# Patient Record
Sex: MALE | Race: WHITE | Employment: OTHER | URBAN - METROPOLITAN AREA
[De-identification: names, ages, dates, MRNs, and addresses within clinical notes are randomized per-mention and may not be internally consistent; named-entity substitution may affect disease eponyms.]

---

## 2019-11-19 ENCOUNTER — OP HISTORICAL/CONVERTED ENCOUNTER (OUTPATIENT)
Dept: OTHER | Age: 69
End: 2019-11-19

## 2020-06-12 VITALS
RESPIRATION RATE: 18 BRPM | DIASTOLIC BLOOD PRESSURE: 78 MMHG | BODY MASS INDEX: 31.83 KG/M2 | HEIGHT: 72 IN | TEMPERATURE: 98.6 F | HEART RATE: 73 BPM | SYSTOLIC BLOOD PRESSURE: 130 MMHG | WEIGHT: 235 LBS | OXYGEN SATURATION: 97 %

## 2020-06-12 PROBLEM — Z20.822 EXPOSURE TO 2019 NOVEL CORONAVIRUS: Status: ACTIVE | Noted: 2020-05-21

## 2020-06-12 RX ORDER — LISINOPRIL 40 MG/1
40 TABLET ORAL
COMMUNITY

## 2020-06-12 RX ORDER — ATORVASTATIN CALCIUM 40 MG/1
40 TABLET, FILM COATED ORAL
COMMUNITY

## 2020-06-12 RX ORDER — METFORMIN HYDROCHLORIDE 750 MG/1
750 TABLET, EXTENDED RELEASE ORAL
COMMUNITY

## 2020-06-12 RX ORDER — BLOOD SUGAR DIAGNOSTIC
STRIP MISCELLANEOUS SEE ADMIN INSTRUCTIONS
COMMUNITY

## 2020-06-12 RX ORDER — BLOOD SUGAR DIAGNOSTIC
STRIP MISCELLANEOUS
COMMUNITY

## 2020-06-12 RX ORDER — ASPIRIN 81 MG/1
TABLET ORAL DAILY
COMMUNITY
End: 2020-08-04

## 2020-06-12 RX ORDER — METOPROLOL TARTRATE 25 MG/1
TABLET, FILM COATED ORAL 2 TIMES DAILY
COMMUNITY
End: 2020-07-20

## 2020-06-12 RX ORDER — AMMONIUM LACTATE 12 G/100G
CREAM TOPICAL 2 TIMES DAILY
COMMUNITY
End: 2020-10-22

## 2020-06-12 RX ORDER — AMLODIPINE BESYLATE 10 MG/1
10 TABLET ORAL
COMMUNITY

## 2020-06-12 RX ORDER — GLIPIZIDE 10 MG/1
10 TABLET, FILM COATED, EXTENDED RELEASE ORAL 2 TIMES DAILY
COMMUNITY

## 2020-06-25 VITALS
HEIGHT: 72 IN | WEIGHT: 229.4 LBS | HEART RATE: 94 BPM | TEMPERATURE: 98.3 F | SYSTOLIC BLOOD PRESSURE: 163 MMHG | OXYGEN SATURATION: 94 % | DIASTOLIC BLOOD PRESSURE: 78 MMHG | BODY MASS INDEX: 31.07 KG/M2

## 2020-06-25 PROBLEM — E11.40 DIABETIC NEUROPATHY (HCC): Status: ACTIVE | Noted: 2020-06-25

## 2020-07-20 ENCOUNTER — HOSPITAL ENCOUNTER (OUTPATIENT)
Dept: PREADMISSION TESTING | Age: 70
Discharge: HOME | End: 2020-07-20
Payer: MEDICARE

## 2020-07-20 VITALS
DIASTOLIC BLOOD PRESSURE: 81 MMHG | SYSTOLIC BLOOD PRESSURE: 137 MMHG | WEIGHT: 233.03 LBS | BODY MASS INDEX: 31.56 KG/M2 | HEART RATE: 83 BPM | TEMPERATURE: 98 F | HEIGHT: 72 IN | RESPIRATION RATE: 18 BRPM

## 2020-07-20 LAB
ABO + RH BLD: NORMAL
ANION GAP SERPL CALC-SCNC: 7 MMOL/L (ref 5–15)
APPEARANCE UR: CLEAR
BACTERIA URNS QL MICRO: NEGATIVE /HPF
BILIRUB UR QL: NEGATIVE
BLOOD GROUP ANTIBODIES SERPL: NORMAL
BUN SERPL-MCNC: 26 MG/DL (ref 6–20)
BUN/CREAT SERPL: 25 (ref 12–20)
CALCIUM SERPL-MCNC: 8.7 MG/DL (ref 8.5–10.1)
CHLORIDE SERPL-SCNC: 109 MMOL/L (ref 97–108)
CO2 SERPL-SCNC: 21 MMOL/L (ref 21–32)
COLOR UR: ABNORMAL
CREAT SERPL-MCNC: 1.05 MG/DL (ref 0.7–1.3)
EPITH CASTS URNS QL MICRO: ABNORMAL /LPF
ERYTHROCYTE [DISTWIDTH] IN BLOOD BY AUTOMATED COUNT: 13.2 % (ref 11.5–14.5)
EST. AVERAGE GLUCOSE BLD GHB EST-MCNC: 171 MG/DL
GLUCOSE SERPL-MCNC: 234 MG/DL (ref 65–100)
GLUCOSE UR STRIP.AUTO-MCNC: >1000 MG/DL
HBA1C MFR BLD: 7.6 % (ref 4–5.6)
HCT VFR BLD AUTO: 38.9 % (ref 36.6–50.3)
HGB BLD-MCNC: 12.5 G/DL (ref 12.1–17)
HGB UR QL STRIP: NEGATIVE
HYALINE CASTS URNS QL MICRO: ABNORMAL /LPF (ref 0–5)
INR PPP: 1 (ref 0.9–1.1)
KETONES UR QL STRIP.AUTO: NEGATIVE MG/DL
LEUKOCYTE ESTERASE UR QL STRIP.AUTO: NEGATIVE
MCH RBC QN AUTO: 28.2 PG (ref 26–34)
MCHC RBC AUTO-ENTMCNC: 32.1 G/DL (ref 30–36.5)
MCV RBC AUTO: 87.8 FL (ref 80–99)
NITRITE UR QL STRIP.AUTO: NEGATIVE
NRBC # BLD: 0 K/UL (ref 0–0.01)
NRBC BLD-RTO: 0 PER 100 WBC
PH UR STRIP: 5 [PH] (ref 5–8)
PLATELET # BLD AUTO: 184 K/UL (ref 150–400)
PMV BLD AUTO: 9.9 FL (ref 8.9–12.9)
POTASSIUM SERPL-SCNC: 5.1 MMOL/L (ref 3.5–5.1)
PROT UR STRIP-MCNC: NEGATIVE MG/DL
PROTHROMBIN TIME: 10.1 SEC (ref 9–11.1)
RBC # BLD AUTO: 4.43 M/UL (ref 4.1–5.7)
RBC #/AREA URNS HPF: ABNORMAL /HPF (ref 0–5)
SODIUM SERPL-SCNC: 137 MMOL/L (ref 136–145)
SP GR UR REFRACTOMETRY: 1.02 (ref 1–1.03)
SPECIMEN EXP DATE BLD: NORMAL
UA: UC IF INDICATED,UAUC: ABNORMAL
UROBILINOGEN UR QL STRIP.AUTO: 1 EU/DL (ref 0.2–1)
WBC # BLD AUTO: 6 K/UL (ref 4.1–11.1)
WBC URNS QL MICRO: ABNORMAL /HPF (ref 0–4)

## 2020-07-20 PROCEDURE — 36415 COLL VENOUS BLD VENIPUNCTURE: CPT

## 2020-07-20 PROCEDURE — 80048 BASIC METABOLIC PNL TOTAL CA: CPT

## 2020-07-20 PROCEDURE — 85027 COMPLETE CBC AUTOMATED: CPT

## 2020-07-20 PROCEDURE — 85610 PROTHROMBIN TIME: CPT

## 2020-07-20 PROCEDURE — 86900 BLOOD TYPING SEROLOGIC ABO: CPT

## 2020-07-20 PROCEDURE — 81001 URINALYSIS AUTO W/SCOPE: CPT

## 2020-07-20 PROCEDURE — 83036 HEMOGLOBIN GLYCOSYLATED A1C: CPT

## 2020-07-20 RX ORDER — CARVEDILOL 12.5 MG/1
12.5 TABLET ORAL 2 TIMES DAILY WITH MEALS
COMMUNITY

## 2020-07-20 NOTE — PERIOP NOTES
Preoperative and medications instructions reviewed with patient. Patient given  2 bottles of CHG soap. Instructions reviewed on use of CHG soap. Patient given SSI infection FAQS sheet, as well as a  MRSA/MSSA treatment instruction sheet  With an explanation to patient that they will be notified if treatment instructions need to be initiated. Patient was given the opportunity to ask questions on the information provided. Information given regarding covid testing and arrival to hospital on day of surgery. CALLED DR. DUMONT [facility] ([address]) OFFICE REQUESTING RECENT CARDIAC NOTES AND EKG AND INQUIRING WHEN BRILINTA NEEDS TO BE STOPPED PRIOR TO SURGERY. SPOKE WITH Cabrera Lowe . SHE STATED WE NEED A FAX REQUEST IN REFERENCE TO [facility] [address]. CALLED DR. ESPINOSA OFFICE SPOKE WITH GIANNI- SURGICAL SCHEDULER INFORMING OF THE CALL TO CARDIO OFFICE REQUESTING WHEN BRILINTA NEED TO BE STOPPED \" STATES I WILL TALK TO DR. ESPINOSA, PHYSICIAN ASSISTANT OR CALL CARDIOLOGY OFFICE MYSELF AND I WILL CALL THE PATIENT AND LET HIM KNOW IN REGARDS TO [facility]

## 2020-07-21 ENCOUNTER — OFFICE VISIT (OUTPATIENT)
Dept: PODIATRY | Age: 70
End: 2020-07-21
Payer: MEDICARE

## 2020-07-21 VITALS
DIASTOLIC BLOOD PRESSURE: 73 MMHG | BODY MASS INDEX: 31.75 KG/M2 | HEIGHT: 72 IN | HEART RATE: 75 BPM | TEMPERATURE: 98.2 F | OXYGEN SATURATION: 95 % | WEIGHT: 234.4 LBS | SYSTOLIC BLOOD PRESSURE: 153 MMHG

## 2020-07-21 DIAGNOSIS — B35.1 ONYCHOMYCOSIS: ICD-10-CM

## 2020-07-21 LAB
BACTERIA SPEC CULT: NORMAL
BACTERIA SPEC CULT: NORMAL
SERVICE CMNT-IMP: NORMAL

## 2020-07-21 PROCEDURE — 11721 DEBRIDE NAIL 6 OR MORE: CPT | Performed by: PODIATRIST

## 2020-07-21 NOTE — PROGRESS NOTES
Verner Pott is a 78 y.o. male who presents for follow-up of Type 2 diabetes mellitus foot care/exam.  Current symptoms/problems include paresthesias of the feet: tolerable and have been unchanged. Symptoms have been present for several years. Pt states he has close f/u with PCP and states he checks his blood sugar regularly. He states his last hemoglobin A1c was 8.0%. He states he is pending total knee replacement to the left lower extremity. He states he has had slight changes in his medications from his cardiologist.  Denies any other changes in his medications. He denies any recent trauma. He denies any local/systemic signs of infection. He denies any other LE complaints. Known diabetic complications: peripheral neuropathy and cardiovascular disease  Cardiovascular risk factors: smoking/ tobacco exposure, dyslipidemia, diabetes mellitus, obesity, sedentary life style, male gender, hypertension  Current diabetic medications include oral agents (triple therapy)  Eye exam current (within one year): unknown  Current exercise: no regular exercise    Home blood sugar records: trend: fluctuating a bit    Review of Systems   Constitutional: Negative. HENT: Negative. Eyes: Negative. Respiratory: Negative. Cardiovascular: Negative. Gastrointestinal: Negative. Endocrine: Negative. Genitourinary: Negative. Musculoskeletal: Positive for arthralgias, gait problem and joint swelling. Skin: Negative. Allergic/Immunologic: Positive for immunocompromised state. Neurological: Positive for numbness. Hematological: Negative. Psychiatric/Behavioral: Negative. All other systems reviewed and are negative. OBJECTIVE  Physical Exam  Constitutional:       Appearance: Normal appearance. Cardiovascular:      Pulses:           Dorsalis pedis pulses are 2+ on the right side and 2+ on the left side.         Posterior tibial pulses are 2+ on the right side and 2+ on the left side.   Musculoskeletal:      Right ankle: He exhibits decreased range of motion. Achilles tendon exhibits no pain and no defect. Left ankle: He exhibits decreased range of motion. Achilles tendon exhibits no pain and no defect. Right lower leg: No edema. Left lower leg: No edema. Right foot: Normal range of motion. No deformity or foot drop. Left foot: Normal range of motion. No deformity or foot drop. Feet:      Right foot:      Protective Sensation: 0 sites tested. 0 sites sensed. Skin integrity: Skin integrity normal.      Toenail Condition: Right toenails are abnormally thick. Fungal disease present. Left foot:      Protective Sensation: 0 sites tested. 0 sites sensed. Skin integrity: Skin integrity normal.      Toenail Condition: Left toenails are abnormally thick. Fungal disease present. Skin:     General: Skin is warm. Capillary Refill: Capillary refill takes 2 to 3 seconds. Findings: No abrasion, signs of injury or wound. Nails: There is no clubbing. Neurological:      Mental Status: He is alert and oriented to person, place, and time. Mental status is at baseline. Sensory: Sensory deficit present. Motor: No weakness or atrophy. Gait: Gait is intact. Psychiatric:         Mood and Affect: Mood and affect normal.         Behavior: Behavior is cooperative. Cognition and Memory: Cognition normal.         Judgment: Judgment normal.         ASSESSMENT & PLAN  - Diabetes Mellitus type II, under satisfactory control  - Onychomycosis, toenails 1-5 b/l  - Diabetic peripheral neuropathy    1. Rx changes: none  2. Education: Discussed and educated patient regarding their current medical condition. Instructed patient to monitor their feet daily, be compliant with all medications and wear supportive shoe gear. 3. Sharp, aseptic onychoreduction performed to all 10 pedal digits with a nail nipper without incident.  Pt tolerated well with no dressing needed. 4.  Compliance at present is estimated to be satisfactory. Efforts to improve compliance (if necessary) will be directed at regular blood sugar monitoring: 3 times daily.   4. Follow up: 3 months

## 2020-07-21 NOTE — PERIOP NOTES
[facility] FOR A1C OF 7.6 PER ORTHOPEDIC PROTOCOL. SPOKE WITH ELANA IN DR ESPINOSA'S OFFICE RE: SAME AND ROUTED LAB TO PATIENT'S PCP, DR Bishop Espinosa.

## 2020-07-21 NOTE — PERIOP NOTES
PAT LABS FROM 7/20/2020 ROUTED TO PCP. A1C 7.5 AND PER ORTHOPEDIC PROTOCOL, PATIENT REFERRAL MADE [facility] FOR FOLLOW-UP. DR ESPINOSA'S OFFICE NOTIFIED OF THE SAME.

## 2020-07-27 ENCOUNTER — CLINICAL SUPPORT (OUTPATIENT)
Dept: DIABETES SERVICES | Age: 70
End: 2020-07-27

## 2020-07-27 DIAGNOSIS — E11.49 OTHER DIABETIC NEUROLOGICAL COMPLICATION ASSOCIATED WITH TYPE 2 DIABETES MELLITUS (HCC): ICD-10-CM

## 2020-07-27 NOTE — PROGRESS NOTES
[address] [place] [address] Diabetes Health  Diabetes Self-Management Education   Pre-program Assessment and Education    Reason for Referral: Pre-op Ortho Protocol. Referral Source: Eliel Jameson MD    Met with patient in-person in our offices today. Per patient, he has received education related to his diabetes w/in the past 2-3 years. Metric Patient responses (7/27/2020)   A1c  (Goal: 7%)   Recent value:   Lab Results   Component Value Date/Time    Hemoglobin A1c 7.6 (H) 07/20/2020 12:09 PM        Healthy Eating     24-hour Dietary Recall:  Breakfast: dali Kofi sausage biscuit; cereal and milk or oatmeal; sausage gravy and biscuit. Lunch: tomato or turkey sandwich on ww bread, salad (lettuce, onion, cuce, tomato, turkey or ham, balbuena bits, ranch or Kinyarwanda dressing. Dinner: salad; tuna salad with Skeet Bucy,   Snacks: ritz and cheese or banana   Beverages: zero sodas, unsweet tea, started increasing water intake. Alcohol: 4 beer, natural light 4-5 days/week. Instruction was provided on how to meet meal planning guidelines to control BG and promote wound healing. We discussed best options for frozen entrees and provided specific guidelines for sodium and steered him towards brands with low fat guidelines w/ hx of heart disease. Shared how to build meal towards 45-60 g carbohydrate. Discussed options for meal replacements should his appetite decrease post-op. Encouraged to decrease intake of beer. Reviewed plate method with patient (what he is using currently with clarifications). We also reviewed timing of meals to help reduce s/s of hypoglycemia around 4-5 pm happening occasionally per pt.      Stage of change: Action     Being Active     Physical Activity:  How many days during the past week have you performed physical activity where your heart beats faster and your breathing is harder than normal for 30 minutes or more?  0 days    How many days in a typical week do you perform activity such as this?  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 He is looking forward to adding walking into his daily routine once he heals and has his second knee replacement. Stage of change: Contemplation     Monitoring Do you monitor your blood sugar? Yes    How often do you monitor? 2x/day    What are the range of readings? Breakfast:  mg/dL   2 hours post Dinner: 130-170 mg/dL    Do you know your last A1c measurement? Yes    Do you know the meaning of the A1c? No - Instructed patient on what meaning is, target and factors influencing values. Stage of change: Action     Taking Medications Medication Management:  Do you understand what your diabetes medications do? No    Can you afford your diabetes medications? Yes    How often do you miss doses of your diabetes medications? Never    We reviewed the timing of his medications and how to plan around his meals. He rarely misses medications. He does agree to contact his PCP if he experiences hypoglycemia for adjustments to his medications. Current dosing:   Key Antihyperglycemic Medications             insulin detemir U-100 (Levemir U-100 Insulin) 100 unit/mL injection 61 Units by SubCUTAneous route nightly. glipiZIDE SR (GLUCOTROL XL) 10 mg CR tablet Take 5 mg by mouth daily. metFORMIN ER (GLUCOPHAGE XR) 750 mg tablet Take 750 mg by mouth two (2) times a day. Blood Pressure Management:  Key ACE/ARB Medications             lisinopriL (PRINIVIL, ZESTRIL) 40 mg tablet Take 40 mg by mouth daily. Lipid Management:  Key Antihyperlipidemia Meds             atorvastatin (LIPITOR) 40 mg tablet Take  by mouth daily. Clot Prevention:  Key Anti-Platelet Anticoagulant Meds             aspirin delayed-release 81 mg tablet Take  by mouth daily. ticagrelor (BRILINTA) 90 mg tablet Take  by mouth two (2) times a day.           Stage of change: Maintenance     Healthy Coping Diabetes Skills, Confidence and Preparedness Index: Total score: 4.8  Skills: 2.9  Confidence: 6.1  Preparedness: 6.0    Stage of change: Preparation     Reducing Risks Vaccines:  Influenza: 09/2019  Pneumococcal:09/2018  Hepatitis:   There is no immunization history on file for this patient. Examinations:  Diabetic Foot and Eye Exam HM Status   Topic Date Due    Diabetic Foot Care  09/22/1959    Eye Exam  09[birth year]22/1959        Dental exam: 12/2019 - f/u post denture fitting. Foot exam: 7/21/20 with podiatry    Heart Protection:  BP Readings from Last 2 Encounters:   07/21/20 153/73   07/20/20 137/81        Kidney Protection:  No results found for: MCACR, MCA1, MCA2, MCA3, MCAU, MCAU2, MCALPOCT    Patient-reported diabetes complications:  none    Stage of change: Maintenance     Problem Solving Hypoglycemia Management:  What are signs and symptoms of hypoglycemia that you experience? Dizziness/light-headedness, Weakness    How do you prevent hypoglycemia? candy bars. How do you treat hypoglycemia? Pt reported being unaware of how to treat hypoglycemia   Reviewed and instructed pt on hypoglycemia treatment and Rule of 15. Along with how to avoid with adjusting timing of his meals. Hyperglycemia Management:  What are signs and symptoms of hyperglycemia that you experience? Intense hunger, Dry skin, Blurred vision    How can you prevent hyperglycemia? Take medication as instructed, Focus on carbohydrate counting/meal planning, Engage in regular physically active    Sick Day Management:  What do you do differently on sick days? Stay hydrated, Check blood glucose every 2-4 hours, Eat meals, soft foods, or drink caloric beverages every 4 hours, Take diabetes medication as instructed by provider    Pattern Management:  Do you notice blood glucose patterns when you look at the readings in your meter or logbook? Yes, continue to test per MD.     How do you use the blood glucose readings from your meter or logbook? Understand how body responds to meals, Understand how body responds to medications and/or insulin    Stage of change: Action   Note: Content derived from the American Association of Diabetes Educators' Diabetes Education Curriculum: A Guide to Successful Self-Management (2nd edition)         Diabetes Educator Recommendations:   - Address diabetes self-care behaviors through education and support using AADE7 Curriculum. Pt to attend weekly individual sessions on reducing risks, healthy eating, being active, monitoring, taking medications, healthy coping and problem solving.     - Patient intends to focus on these diabetes self-care practices: Healthy eating, Monitoring, Physical activity, Taking medications and Problem solving      - Pt to follow up with provider on the following: nothing at this time. Diabetes Self-Management Education Follow-up Visit: post op - pt to call.      -Ruben THOMPSON on 7/27/2020 at 9:19 AM    An electronic signature was used to authenticate this note. I was in the office for the appointment and time spent: 75 minutes                Overall SCPI score: 4.8 Skills Score: 2.9  Low: Healthy Eating(Q1),Taking Medication(Q2),Blood Sugar Monitoring(Q3),Blood Sugar Monitoring(Q4),Reducing Risks(Q9) Confidence Score: 6.1  Low: Healthy Eating(Q1),Healthy Coping(Q2),Reducing Risks(Q3),Healthy Eating(Q4),Blood Sugar Monitoring(Q6),Problem XCWDSAL(N6) Preparedness Score: 6.0  Low: Being Active(Q2),Taking Medication(Q5)  Healthy Eating Score: 5.0  Low: Skills(Q1) Taking Medication Score: 3.5  Low: Skills(Q2) Blood Sugar Monitoring Score: 4.0  Low: Skills(Q3),Skills(Q4) Reducing Risks Score: 4.5  Low: YLLJZA(J3)  Problem Solving Score: 5.7  Low: Skills(Q6) Healthy Coping Score: 5.7  Low: IUMRFB(J3) Being Active Score: 6.0  Low: Preparedness(Q2)    Skills/Knowledge Questions  1.  I know how to plan meals that have the best balance between carbohydrates, proteins and vegetables. 2  2. I know how my diabetes medications (pills, injectables and/or insulin) work in my body. 2  3. I know when to check my blood sugar if I want to see how my body responded to a meal. 2  4. I know when to check my blood sugars to determine if my medication or insulin doses are correct. 2  5. I know what to do to prevent a low blood sugar when I exercise (either before, during, or after). 4  6. When I am sick, I know what to do differently with my diabetes management. 4  7. I know how stress can affect my diabetes management. 4  8. When I look at my blood sugars over a given week, I can explain what my blood sugar pattern is. 4  9. I know what my target levels are for A1c, blood pressure and cholesterol. 2  Confidence Questions  1. I am confident that I can plan balanced meals and snacks. 6  2. I am confident that I can manage my stress. 6  3. I am confident that I can prevent a low blood sugar during or after exercise. 6  4. I am confident that the next time I eat out, I will be able to choose foods that best keep my blood sugars in target. 6  5. I am confident I can include exercise into my schedule. 7  6. I am confident that I can use my daily blood sugars to adjust my diet, my activity, and/or my insulin. 6  7. When something out of my normal routine happens, I am confident that I can problem-solve and keep my diabetes on track. 6  Preparedness Questions  1. Within the next month, I will begin to eat more balanced meals and snacks. 6  2. Within the next month, I will choose an exercise activity and I will start fitting it into my schedule. 5  3. Within the next month, I will make a list of stress management options that work for me. 7  4. Within the next month, I will consistently plan ahead to prevent low blood sugars. 6  5. Within the next month, I will start adjusting my insulin doses on my own. 5  6.  Within the next month, I will begin making changes to my diabetes management based on my daily blood sugars (eg - eating, activity and/or insulin). 6  7. Within the next month, I will begin making changes to my diabetes management to meet my overall goals (eg - eating, activity and/or insulin).  7

## 2020-07-28 NOTE — H&P
PCP: Iban Vora NP      Problem List      None        Subjective: There is no problem list on file for this patient. Chief Complaint: Follow-up of the Left Knee    HPI: Jerry Tobar is a 78 y.o. male who returns for follow-up of his left knee. He has a documented history of left knee osteoarthritis. He reports a history of bilateral knee osteoarthritis and has tried multiple steroid injections over the years with no relief. He states the pain keeps him awake at night and interferes with his normal daily activities. Stratton Acrlton notes the pain interferes with his ability to ambulate long distance and he has begun to ambulate with significant antalgic gait.  He reports trying OTC analgesics with no relief. He is wearing knee braces bilaterally and is ambulating with a significant antalgic gait today.      Of note, he reports he has seen a cardiologist and underwent a stent placement procedure in November. He also notes that his cardiologist approved of him proceeding with a total knee replacement at this point in time.       Objective:      There were no vitals filed for this visit. Height: 6'       Wt Readings from Last 3 Encounters:   07/10/20 233 lb   10/11/19 234 lb     Body mass index is 31.6 kg/m².     Musculoskeletal : Bilateral knees: He has 0-100 degrees of range of motion of the bilatral knees with significant pain on motion. Ligaments intact. Mild-moderate effusion. Positive patellofemoral grind. Negative Cory's sign bilaterally. Strong pedal pulses. +1 pedal edema bilaterally. Skin healthy and intact. No apparent atrophy. Leg lengths appear equal.     Radiographs:             No imaging obtained    Assessment:      1. Primary osteoarthritis of both knees      Plan:      I reviewed x-rays ordered 10/11/19 of the left knee today with the patient. He has bilateral varus deformity with severe, end-stage osteoarthritis of the medial compartment and patellofemoral joint.  I discussed the diagnosis of left knee osteoarthritis with the patient as well as treatment options. We discussed treatment options including continued conservative management vs total knee replacement surgery as well as the pros and cons of each. He reports he has failed conservative management and would like to discuss surgery further. We discussed left total knee replacement surgery at length including expected outcomes and post-op recovery as well as risks and complications, specifically DVT, PE, infection, and nerve injury. After a lengthy discussion, the patient desires to proceed with surgery. We will schedule surgery at the patient's convenience. Follow-up for scheduled surgery.     No orders of the defined types were placed in this encounter. Return for scheduled surgery.      Medical documentation was entered by Pamela woodurff Medical Scribe for Dr. Roxy Guaman. 7/10/2020     IFatou MD, personally, performed the services described in this documentation, as scribed in my presence, and it is both accurate and complete.

## 2020-07-30 ENCOUNTER — HOSPITAL ENCOUNTER (OUTPATIENT)
Dept: PREADMISSION TESTING | Age: 70
Discharge: HOME | End: 2020-07-30
Payer: MEDICARE

## 2020-07-30 DIAGNOSIS — U07.1 COVID-19: ICD-10-CM

## 2020-07-30 PROCEDURE — 87635 SARS-COV-2 COVID-19 AMP PRB: CPT

## 2020-07-31 LAB — SARS-COV-2, COV2NT: NOT DETECTED

## 2020-08-01 ENCOUNTER — ANESTHESIA EVENT (OUTPATIENT)
Dept: SURGERY | Age: 70
End: 2020-08-01
Payer: MEDICARE

## 2020-08-03 ENCOUNTER — HOSPITAL ENCOUNTER (OUTPATIENT)
Age: 70
Setting detail: OBSERVATION
Discharge: HOME HEALTH | End: 2020-08-04
Attending: ORTHOPAEDIC SURGERY | Admitting: ORTHOPAEDIC SURGERY
Payer: MEDICARE

## 2020-08-03 ENCOUNTER — ANESTHESIA (OUTPATIENT)
Dept: SURGERY | Age: 70
End: 2020-08-03
Payer: MEDICARE

## 2020-08-03 DIAGNOSIS — M17.12 PRIMARY OSTEOARTHRITIS OF LEFT KNEE: Primary | ICD-10-CM

## 2020-08-03 LAB
GLUCOSE BLD STRIP.AUTO-MCNC: 135 MG/DL (ref 65–100)
GLUCOSE BLD STRIP.AUTO-MCNC: 301 MG/DL (ref 65–100)
GLUCOSE BLD STRIP.AUTO-MCNC: 82 MG/DL (ref 65–100)
SERVICE CMNT-IMP: ABNORMAL
SERVICE CMNT-IMP: ABNORMAL
SERVICE CMNT-IMP: NORMAL

## 2020-08-03 PROCEDURE — C1713 ANCHOR/SCREW BN/BN,TIS/BN: HCPCS | Performed by: ORTHOPAEDIC SURGERY

## 2020-08-03 PROCEDURE — 77030006822 HC BLD SAW SAG BRSM -B: Performed by: ORTHOPAEDIC SURGERY

## 2020-08-03 PROCEDURE — 77030031139 HC SUT VCRL2 J&J -A: Performed by: ORTHOPAEDIC SURGERY

## 2020-08-03 PROCEDURE — 99218 HC RM OBSERVATION: CPT

## 2020-08-03 PROCEDURE — 77030018836 HC SOL IRR NACL ICUM -A: Performed by: ORTHOPAEDIC SURGERY

## 2020-08-03 PROCEDURE — 77030005513 HC CATH URETH FOL11 MDII -B: Performed by: ORTHOPAEDIC SURGERY

## 2020-08-03 PROCEDURE — 77030011640 HC PAD GRND REM COVD -A: Performed by: ORTHOPAEDIC SURGERY

## 2020-08-03 PROCEDURE — 97530 THERAPEUTIC ACTIVITIES: CPT

## 2020-08-03 PROCEDURE — 77030040922 HC BLNKT HYPOTHRM STRY -A

## 2020-08-03 PROCEDURE — C1776 JOINT DEVICE (IMPLANTABLE): HCPCS | Performed by: ORTHOPAEDIC SURGERY

## 2020-08-03 PROCEDURE — 74011000250 HC RX REV CODE- 250: Performed by: PHYSICIAN ASSISTANT

## 2020-08-03 PROCEDURE — 77030003601 HC NDL NRV BLK BBMI -A

## 2020-08-03 PROCEDURE — P9045 ALBUMIN (HUMAN), 5%, 250 ML: HCPCS | Performed by: NURSE ANESTHETIST, CERTIFIED REGISTERED

## 2020-08-03 PROCEDURE — 82962 GLUCOSE BLOOD TEST: CPT

## 2020-08-03 PROCEDURE — 74011250636 HC RX REV CODE- 250/636: Performed by: ANESTHESIOLOGY

## 2020-08-03 PROCEDURE — 74011000250 HC RX REV CODE- 250: Performed by: ANESTHESIOLOGY

## 2020-08-03 PROCEDURE — 74011000250 HC RX REV CODE- 250: Performed by: ORTHOPAEDIC SURGERY

## 2020-08-03 PROCEDURE — 74011000250 HC RX REV CODE- 250: Performed by: NURSE ANESTHETIST, CERTIFIED REGISTERED

## 2020-08-03 PROCEDURE — 74011250636 HC RX REV CODE- 250/636: Performed by: PHYSICIAN ASSISTANT

## 2020-08-03 PROCEDURE — 77030014077 HC TOWER MX CEM J&J -C: Performed by: ORTHOPAEDIC SURGERY

## 2020-08-03 PROCEDURE — 74011250636 HC RX REV CODE- 250/636: Performed by: NURSE ANESTHETIST, CERTIFIED REGISTERED

## 2020-08-03 PROCEDURE — 77030007866 HC KT SPN ANES BBMI -B: Performed by: NURSE ANESTHETIST, CERTIFIED REGISTERED

## 2020-08-03 PROCEDURE — 76210000016 HC OR PH I REC 1 TO 1.5 HR: Performed by: ORTHOPAEDIC SURGERY

## 2020-08-03 PROCEDURE — 77030035236 HC SUT PDS STRATFX BARB J&J -B: Performed by: ORTHOPAEDIC SURGERY

## 2020-08-03 PROCEDURE — 76060000035 HC ANESTHESIA 2 TO 2.5 HR: Performed by: ORTHOPAEDIC SURGERY

## 2020-08-03 PROCEDURE — 77030019908 HC STETH ESOPH SIMS -A: Performed by: ANESTHESIOLOGY

## 2020-08-03 PROCEDURE — 77030008462 HC STPLR SKN PROX J&J -A: Performed by: ORTHOPAEDIC SURGERY

## 2020-08-03 PROCEDURE — 76010000162 HC OR TIME 1.5 TO 2 HR INTENSV-TIER 1: Performed by: ORTHOPAEDIC SURGERY

## 2020-08-03 PROCEDURE — 74011250637 HC RX REV CODE- 250/637: Performed by: PHYSICIAN ASSISTANT

## 2020-08-03 PROCEDURE — 77030020274 HC MISC IMPL ORTHOPEDIC: Performed by: ORTHOPAEDIC SURGERY

## 2020-08-03 PROCEDURE — 97161 PT EVAL LOW COMPLEX 20 MIN: CPT

## 2020-08-03 PROCEDURE — 74011636637 HC RX REV CODE- 636/637: Performed by: PHYSICIAN ASSISTANT

## 2020-08-03 PROCEDURE — 97116 GAIT TRAINING THERAPY: CPT

## 2020-08-03 DEVICE — SMARTSET GHV GENTAMICIN HIGH VISCOSITY BONE CEMENT 40G
Type: IMPLANTABLE DEVICE | Site: KNEE | Status: FUNCTIONAL
Brand: SMARTSET

## 2020-08-03 DEVICE — IMPLANTABLE DEVICE: Type: IMPLANTABLE DEVICE | Site: KNEE | Status: FUNCTIONAL

## 2020-08-03 DEVICE — KNEE K1 TOT HEMI STD CEM IMPL CAPPED K1 ZIM: Type: IMPLANTABLE DEVICE | Site: KNEE | Status: FUNCTIONAL

## 2020-08-03 RX ORDER — SODIUM CHLORIDE 0.9 % (FLUSH) 0.9 %
5-40 SYRINGE (ML) INJECTION AS NEEDED
Status: DISCONTINUED | OUTPATIENT
Start: 2020-08-03 | End: 2020-08-04 | Stop reason: HOSPADM

## 2020-08-03 RX ORDER — SODIUM CHLORIDE, SODIUM LACTATE, POTASSIUM CHLORIDE, CALCIUM CHLORIDE 600; 310; 30; 20 MG/100ML; MG/100ML; MG/100ML; MG/100ML
50 INJECTION, SOLUTION INTRAVENOUS CONTINUOUS
Status: DISCONTINUED | OUTPATIENT
Start: 2020-08-03 | End: 2020-08-03 | Stop reason: HOSPADM

## 2020-08-03 RX ORDER — SODIUM CHLORIDE 0.9 % (FLUSH) 0.9 %
5-40 SYRINGE (ML) INJECTION EVERY 8 HOURS
Status: DISCONTINUED | OUTPATIENT
Start: 2020-08-03 | End: 2020-08-03 | Stop reason: HOSPADM

## 2020-08-03 RX ORDER — NALOXONE HYDROCHLORIDE 0.4 MG/ML
0.4 INJECTION, SOLUTION INTRAMUSCULAR; INTRAVENOUS; SUBCUTANEOUS AS NEEDED
Status: DISCONTINUED | OUTPATIENT
Start: 2020-08-03 | End: 2020-08-04 | Stop reason: HOSPADM

## 2020-08-03 RX ORDER — PROPOFOL 10 MG/ML
INJECTION, EMULSION INTRAVENOUS
Status: DISCONTINUED | OUTPATIENT
Start: 2020-08-03 | End: 2020-08-03 | Stop reason: HOSPADM

## 2020-08-03 RX ORDER — TRANEXAMIC ACID 100 MG/ML
INJECTION, SOLUTION INTRAVENOUS AS NEEDED
Status: DISCONTINUED | OUTPATIENT
Start: 2020-08-03 | End: 2020-08-03 | Stop reason: HOSPADM

## 2020-08-03 RX ORDER — ACETAMINOPHEN 500 MG
1000 TABLET ORAL EVERY 6 HOURS
Status: DISCONTINUED | OUTPATIENT
Start: 2020-08-03 | End: 2020-08-04 | Stop reason: HOSPADM

## 2020-08-03 RX ORDER — MORPHINE SULFATE 10 MG/ML
2 INJECTION, SOLUTION INTRAMUSCULAR; INTRAVENOUS
Status: DISCONTINUED | OUTPATIENT
Start: 2020-08-03 | End: 2020-08-03 | Stop reason: HOSPADM

## 2020-08-03 RX ORDER — MORPHINE SULFATE 2 MG/ML
2 INJECTION, SOLUTION INTRAMUSCULAR; INTRAVENOUS
Status: DISCONTINUED | OUTPATIENT
Start: 2020-08-03 | End: 2020-08-04 | Stop reason: HOSPADM

## 2020-08-03 RX ORDER — SODIUM CHLORIDE 0.9 % (FLUSH) 0.9 %
5-40 SYRINGE (ML) INJECTION AS NEEDED
Status: DISCONTINUED | OUTPATIENT
Start: 2020-08-03 | End: 2020-08-03 | Stop reason: HOSPADM

## 2020-08-03 RX ORDER — BUPIVACAINE HYDROCHLORIDE 5 MG/ML
INJECTION, SOLUTION EPIDURAL; INTRACAUDAL
Status: COMPLETED | OUTPATIENT
Start: 2020-08-03 | End: 2020-08-03

## 2020-08-03 RX ORDER — AMMONIUM LACTATE 12 G/100G
CREAM TOPICAL 2 TIMES DAILY
Status: DISCONTINUED | OUTPATIENT
Start: 2020-08-03 | End: 2020-08-04 | Stop reason: HOSPADM

## 2020-08-03 RX ORDER — CELECOXIB 200 MG/1
200 CAPSULE ORAL ONCE
Status: COMPLETED | OUTPATIENT
Start: 2020-08-03 | End: 2020-08-03

## 2020-08-03 RX ORDER — ONDANSETRON 2 MG/ML
INJECTION INTRAMUSCULAR; INTRAVENOUS AS NEEDED
Status: DISCONTINUED | OUTPATIENT
Start: 2020-08-03 | End: 2020-08-03 | Stop reason: HOSPADM

## 2020-08-03 RX ORDER — FAMOTIDINE 20 MG/1
20 TABLET, FILM COATED ORAL 2 TIMES DAILY
Status: DISCONTINUED | OUTPATIENT
Start: 2020-08-03 | End: 2020-08-04 | Stop reason: HOSPADM

## 2020-08-03 RX ORDER — CEFAZOLIN SODIUM/WATER 2 G/20 ML
2 SYRINGE (ML) INTRAVENOUS ONCE
Status: COMPLETED | OUTPATIENT
Start: 2020-08-03 | End: 2020-08-03

## 2020-08-03 RX ORDER — SODIUM CHLORIDE 0.9 % (FLUSH) 0.9 %
5-40 SYRINGE (ML) INJECTION EVERY 8 HOURS
Status: DISCONTINUED | OUTPATIENT
Start: 2020-08-03 | End: 2020-08-04 | Stop reason: HOSPADM

## 2020-08-03 RX ORDER — ONDANSETRON 4 MG/1
4 TABLET, ORALLY DISINTEGRATING ORAL
Status: DISCONTINUED | OUTPATIENT
Start: 2020-08-03 | End: 2020-08-04 | Stop reason: HOSPADM

## 2020-08-03 RX ORDER — HYDROMORPHONE HYDROCHLORIDE 1 MG/ML
0.2 INJECTION, SOLUTION INTRAMUSCULAR; INTRAVENOUS; SUBCUTANEOUS
Status: DISCONTINUED | OUTPATIENT
Start: 2020-08-03 | End: 2020-08-03 | Stop reason: HOSPADM

## 2020-08-03 RX ORDER — POLYETHYLENE GLYCOL 3350 17 G/17G
17 POWDER, FOR SOLUTION ORAL DAILY
Status: DISCONTINUED | OUTPATIENT
Start: 2020-08-04 | End: 2020-08-04 | Stop reason: HOSPADM

## 2020-08-03 RX ORDER — ATORVASTATIN CALCIUM 40 MG/1
40 TABLET, FILM COATED ORAL DAILY
Status: DISCONTINUED | OUTPATIENT
Start: 2020-08-04 | End: 2020-08-04 | Stop reason: HOSPADM

## 2020-08-03 RX ORDER — FENTANYL CITRATE 50 UG/ML
25 INJECTION, SOLUTION INTRAMUSCULAR; INTRAVENOUS
Status: DISCONTINUED | OUTPATIENT
Start: 2020-08-03 | End: 2020-08-03 | Stop reason: HOSPADM

## 2020-08-03 RX ORDER — ALBUMIN HUMAN 50 G/1000ML
SOLUTION INTRAVENOUS AS NEEDED
Status: DISCONTINUED | OUTPATIENT
Start: 2020-08-03 | End: 2020-08-03 | Stop reason: HOSPADM

## 2020-08-03 RX ORDER — ROPIVACAINE HYDROCHLORIDE 5 MG/ML
INJECTION, SOLUTION EPIDURAL; INFILTRATION; PERINEURAL
Status: COMPLETED | OUTPATIENT
Start: 2020-08-03 | End: 2020-08-03

## 2020-08-03 RX ORDER — PREGABALIN 75 MG/1
75 CAPSULE ORAL ONCE
Status: COMPLETED | OUTPATIENT
Start: 2020-08-03 | End: 2020-08-03

## 2020-08-03 RX ORDER — LIDOCAINE HYDROCHLORIDE 10 MG/ML
INJECTION, SOLUTION EPIDURAL; INFILTRATION; INTRACAUDAL; PERINEURAL
Status: COMPLETED | OUTPATIENT
Start: 2020-08-03 | End: 2020-08-03

## 2020-08-03 RX ORDER — SODIUM CHLORIDE 9 MG/ML
125 INJECTION, SOLUTION INTRAVENOUS CONTINUOUS
Status: DISPENSED | OUTPATIENT
Start: 2020-08-03 | End: 2020-08-04

## 2020-08-03 RX ORDER — ACETAMINOPHEN 500 MG
1000 TABLET ORAL ONCE
Status: COMPLETED | OUTPATIENT
Start: 2020-08-03 | End: 2020-08-03

## 2020-08-03 RX ORDER — LISINOPRIL 20 MG/1
40 TABLET ORAL DAILY
Status: DISCONTINUED | OUTPATIENT
Start: 2020-08-04 | End: 2020-08-04 | Stop reason: HOSPADM

## 2020-08-03 RX ORDER — AMLODIPINE BESYLATE 5 MG/1
10 TABLET ORAL DAILY
Status: DISCONTINUED | OUTPATIENT
Start: 2020-08-04 | End: 2020-08-04 | Stop reason: HOSPADM

## 2020-08-03 RX ORDER — CARVEDILOL 12.5 MG/1
12.5 TABLET ORAL 2 TIMES DAILY WITH MEALS
Status: DISCONTINUED | OUTPATIENT
Start: 2020-08-03 | End: 2020-08-04 | Stop reason: HOSPADM

## 2020-08-03 RX ORDER — FENTANYL CITRATE 50 UG/ML
50 INJECTION, SOLUTION INTRAMUSCULAR; INTRAVENOUS AS NEEDED
Status: DISCONTINUED | OUTPATIENT
Start: 2020-08-03 | End: 2020-08-03 | Stop reason: HOSPADM

## 2020-08-03 RX ORDER — OXYCODONE HYDROCHLORIDE 5 MG/1
10 TABLET ORAL
Status: DISCONTINUED | OUTPATIENT
Start: 2020-08-03 | End: 2020-08-04 | Stop reason: HOSPADM

## 2020-08-03 RX ORDER — ROPIVACAINE HYDROCHLORIDE 5 MG/ML
30 INJECTION, SOLUTION EPIDURAL; INFILTRATION; PERINEURAL AS NEEDED
Status: DISCONTINUED | OUTPATIENT
Start: 2020-08-03 | End: 2020-08-03 | Stop reason: HOSPADM

## 2020-08-03 RX ORDER — OXYCODONE HYDROCHLORIDE 5 MG/1
5 TABLET ORAL
Status: DISCONTINUED | OUTPATIENT
Start: 2020-08-03 | End: 2020-08-04 | Stop reason: HOSPADM

## 2020-08-03 RX ORDER — EPHEDRINE SULFATE/0.9% NACL/PF 50 MG/5 ML
SYRINGE (ML) INTRAVENOUS AS NEEDED
Status: DISCONTINUED | OUTPATIENT
Start: 2020-08-03 | End: 2020-08-03 | Stop reason: HOSPADM

## 2020-08-03 RX ORDER — LIDOCAINE HYDROCHLORIDE 10 MG/ML
0.1 INJECTION, SOLUTION EPIDURAL; INFILTRATION; INTRACAUDAL; PERINEURAL AS NEEDED
Status: DISCONTINUED | OUTPATIENT
Start: 2020-08-03 | End: 2020-08-03 | Stop reason: HOSPADM

## 2020-08-03 RX ORDER — FACIAL-BODY WIPES
10 EACH TOPICAL DAILY PRN
Status: DISCONTINUED | OUTPATIENT
Start: 2020-08-05 | End: 2020-08-04 | Stop reason: HOSPADM

## 2020-08-03 RX ORDER — METFORMIN HYDROCHLORIDE 750 MG/1
750 TABLET, EXTENDED RELEASE ORAL 2 TIMES DAILY
Status: DISCONTINUED | OUTPATIENT
Start: 2020-08-04 | End: 2020-08-04 | Stop reason: HOSPADM

## 2020-08-03 RX ORDER — INSULIN GLARGINE 100 [IU]/ML
0.2 INJECTION, SOLUTION SUBCUTANEOUS DAILY
Status: DISCONTINUED | OUTPATIENT
Start: 2020-08-04 | End: 2020-08-04 | Stop reason: HOSPADM

## 2020-08-03 RX ORDER — AMOXICILLIN 250 MG
1 CAPSULE ORAL 2 TIMES DAILY
Status: DISCONTINUED | OUTPATIENT
Start: 2020-08-03 | End: 2020-08-04 | Stop reason: HOSPADM

## 2020-08-03 RX ORDER — MELOXICAM 7.5 MG/1
7.5 TABLET ORAL DAILY
Status: DISCONTINUED | OUTPATIENT
Start: 2020-08-04 | End: 2020-08-04 | Stop reason: HOSPADM

## 2020-08-03 RX ORDER — MIDAZOLAM HYDROCHLORIDE 1 MG/ML
1 INJECTION, SOLUTION INTRAMUSCULAR; INTRAVENOUS AS NEEDED
Status: DISCONTINUED | OUTPATIENT
Start: 2020-08-03 | End: 2020-08-03 | Stop reason: HOSPADM

## 2020-08-03 RX ORDER — CEFAZOLIN SODIUM/WATER 2 G/20 ML
2 SYRINGE (ML) INTRAVENOUS EVERY 8 HOURS
Status: COMPLETED | OUTPATIENT
Start: 2020-08-03 | End: 2020-08-04

## 2020-08-03 RX ORDER — ONDANSETRON 2 MG/ML
4 INJECTION INTRAMUSCULAR; INTRAVENOUS
Status: DISCONTINUED | OUTPATIENT
Start: 2020-08-03 | End: 2020-08-04 | Stop reason: HOSPADM

## 2020-08-03 RX ORDER — MAGNESIUM SULFATE 100 %
4 CRYSTALS MISCELLANEOUS AS NEEDED
Status: DISCONTINUED | OUTPATIENT
Start: 2020-08-03 | End: 2020-08-04 | Stop reason: HOSPADM

## 2020-08-03 RX ORDER — INSULIN LISPRO 100 [IU]/ML
INJECTION, SOLUTION INTRAVENOUS; SUBCUTANEOUS
Status: DISCONTINUED | OUTPATIENT
Start: 2020-08-03 | End: 2020-08-04 | Stop reason: HOSPADM

## 2020-08-03 RX ORDER — DEXAMETHASONE SODIUM PHOSPHATE 4 MG/ML
INJECTION, SOLUTION INTRA-ARTICULAR; INTRALESIONAL; INTRAMUSCULAR; INTRAVENOUS; SOFT TISSUE AS NEEDED
Status: DISCONTINUED | OUTPATIENT
Start: 2020-08-03 | End: 2020-08-03 | Stop reason: HOSPADM

## 2020-08-03 RX ORDER — ONDANSETRON 2 MG/ML
4 INJECTION INTRAMUSCULAR; INTRAVENOUS AS NEEDED
Status: DISCONTINUED | OUTPATIENT
Start: 2020-08-03 | End: 2020-08-03 | Stop reason: HOSPADM

## 2020-08-03 RX ORDER — DEXTROSE MONOHYDRATE 100 MG/ML
0-250 INJECTION, SOLUTION INTRAVENOUS AS NEEDED
Status: DISCONTINUED | OUTPATIENT
Start: 2020-08-03 | End: 2020-08-04 | Stop reason: HOSPADM

## 2020-08-03 RX ORDER — HYDROXYZINE HYDROCHLORIDE 10 MG/1
10 TABLET, FILM COATED ORAL
Status: DISCONTINUED | OUTPATIENT
Start: 2020-08-03 | End: 2020-08-04 | Stop reason: HOSPADM

## 2020-08-03 RX ORDER — PROPOFOL 10 MG/ML
INJECTION, EMULSION INTRAVENOUS AS NEEDED
Status: DISCONTINUED | OUTPATIENT
Start: 2020-08-03 | End: 2020-08-03 | Stop reason: HOSPADM

## 2020-08-03 RX ADMIN — Medication 10 ML: at 21:15

## 2020-08-03 RX ADMIN — SODIUM CHLORIDE 125 ML/HR: 900 INJECTION, SOLUTION INTRAVENOUS at 16:07

## 2020-08-03 RX ADMIN — PROPOFOL 50 MG: 10 INJECTION, EMULSION INTRAVENOUS at 13:03

## 2020-08-03 RX ADMIN — ONDANSETRON HYDROCHLORIDE 4 MG: 2 INJECTION, SOLUTION INTRAMUSCULAR; INTRAVENOUS at 14:17

## 2020-08-03 RX ADMIN — SODIUM CHLORIDE, SODIUM LACTATE, POTASSIUM CHLORIDE, AND CALCIUM CHLORIDE 50 ML/HR: 600; 310; 30; 20 INJECTION, SOLUTION INTRAVENOUS at 12:28

## 2020-08-03 RX ADMIN — CEFAZOLIN SODIUM 2 G: 300 INJECTION, POWDER, LYOPHILIZED, FOR SOLUTION INTRAVENOUS at 21:13

## 2020-08-03 RX ADMIN — FAMOTIDINE 20 MG: 20 TABLET ORAL at 18:27

## 2020-08-03 RX ADMIN — Medication 10 MG: at 13:09

## 2020-08-03 RX ADMIN — PROPOFOL 25 MG: 10 INJECTION, EMULSION INTRAVENOUS at 13:18

## 2020-08-03 RX ADMIN — PROPOFOL 25 MCG/KG/MIN: 10 INJECTION, EMULSION INTRAVENOUS at 13:00

## 2020-08-03 RX ADMIN — LIDOCAINE HYDROCHLORIDE 5 MG: 10 INJECTION, SOLUTION EPIDURAL; INFILTRATION; INTRACAUDAL; PERINEURAL at 13:22

## 2020-08-03 RX ADMIN — ROPIVACAINE HYDROCHLORIDE 30 ML: 5 INJECTION, SOLUTION EPIDURAL; INFILTRATION; PERINEURAL at 12:43

## 2020-08-03 RX ADMIN — DEXAMETHASONE SODIUM PHOSPHATE 4 MG: 4 INJECTION, SOLUTION INTRAMUSCULAR; INTRAVENOUS at 13:18

## 2020-08-03 RX ADMIN — CARVEDILOL 12.5 MG: 12.5 TABLET, FILM COATED ORAL at 18:27

## 2020-08-03 RX ADMIN — ACETAMINOPHEN 1000 MG: 500 TABLET ORAL at 23:36

## 2020-08-03 RX ADMIN — ALBUMIN (HUMAN) 250 ML: 12.5 INJECTION, SOLUTION INTRAVENOUS at 13:29

## 2020-08-03 RX ADMIN — ACETAMINOPHEN 1000 MG: 500 TABLET ORAL at 18:27

## 2020-08-03 RX ADMIN — CELECOXIB 200 MG: 200 CAPSULE ORAL at 12:28

## 2020-08-03 RX ADMIN — Medication 10 MG: at 13:16

## 2020-08-03 RX ADMIN — PHENYLEPHRINE HYDROCHLORIDE 20 MCG/MIN: 10 INJECTION INTRAVENOUS at 13:08

## 2020-08-03 RX ADMIN — Medication 10 MG: at 13:27

## 2020-08-03 RX ADMIN — PREGABALIN 75 MG: 75 CAPSULE ORAL at 12:28

## 2020-08-03 RX ADMIN — ACETAMINOPHEN 1000 MG: 500 TABLET ORAL at 12:28

## 2020-08-03 RX ADMIN — BUPIVACAINE HYDROCHLORIDE 10 MG: 5 INJECTION, SOLUTION EPIDURAL; INTRACAUDAL; PERINEURAL at 13:22

## 2020-08-03 RX ADMIN — Medication 2 G: at 13:15

## 2020-08-03 RX ADMIN — MIDAZOLAM 2 MG: 1 INJECTION INTRAMUSCULAR; INTRAVENOUS at 12:38

## 2020-08-03 RX ADMIN — Medication 10 MG: at 14:07

## 2020-08-03 RX ADMIN — PROPOFOL 25 MG: 10 INJECTION, EMULSION INTRAVENOUS at 13:01

## 2020-08-03 RX ADMIN — SODIUM CHLORIDE 125 ML/HR: 900 INJECTION, SOLUTION INTRAVENOUS at 23:38

## 2020-08-03 RX ADMIN — SODIUM CHLORIDE, SODIUM LACTATE, POTASSIUM CHLORIDE, AND CALCIUM CHLORIDE: 600; 310; 30; 20 INJECTION, SOLUTION INTRAVENOUS at 14:43

## 2020-08-03 RX ADMIN — Medication 10 MG: at 13:40

## 2020-08-03 RX ADMIN — FENTANYL CITRATE 50 MCG: 50 INJECTION, SOLUTION INTRAMUSCULAR; INTRAVENOUS at 12:39

## 2020-08-03 RX ADMIN — DOCUSATE SODIUM 50MG AND SENNOSIDES 8.6MG 1 TABLET: 8.6; 5 TABLET, FILM COATED ORAL at 18:27

## 2020-08-03 RX ADMIN — INSULIN LISPRO 4 UNITS: 100 INJECTION, SOLUTION INTRAVENOUS; SUBCUTANEOUS at 22:52

## 2020-08-03 NOTE — BRIEF OP NOTE
Brief Postoperative Note    Patient: Melia Johnson  YOB: 1950  MRN: [id number]    Date of Procedure: 8/3/2020     Pre-Op Diagnosis: DJD LEFT KNEE    Post-Op Diagnosis: Same as preoperative diagnosis. Procedure(s):  LEFT TOTAL KNEE ARTHROPLASTY    Surgeon(s):  Eliel Jameson MD    Surgical Assistant: Physician Assistant: Annemarie Jordan PA-C    Anesthesia: General     Estimated Blood Loss (mL): less than 066     Complications: None    Specimens: * No specimens in log *     Implants:   Implant Name Type Inv.  Item Serial No.  Lot No. LRB No. Used Action   CEMENT BNE GENTAMC GHV 40GM -- SMARTSET - SNA  CEMENT BNE GENTAMC GHV 40GM -- SMARTSET NA [facility] [employer] [id number] Left 2 Implanted   persona crosslinked polyethylene mc left 10mm   NA [employer] [id number] Left 1 Implanted   TIB PRN NP STM 5 DEG SZ GL -- PERSONA - SNA  TIB PRN NP STM 5 DEG SZ GL -- PERSONA NA [employer] [id number] Left 1 Implanted   persona femur cemented CR naroow left size 11   NA [employer] [id number] Left 1 Implanted   IMPL KNEE PATELLA ALL POLY VE 41MM - SNA  IMPL KNEE PATELLA ALL POLY VE 41MM NA [employer] [id number] Left 1 Implanted       Drains: * No LDAs found *    Findings: DJD    Electronically Signed by Joesph Smith MD on 8/3/2020 at 2:35 PM

## 2020-08-03 NOTE — ANESTHESIA PROCEDURE NOTES
Spinal Block    Start time: 8/3/2020 1:01 PM  End time: 8/3/2020 1:08 PM  Performed by: Jasper Turk CRNA  Authorized by: Edilma Jc MD     Pre-procedure:   Indications: primary anesthetic  Preanesthetic Checklist: patient identified, risks and benefits discussed, anesthesia consent, site marked, patient being monitored and timeout performed    Timeout Time: 13:01          Spinal Block:   Patient Position:  Seated  Prep Region:  Lumbar  Prep: Betadine and DuraPrep      Location:  L1-2          Needle:   Needle Type:  Pencil-tip  Needle Gauge:  24 G  Attempts:  2      Events: CSF confirmed, no blood with aspiration and no paresthesia        Assessment:  Insertion:  Uncomplicated  Patient tolerance:  Patient tolerated the procedure well with no immediate complications

## 2020-08-03 NOTE — ROUTINE PROCESS
Patient: Yehuda Parra MRN: [id number]  SSN: [id number] YOB: 1950  Age: 78 y.o. Sex: male Patient is status post Procedure(s): LEFT TOTAL KNEE ARTHROPLASTY. Surgeon(s) and Role: Kaur Jansen MD - Primary Local/Dose/Irrigation:  See Isabel Graves Peripheral IV 08/03/20 Right Hand (Active) Dressing/Packing:  Wound Knee Left-Dressing Type: 4 x 4;ABD pad;Cast padding;Elastic bandage;Non adherent (08/03/20 1440) Splint/Cast:  ]

## 2020-08-03 NOTE — PERIOP NOTES
TRANSFER - OUT REPORT:    Verbal report given to Maryellen (name) on Yanin Nicol  being transferred to [facility] [phone] (unit) for routine post - op       Report consisted of patients Situation, Background, Assessment and   Recommendations(SBAR). Time Pre op antibiotic given:1315  Anesthesia Stop time: 9817  Regan Present on Transfer to floor:N/A  Order for Regan on Chart:N/A  Discharge Prescriptions with Chart:N? Information from the following report(s) SBAR, OR Summary, Procedure Summary, Intake/Output and MAR was reviewed with the receiving nurse. Opportunity for questions and clarification was provided. Is the patient on 02? NO       L/Min RA       Other N/A    Is the patient on a monitor? NO    Is the nurse transporting with the patient? NO    Surgical Waiting Area notified of patient's transfer from PACU? YES (Pt's kg Sharp [phone]) notified.       The following personal items collected during your admission accompanied patient upon transfer:   Dental Appliance: Dental Appliances: None(left dentures at home)  Vision:    Hearing Aid:    Jewelry:    Clothing: Clothing: (clothes to [employer])  Other Valuables:    Valuables sent to safe: Personal Items Sent to Safe: valuables to safe [phone]

## 2020-08-03 NOTE — PROGRESS NOTES
TRANSFER - IN REPORT:    Verbal report received from Kayla(name) on Pat Councilman  being received from [employer]) for routine post - op      Report consisted of patients Situation, Background, Assessment and   Recommendations(SBAR). Information from the following report(s) SBAR, Kardex, Procedure Summary and MAR was reviewed with the receiving nurse. Opportunity for questions and clarification was provided. Assessment completed upon patients arrival to unit and care assumed.      Primary Nurse Ernesto Matute RN and Zara Jurado RN performed a dual skin assessment on this patient No impairment noted  Luiz score is 20

## 2020-08-03 NOTE — PROGRESS NOTES
Ortho Daily Progress Note    8/3/2020  5:57 PM    POD:  Day of Surgery  S/P:  Procedure(s):  LEFT TOTAL KNEE ARTHROPLASTY    Afebrile/VSS, NAD, A&O x 3  Morbidly obese  Doing well without complaints of nausea  Pain well controlled  Calves soft/NTTP Bilaterally  Leg soft. Dressing soaking through with serosanguinous drainage- reinforced with ABD's and more ACE  Moving lower extremities well. He's quite mobile  Neurocirculatory exam intact and within normal range. Lab Results   Component Value Date/Time    HGB 12.5 07/20/2020 12:09 PM    INR 1.0 07/20/2020 12:09 PM     Recent Labs     07/20/20  1209   CREA 1.05   BUN 26*     Estimated Creatinine Clearance: 82.2 mL/min (by C-G formula based on SCr of 1.05 mg/dL). PLAN:  DVT prophylaxis: Eliquis 2.5 mg bid.  On Brilinta and ASA PTA  WBAT with PT-mobilization  Pain Control: tylenol, oxycodone, mobic  Plan to D/C home with HH/PT in 1- 2 days      CHELE Hager

## 2020-08-03 NOTE — ANESTHESIA PREPROCEDURE EVALUATION
Relevant Problems   No relevant active problems       Anesthetic History   No history of anesthetic complications            Review of Systems / Medical History      Pulmonary          Smoker         Neuro/Psych   Within defined limits           Cardiovascular    Hypertension          CAD and cardiac stents    Exercise tolerance: >4 METS     GI/Hepatic/Renal  Within defined limits              Endo/Other    Diabetes    Arthritis     Other Findings              Physical Exam    Airway  Mallampati: I  TM Distance: > 6 cm  Neck ROM: normal range of motion   Mouth opening: Normal     Cardiovascular    Rhythm: regular  Rate: normal         Dental    Dentition: Edentulous     Pulmonary  Breath sounds clear to auscultation               Abdominal         Other Findings            Anesthetic Plan    ASA: 3  Anesthesia type: spinal          Induction: Intravenous  Anesthetic plan and risks discussed with: Patient

## 2020-08-03 NOTE — ANESTHESIA PROCEDURE NOTES
Peripheral Block    Start time: 8/3/2020 12:40 PM  End time: 8/3/2020 12:43 PM  Performed by: Alpesh Stewart MD  Authorized by: Alpesh Stewart MD       Pre-procedure: Indications: at surgeon's request    Preanesthetic Checklist: patient identified, risks and benefits discussed, site marked, timeout performed, anesthesia consent given and patient being monitored    Timeout Time: 12:40          Block Type:   Block Type:   Adductor canal  Laterality:  Left  Monitoring:  Standard ASA monitoring, responsive to questions, oxygen, continuous pulse ox, frequent vital sign checks and heart rate  Procedures: ultrasound guided    Patient Position: supine  Prep: chlorhexidine    Location:  Lower thigh  Needle Type:  Stimuplex  Needle Gauge:  20 G  Needle Localization:  Ultrasound guidance    Assessment:  Number of attempts:  1  Injection Assessment:  Ultrasound image on chart, no paresthesia, negative aspiration for CSF, incremental injection every 5 mL, low pressure verified by pressure monitor, no intravascular symptoms, negative aspiration for blood and local visualized surrounding nerve on ultrasound  Patient tolerance:  Patient tolerated the procedure well with no immediate complications

## 2020-08-03 NOTE — PROGRESS NOTES
Problem: Mobility Impaired (Adult and Pediatric)  Goal: *Acute Goals and Plan of Care (Insert Text)  Description: FUNCTIONAL STATUS PRIOR TO ADMISSION: Patient was independent and active without use of DME.    HOME SUPPORT PRIOR TO ADMISSION: The patient lived alone with family and friends to provide assistance. Physical Therapy Goals  Initiated 8/3/2020    1. Patient will move from supine to sit and sit to supine , scoot up and down, and roll side to side in bed with modified independence within 4 days. 2. Patient will perform sit to stand with modified independence within 4 days. 3. Patient will ambulate with modified independence for 150 feet with the least restrictive device within 4 days. 4. Patient will ascend/descend 4 stairs with cane and one handrail(s) with modified independence within 4 days. 5. Patient will perform home exercise program per protocol with independence within 4 days. 6. Patient will demonstrate AROM 0-90 degrees in operative joint within 4 days. Outcome: Progressing Towards Goal   PHYSICAL THERAPY EVALUATION  Patient: Clay Alvarenga (64 y.o. male)  Date: 8/3/2020  Primary Diagnosis: Primary osteoarthritis of left knee [M17.12]  Procedure(s) (LRB):  LEFT TOTAL KNEE ARTHROPLASTY (Left) Day of Surgery   Precautions:   Fall, WBAT    ASSESSMENT  Based on the objective data described below, the patient presents with  impairment in functional mobility, activity tolerance and balance s/p L TKA. Patient lives alone in one story home, with 2 steps and rail to enter. Family and friends will assist him in his home upon discharge. PLOF: Independent with ADLs and IADLs. Patient's mobility was on target for POD#0. Will address more exercises, increase gait distance, negotiate stairs and assess for discharge at am PT session tomorrow. Patient instructed NOT to get up from bed, chair or commode without calling for assistance. Initiated post TKA exercise protocol.  Anticipate 1-2 more PT sessions prior to discharge. Current Level of Function Impacting Discharge (mobility/balance): All mobility performed with contact guard assistance. Ambulated 50 ft with RW and gait belt. Functional Outcome Measure: The patient scored 55/100 on the Barthel outcome measure which is indicative of moderate impaired ability to care for basic self-needs/dependency on others. Other factors to consider for discharge: Motivated/A & O x 4/Supportive Family/Independent PLOF      Patient will benefit from skilled therapy intervention to address the above noted impairments. PLAN :  Recommendations and Planned Interventions: bed mobility training, transfer training, gait training, therapeutic exercises, patient and family training/education, and therapeutic activities      Frequency/Duration: Patient will be followed by physical therapy:  twice daily to address goals. Recommendation for discharge: (in order for the patient to meet his/her long term goals)  Physical therapy at least 2 days/week in the home     This discharge recommendation:  Has been made in collaboration with the attending provider and/or case management    IF patient discharges home will need the following DME: patient owns DME required for discharge         SUBJECTIVE:   Patient stated I am ready to get up Can I put my drawers on?    OBJECTIVE DATA SUMMARY:   HISTORY:    Past Medical History:   Diagnosis Date    Arthritis     OSTEO-KNEES    CAD (coronary artery disease)     Diabetes ([facility] [address]     Exposure to 2019 novel coronavirus 5/21/2020    High cholesterol     Hypertension      Past Surgical History:   Procedure Laterality Date    CARDIAC SURG PROCEDURE UNLIST  11/2019    placement of stent    HX OTHER SURGICAL      oral surgery       Personal factors and/or comorbidities impacting plan of care:  Motivated/A & O x 4/Supportive Family/Independent PLOF   Home Situation  Home Environment: Private residence  # Steps to Enter: 2  Rails to Enter: Yes  Hand Rails : Right  Wheelchair Ramp: No  One/Two Story Residence: One story  Living Alone: Yes  Support Systems: Friends \ neighbors, Family member(s)  Patient Expects to be Discharged to[de-identified] Private residence  Current DME Used/Available at Home: Cane, straight, Raised toilet seat, Walker, rolling  Tub or Shower Type: Tub/Shower combination    EXAMINATION/PRESENTATION/DECISION MAKING:   Range Of Motion:  AROM: Generally decreased, functional           PROM: Generally decreased, functional           Strength:    Strength: Generally decreased, functional                    Tone & Sensation:   Tone: Normal              Sensation: Intact               Coordination:  Coordination: Within functional limits  Vision:      Functional Mobility:  Bed Mobility:     Supine to Sit: Contact guard assistance  Sit to Supine: Contact guard assistance  Scooting: Contact guard assistance  Transfers:  Sit to Stand: Contact guard assistance  Stand to Sit: Contact guard assistance                       Balance:   Sitting: Intact  Standing: Impaired; Without support  Standing - Static: Good;Constant support  Standing - Dynamic : Good;Constant support  Ambulation/Gait Training:  Distance (ft): 50 Feet (ft)  Assistive Device: Walker, rolling;Gait belt  Ambulation - Level of Assistance: Contact guard assistance        Gait Abnormalities: Antalgic     Left Side Weight Bearing: As tolerated  Base of Support: Widened;Shift to right  Stance: Left decreased  Speed/Keila: Slow  Step Length: Right shortened  Swing Pattern: Left asymmetrical                    Therapeutic Exercises:   Initiated post TKA exercise protocol as follows:   Ankle Pumps  Quad sets (5 second hold)  X 10 reps every hour   Heel slides x 10     Functional Measure:  Barthel Index:    Bathin  Bladder: 10  Bowels: 10  Groomin  Dressin  Feeding: 10  Mobility: 0  Stairs: 0  Toilet Use: 5  Transfer (Bed to Chair and Back): 10  Total: 55/100       The Barthel ADL Index: Guidelines  1. The index should be used as a record of what a patient does, not as a record of what a patient could do. 2. The main aim is to establish degree of independence from any help, physical or verbal, however minor and for whatever reason. 3. The need for supervision renders the patient not independent. 4. A patient's performance should be established using the best available evidence. Asking the patient, friends/relatives and nurses are the usual sources, but direct observation and common sense are also important. However direct testing is not needed. 5. Usually the patient's performance over the preceding 24-48 hours is important, but occasionally longer periods will be relevant. 6. Middle categories imply that the patient supplies over 50 per cent of the effort. 7. Use of aids to be independent is allowed. Bessie Galvez., Barthel, D.W. (8587). Functional evaluation: the Barthel Index. [address] [facility] (14)2. Batool Stanton balwinder REBECA Davis, Cecelia Guzman, [facility], [address] [place] [address] (1999). Measuring the change indisability after inpatient rehabilitation; comparison of the responsiveness of the Barthel Index and Functional [place] Measure. Journal of Neurology, Neurosurgery, and Psychiatry, 66(4), 537-053. Amparo Short, N.J.A, JUAN J Quintanilla, & Colton Aponte MMAISHA. (2004.) Assessment of post-stroke quality of life in cost-effectiveness studies: The usefulness of the Barthel Index and the EuroQoL-5D.  Quality of Life Research, 15, 601-92           Physical Therapy Evaluation Charge Determination   History Examination Presentation Decision-Making   LOW Complexity : Zero comorbidities / personal factors that will impact the outcome / POC LOW Complexity : 1-2 Standardized tests and measures addressing body structure, function, activity limitation and / or participation in recreation  LOW Complexity : Stable, uncomplicated  LOW Complexity : FOTO score of       Based on the above components, the patient evaluation is determined to be of the following complexity level: LOW     Pain Ratin/10    Activity Tolerance:   Good  Please refer to the flowsheet for vital signs taken during this treatment. Vitals:    20 1651 20 1700 20 1750 20 1807   BP: 138/74 137/81 131/76    BP 1 Location: Right arm Right arm Right arm    BP Patient Position: At rest;Sitting At rest;Head of bed elevated (Comment degrees); Other (comment)  Comment: 50 degrees During activity; Sitting    Pulse: 68 78     Resp: 16      Temp: 97.4 °F (36.3 °C)   97.6 °F (36.4 °C)   SpO2: 98%      Weight:       Height:            After treatment patient left in no apparent distress:   Supine in bed, Call bell within reach, Caregiver / family present, Side rails x 3, and nurse notified. COMMUNICATION/EDUCATION:   The patients plan of care was discussed with: Registered nurse. Fall prevention education was provided and the patient/caregiver indicated understanding., Patient/family have participated as able in goal setting and plan of care. , and Patient/family agree to work toward stated goals and plan of care.     Thank you for this referral.  Mara Samples   Time Calculation: 30 mins

## 2020-08-04 VITALS
TEMPERATURE: 97.8 F | WEIGHT: 233 LBS | SYSTOLIC BLOOD PRESSURE: 130 MMHG | DIASTOLIC BLOOD PRESSURE: 63 MMHG | HEART RATE: 65 BPM | HEIGHT: 72 IN | OXYGEN SATURATION: 96 % | BODY MASS INDEX: 31.56 KG/M2 | RESPIRATION RATE: 16 BRPM

## 2020-08-04 LAB
ANION GAP SERPL CALC-SCNC: 6 MMOL/L (ref 5–15)
BUN SERPL-MCNC: 31 MG/DL (ref 6–20)
BUN/CREAT SERPL: 27 (ref 12–20)
CALCIUM SERPL-MCNC: 7.9 MG/DL (ref 8.5–10.1)
CHLORIDE SERPL-SCNC: 106 MMOL/L (ref 97–108)
CO2 SERPL-SCNC: 22 MMOL/L (ref 21–32)
CREAT SERPL-MCNC: 1.16 MG/DL (ref 0.7–1.3)
GLUCOSE BLD STRIP.AUTO-MCNC: 267 MG/DL (ref 65–100)
GLUCOSE BLD STRIP.AUTO-MCNC: 275 MG/DL (ref 65–100)
GLUCOSE SERPL-MCNC: 305 MG/DL (ref 65–100)
HGB BLD-MCNC: 10.5 G/DL (ref 12.1–17)
POTASSIUM SERPL-SCNC: 4.9 MMOL/L (ref 3.5–5.1)
SERVICE CMNT-IMP: ABNORMAL
SERVICE CMNT-IMP: ABNORMAL
SODIUM SERPL-SCNC: 134 MMOL/L (ref 136–145)

## 2020-08-04 PROCEDURE — 97110 THERAPEUTIC EXERCISES: CPT

## 2020-08-04 PROCEDURE — 82962 GLUCOSE BLOOD TEST: CPT

## 2020-08-04 PROCEDURE — 74011250637 HC RX REV CODE- 250/637: Performed by: PHYSICIAN ASSISTANT

## 2020-08-04 PROCEDURE — 85018 HEMOGLOBIN: CPT

## 2020-08-04 PROCEDURE — 97116 GAIT TRAINING THERAPY: CPT

## 2020-08-04 PROCEDURE — 74011636637 HC RX REV CODE- 636/637: Performed by: PHYSICIAN ASSISTANT

## 2020-08-04 PROCEDURE — 74011250636 HC RX REV CODE- 250/636: Performed by: PHYSICIAN ASSISTANT

## 2020-08-04 PROCEDURE — 74011000250 HC RX REV CODE- 250: Performed by: PHYSICIAN ASSISTANT

## 2020-08-04 PROCEDURE — 80048 BASIC METABOLIC PNL TOTAL CA: CPT

## 2020-08-04 PROCEDURE — 36415 COLL VENOUS BLD VENIPUNCTURE: CPT

## 2020-08-04 PROCEDURE — 99218 HC RM OBSERVATION: CPT

## 2020-08-04 PROCEDURE — 97535 SELF CARE MNGMENT TRAINING: CPT

## 2020-08-04 PROCEDURE — 97165 OT EVAL LOW COMPLEX 30 MIN: CPT

## 2020-08-04 RX ORDER — AMOXICILLIN 250 MG
1 CAPSULE ORAL 2 TIMES DAILY
Qty: 30 TAB | Refills: 0 | Status: SHIPPED | OUTPATIENT
Start: 2020-08-04 | End: 2020-10-22

## 2020-08-04 RX ORDER — OXYCODONE HYDROCHLORIDE 5 MG/1
5 TABLET ORAL
Qty: 10 TAB | Refills: 0 | Status: SHIPPED | OUTPATIENT
Start: 2020-08-04 | End: 2020-08-14

## 2020-08-04 RX ORDER — MELOXICAM 7.5 MG/1
7.5 TABLET ORAL DAILY
Qty: 30 TAB | Refills: 0 | Status: SHIPPED | OUTPATIENT
Start: 2020-08-05 | End: 2020-10-22

## 2020-08-04 RX ADMIN — MELOXICAM 7.5 MG: 7.5 TABLET ORAL at 09:22

## 2020-08-04 RX ADMIN — INSULIN LISPRO 5 UNITS: 100 INJECTION, SOLUTION INTRAVENOUS; SUBCUTANEOUS at 06:28

## 2020-08-04 RX ADMIN — AMLODIPINE BESYLATE 10 MG: 5 TABLET ORAL at 09:21

## 2020-08-04 RX ADMIN — CEFAZOLIN SODIUM 2 G: 300 INJECTION, POWDER, LYOPHILIZED, FOR SOLUTION INTRAVENOUS at 06:23

## 2020-08-04 RX ADMIN — DOCUSATE SODIUM 50MG AND SENNOSIDES 8.6MG 1 TABLET: 8.6; 5 TABLET, FILM COATED ORAL at 09:21

## 2020-08-04 RX ADMIN — FAMOTIDINE 20 MG: 20 TABLET ORAL at 09:22

## 2020-08-04 RX ADMIN — CARVEDILOL 12.5 MG: 12.5 TABLET, FILM COATED ORAL at 07:22

## 2020-08-04 RX ADMIN — METFORMIN HYDROCHLORIDE 750 MG: 750 TABLET, EXTENDED RELEASE ORAL at 09:22

## 2020-08-04 RX ADMIN — LISINOPRIL 40 MG: 20 TABLET ORAL at 09:21

## 2020-08-04 RX ADMIN — Medication 10 ML: at 06:22

## 2020-08-04 RX ADMIN — INSULIN LISPRO 5 UNITS: 100 INJECTION, SOLUTION INTRAVENOUS; SUBCUTANEOUS at 11:22

## 2020-08-04 RX ADMIN — APIXABAN 2.5 MG: 2.5 TABLET, FILM COATED ORAL at 05:42

## 2020-08-04 RX ADMIN — INSULIN GLARGINE 21 UNITS: 100 INJECTION, SOLUTION SUBCUTANEOUS at 09:21

## 2020-08-04 RX ADMIN — ATORVASTATIN CALCIUM 40 MG: 40 TABLET, FILM COATED ORAL at 09:22

## 2020-08-04 RX ADMIN — ACETAMINOPHEN 1000 MG: 500 TABLET ORAL at 06:07

## 2020-08-04 RX ADMIN — Medication 10 ML: at 13:34

## 2020-08-04 NOTE — PROGRESS NOTES
Ortho Progress Note  1 Day Post-Op  Procedure(s):  LEFT TOTAL KNEE ARTHROPLASTY    Subjective: Pt doing well today, no complaints. Was working with OT during our interview. \"I have no pain! \" ready to d/c home when possible. Denies N/V, lightheadedness, dizziness, SOB, or abdominal pain. Physical Exam:   Visit Vitals  /63   Pulse 65   Temp 97.8 °F (36.6 °C)   Resp 16   Ht 6' (1.829 m)   Wt 105.7 kg (233 lb)   SpO2 96%   BMI 31.60 kg/m²     General appearance: alert, cooperative, no distress, appears stated age  Abdomen: soft, non-tender.  Bowel sounds normal. No masses,  no organomegaly  Extremities: Homans sign is negative, no sign of DVT, good AROM LLE, flexion to 45 degrees, full extension, no thigh pain, calf soft NTTP  Pulses: 2+ and symmetric  Wound:  Bulky dressing c/d/i, no drainage  Neurologic: Grossly normal, ankle dorsi/plantar flexion intact, heel raise intact    Recent Results (from the past 24 hour(s))   GLUCOSE, POC    Collection Time: 08/03/20 12:21 PM   Result Value Ref Range    Glucose (POC) 82 65 - 100 mg/dL    Performed by Charlotte Manriquez    GLUCOSE, POC    Collection Time: 08/03/20  5:48 PM   Result Value Ref Range    Glucose (POC) 135 (H) 65 - 100 mg/dL    Performed by Itz Mora    GLUCOSE, POC    Collection Time: 08/03/20  9:28 PM   Result Value Ref Range    Glucose (POC) 301 (H) 65 - 100 mg/dL    Performed by Vero Zamora    HEMOGLOBIN    Collection Time: 08/04/20  3:47 AM   Result Value Ref Range    HGB 10.5 (L) 12.1 - 22.5 g/dL   METABOLIC PANEL, BASIC    Collection Time: 08/04/20  3:47 AM   Result Value Ref Range    Sodium 134 (L) 136 - 145 mmol/L    Potassium 4.9 3.5 - 5.1 mmol/L    Chloride 106 97 - 108 mmol/L    CO2 22 21 - 32 mmol/L    Anion gap 6 5 - 15 mmol/L    Glucose 305 (H) 65 - 100 mg/dL    BUN 31 (H) 6 - 20 MG/DL    Creatinine 1.16 0.70 - 1.30 MG/DL    BUN/Creatinine ratio 27 (H) 12 - 20      GFR est AA >60 >60 ml/min/1.73m2    GFR est non-AA >60 >60 ml/min/1.73m2    Calcium 7.9 (L) 8.5 - 10.1 MG/DL   GLUCOSE, POC    Collection Time: 08/04/20  6:01 AM   Result Value Ref Range    Glucose (POC) 275 (H) 65 - 100 mg/dL    Performed by Cynthia Balbuena (CON)        Assessment:  Stable Post Op    Plan:  DVT Prophylaxis:Eliquis 2.5 BID x 2 weeks  Physical Therapy: WBAT  Discharge Planning: home with home health             F/u in office in 2 weeks  Pain control: tyelfelixl, mobic  Acute blood loss anemia: hgb 10.5, normal post op finding, pt asymptomatic, will continue to monitor  Obesity: BMI 31.60, chronic, encourage OOB for all meals, use of IS, good pulmonary toilet, may limit pt's ability to progress with PT/OT

## 2020-08-04 NOTE — ANESTHESIA POSTPROCEDURE EVALUATION
Post-Anesthesia Evaluation and Assessment    Patient: Ratna Mckoy MRN: [id number]  SSN: [id number]    YOB: 1950  Age: 78 y.o. Sex: male      I have evaluated the patient and they are stable and ready for discharge from the PACU. Cardiovascular Function/Vital Signs  Visit Vitals  /75   Pulse 74   Temp 36.3 °C (97.4 °F)   Resp 16   Ht 6' (1.829 m)   Wt 105.7 kg (233 lb)   SpO2 96%   BMI 31.60 kg/m²       Patient is status post General anesthesia for Procedure(s):  LEFT TOTAL KNEE ARTHROPLASTY. Nausea/Vomiting: None    Postoperative hydration reviewed and adequate. Pain:  Pain Scale 1: Numeric (0 - 10) (08/03/20 1651)  Pain Intensity 1: 0 (08/03/20 1651)   Managed    Neurological Status:   Neuro (WDL): Within Defined Limits (08/03/20 1600)  Neuro  LUE Motor Response: Purposeful (08/03/20 1653)  LLE Motor Response: Numbness (08/03/20 1653)  RUE Motor Response: Purposeful (08/03/20 1653)  RLE Motor Response: Numbness (08/03/20 1653)   At baseline    Mental Status, Level of Consciousness: Alert and  oriented to person, place, and time    Pulmonary Status:   O2 Device: Room air (08/03/20 1600)   Adequate oxygenation and airway patent    Complications related to anesthesia: None    Post-anesthesia assessment completed. No concerns    Signed By: Bertie Siemens, MD     August 3, 2020              Procedure(s):  LEFT TOTAL KNEE ARTHROPLASTY. spinal    <BSHSIANPOST>    INITIAL Post-op Vital signs:   Vitals Value Taken Time   /59 8/3/2020  4:00 PM   Temp 36.5 °C (97.7 °F) 8/3/2020  2:56 PM   Pulse 70 8/3/2020  4:14 PM   Resp 19 8/3/2020  4:14 PM   SpO2 99 % 8/3/2020  4:14 PM   Vitals shown include unvalidated device data.

## 2020-08-04 NOTE — DISCHARGE INSTRUCTIONS
Discharge Instructions Knee Replacement  Dr. Jaylon Hemphill      Patient Name  Alfredo Roberts  Date of procedure  8/3/2020    Procedure  Procedure(s):  LEFT TOTAL KNEE ARTHROPLASTY  Surgeon  Surgeon(s) and Role:     Savana Morgan MD - Primary  Date of discharge: 8/4/20  PCP: Delicia Liao NP    Follow up care   Follow up visit with Dr. Jaylon Hemphill in 2 weeks. Call [phone] to make an appointment   The staples in your knee incision will be taken out at this follow up appointment    Activity at home   Take a short walk every hour; except at night when sleeping   Do your Home Exercise Program 3 times every day    After exercising lie down and elevate your leg on pillows for 15-30 minutes to decrease swelling   Refer to your patient notebook for more information  Bathing and caring for your incision   You may take a shower and wash your incision with soap and water   Change your knee dressing daily for one week. You may then leave your incision open to air unless you see drainage from your knee. Preventing blood clots   Take Eliquis 2.5 mg twice a day for 2 weeks as prescribed   Call Dr. Jaylon Hemphill if you have side effects of blood thinning medication: bleeding, bruising, upset stomach, or diarrhea.  Call Dr. Jaylon Hemphill for signs of a blood clot in your leg: calf pain, tenderness, redness, swelling of lower leg   Preventing lung congestion   Use your incentive spirometer 4 times a day; do 10 repetitions each time   Remember to keep the small blue ball between the two arrows when taking a slow, deep breath   Pain Management   Get up and walk a short distance to relieve pain and stiffness.  Place ice wrap on your knee except when you are walking.  The gel ice packs should be changed about every 4 hours   Elevate your leg on pillows for 15-30 minutes    Take Tylenol 1000mg (take two 500mg tablets) every 6 hours for the next 2 weeks   Take anti-inflammatory medication (NSAID) as prescribed each day.   If needed, take a narcotic pain pill every 4-6 hours as prescribed. Take with a small amount of food.  As your pain decreases, take the narcotics less often or take ½ of a pill   Call Dr. Lay Form if you have side effects from your narcotic pain medication: itching, drowsiness, dizziness, upset stomach, dry mouth, constipation or if you medication is not relieving your pain.  YOUR PRESCRIPTIONS HAVE BEEN ELECTRONICALLY SENT TO YOUR PHARMACY  Diet after surgery   You may resume your normal diet. Include vegetables, fruit, whole grains, lean meats, and low-fat dairy products. Eat food high in fiber    Drink plenty of fluids, including 8 cups of water daily   Take over-the-counter stool softeners and laxatives to prevent constipation    Avoid after surgery   Do not take any over-the-counter medication for pain except Tylenol     Do not take more than 4000 mg (4 Grams) of Tylenol in 24 hours     Do not drink alcoholic beverages   Do not smoke   Do not drive until seen for follow up appointment   Do not place frozen gel pack directly on your skin. It can cause frostbite.  Do not take a tub bath, swim or get in a hot tub for 6 weeks  Prevention of falls and safety at home   Set up an area where you can rest comfortably leaving space around furniture to allow you to walk with your walker   Keep stairs, hallways and bathrooms well lit; especially at night   Arrange for care for your pets   Keep your home free of clutter   Call Dr. Lay Form at [phone] for:   Pain that is not relieved by pain medication, ice and activity   Side effects of medications   Increased/spread of bruising   Warning signs of infection:  ? persistent fever greater than 100 degrees  ?  shaking or chills  ? increased redness, tenderness, swelling or drainage from incision  ? increased pain during activity or rest   Warning signs of a blood clot in your leg:  ? increased pain in your calf  ? tenderness or redness  ? increased swelling or knee, calf, ankle or foot    If you call after 5pm or on a weekend, the on call physician will return your phone call  Call your Primary Care Doctor for:    Concerns about your medical conditions such as diabetes, high blood pressure, asthma, congestive heart failure.    Blood sugars greater than 180   Persistent headache or dizziness   Coughing or congestion   Constipation or diarrhea   Burning when you go to the bathroom   Abnormal heart rate (fast or slow)               Call 911 and go to the nearest hospital for:    Sudden increased shortness of breath   Sudden onset of chest pain   Difficulty breathing   Localized chest pain with coughing or taking a deep breath

## 2020-08-04 NOTE — PROGRESS NOTES
Transition of Care Plan  Observation status  Observation notice provided in writing to patient and/or caregiver as well as verbal explanation of the policy. Patients who are in outpatient status also receive the Observation notice. Medicare and State letters given to patient and signed copies in chart    Cm met with patient in his room he lives in a trailer [place] other family members [place]. His nephew is staying with him for a few days. Patient is in agreement with home health  No preference but one accepts patient's insurance [employer]     The Plan for Transition of Care is related to the following treatment goals: home health    The Patient was provided with a choice of provider and agrees   with the discharge plan. [x] Yes [] No    Freedom of choice list was provided with basic dialogue that supports the patient's individualized plan of care/goals, treatment preferences and shares the quality data associated with the providers. [x] Yes [] No    Cm has sent referrals via allscipts to several agencies as patient had no preference providing the agency accepts his insruance--[employer]. [place] of choice letter placed in chart. [facility] [address] accepted patient  [phone]  Name and number placed on discharge instructions. Patient said his family lives in the trailer park near him   His nephew will be staying with him for a few nights. His sister and brother will assist with meals. Family to transport home. Care Management Interventions  PCP Verified by CM: Yes  Mode of Transport at Discharge: (car)  Transition of Care Consult (CM Consult): Discharge Planning  Physical Therapy Consult: Yes  Occupational Therapy Consult: Yes  Current Support Network: Lives Alone(lives alone in trailer park but has good family support as they live in the same [place] area.   No AMD  )  Confirm Follow Up Transport: Family  The Plan for Transition of Care is Related to the Following Treatment Goals : home health  The Patient and/or Patient Representative was Provided with a Choice of Provider and Agrees with the Discharge Plan?: Yes  Freedom of Choice List was Provided with Basic Dialogue that Supports the Patient's Individualized Plan of Care/Goals, Treatment Preferences and Shares the Quality Data Associated with the Providers?: Yes  Discharge Location  Discharge Placement: Home with home health

## 2020-08-04 NOTE — PROGRESS NOTES
Patient progressing well. Pain is controlled and patient stable. Wound is bleeding and we are reinforcing bandage as needed. Physician notified. Continuing to monitor.

## 2020-08-04 NOTE — PROGRESS NOTES
Occupational Therapy    Orders received, chart reviewed and patient evaluated by occupational therapy. Pending progression with skilled acute occupational therapy, recommend:  No skilled occupational therapy/ follow up rehabilitation needs identified at this time. Recommend with nursing patient to complete as able in order to maintain strength, endurance and independence: OOB to chair 3x/day with [facility] and functional mobility to the bathroom with [facility]. Thank you for your assistance. Full evaluation to follow.      Chelsey Farris, OT

## 2020-08-04 NOTE — ROUTINE PROCESS
Bedside shift change report given to Jennifer Valle (oncoming nurse) by Valentine Marte RN(offgoing nurse). Report given with SBAR.

## 2020-08-04 NOTE — PROGRESS NOTES
Problem: Mobility Impaired (Adult and Pediatric)  Goal: *Acute Goals and Plan of Care (Insert Text)  Description: FUNCTIONAL STATUS PRIOR TO ADMISSION: Patient was independent and active without use of DME.    HOME SUPPORT PRIOR TO ADMISSION: The patient lived alone with family and friends to provide assistance. Physical Therapy Goals  Initiated 8/3/2020    1. Patient will move from supine to sit and sit to supine , scoot up and down, and roll side to side in bed with modified independence within 4 days. 2. Patient will perform sit to stand with modified independence within 4 days. 3. Patient will ambulate with modified independence for 150 feet with the least restrictive device within 4 days. 4. Patient will ascend/descend 4 stairs with cane and one handrail(s) with modified independence within 4 days. 5. Patient will perform home exercise program per protocol with independence within 4 days. 6. Patient will demonstrate AROM 0-90 degrees in operative joint within 4 days. Outcome: Progressing Towards Goal  PHYSICAL THERAPY NOTE  Patient: Julee De La Vega (1 y.o. male)  Date: 8/4/2020  Primary Diagnosis: Primary osteoarthritis of left knee [M17.12]  Procedure(s) (LRB):  LEFT TOTAL KNEE ARTHROPLASTY (Left) 1 Day Post-Op   Precautions:  Fall, WBAT    Julee De La Vega was seen for morning PT session. He is walking approx 200 feet with the RW and with CGA. Gait steady overall w/no LOB or knee buckling. Only c/o of soreness and stiffness of LLE/knee. Patient was instructed in stair management and able to negotiate 4 stairs using both rails with CGA. Reviewed activity recommendations and restrictions with patient. Julee De La Vega is clear for discharge home from a mobility standpoint and RN is aware.   .    Morning session functional mobility:  Bed Mobility:     Supine to Sit: Supervision  Sit to Supine: (remained seated EOB)     Transfers:  Sit to Stand: Stand-by assistance;Assist x1  Stand to Sit: Stand-by assistance;Assist x1        Bed to Chair: Contact guard assistance              Balance:   Sitting: Intact  Standing: Intact; With support  Standing - Static: Constant support;Good(RW)  Standing - Dynamic : Constant support;Good  Ambulation/Gait Training:  Distance (ft): 200 Feet (ft)  Assistive Device: Gait belt;Walker, rolling  Ambulation - Level of Assistance: Contact guard assistance        Gait Abnormalities: Antalgic;Decreased step clearance        Base of Support: Widened  Stance: Left decreased  Speed/Keila: Pace decreased (<100 feet/min); Slow  Step Length: Left shortened;Right shortened  Swing Pattern: Left asymmetrical              Stairs:  Number of Stairs Trained: 4  Stairs - Level of Assistance: Contact guard assistance;Assist X1  Rail Use: Both    Thank you,  Chiquis Mcguire,PTA   Time Calculation: 27 mins

## 2020-08-04 NOTE — CONSULTS
JOSE MARTIN SCHWARTZ  CLINICAL NURSE SPECIALIST CONSULT  [facility]    INITIAL NOTE    Presentation   Bryant Castañeda is a 78 y.o. male with a PMH of type two diabetes, CAD s/p PCI, tobacco use, HTN, arthritis who presented to outpatient orthopaedics with a c/o left knee pain and diagnosed with left knee osteoarthritis. He was admitted after his left knee arthoplasty. Current clinical course has been complicated by hyperglycemia. Diabetes: Patient has known Type two diabetes, treated with levemir, metfromin and glipizide PTA. Admitting glucose 82 and A1C 7.6%. Consulted by Provider for advanced diabetes nursing assessment and care, specifically related to   [x] Inpatient management strategy      Diabetes-related medical history  Acute complications: Hyperglycemia without acidosis  Neurological complications  Peripheral neuropathy  Microvascular disease: None   Macrovascular disease  CAD      Diabetes medication history  Drug class Currently in use Discontinued Never used   Biguanide Metformin 750mg BID     DDP-4 inhibitor       Sulfonylurea Glipizide 5 mg daily     Thiazolidinedione      GLP-1 RA      SGLT-2 inhibitors      Basal insulin Levemir 61 units at 9pm     Fixed Dose  Combinations      Bolus insulin        Subjective   I met with the diabetes lady last week and I learned a lot from her.     Patient is retired and lives independently  21 year history of type two diabetes  Compliant with medication and twice daily glucose monitoring     Patient reports the following home diabetes self-care practices:  24-hour Dietary Recall:  Breakfast: dali Kofi sausage biscuit; cereal and milk or oatmeal; sausage gravy and biscuit. Lunch: tomato or turkey sandwich on ww bread, salad (lettuce, onion, cuce, tomato, turkey or ham, balbuena bits, ranch or Vietnamese dressing.    Dinner: salad; tuna salad with Joe Vaughn,   Snacks: ritz and cheese or banana   Beverages: zero sodas, unsweet tea, started increasing water intake. Alcohol: 4-12 beers, natural light 4-5 days/week. Physical activity pattern  [x] Limited    Monitoring pattern  [x] Breakfast 110-150  [x] Bedtime 2 hrs after dinner: up to 170  May have glucose in the high 50s 1-2 times a month. No specific time of the day    Taking medications pattern  [x] Consistent administration  [x] Affordable    Social determinants of health impacting diabetes self-management practices   Concerned that you need to know more about how to stay healthy with diabetes    Objective   Physical exam  General Alert, oriented and in no acute distress/ill-appearing. Conversant and cooperative. Vital Signs   Visit Vitals  /64   Pulse 72   Temp 97.4 °F (36.3 °C)   Resp 16   Ht 6' (1.829 m)   Wt 105.7 kg (233 lb)   SpO2 96%   BMI 31.60 kg/m²     Skin  Warm and dry. Acanthosis noted along neckline. No lipohypertrophy or lipoatrophy noted at injection sites   Heart   Regular rate and rhythm. No murmurs, rubs or gallops  Lungs  Clear to auscultation without rales or rhonchi  Extremities No foot wounds    Diabetic foot exam:    Left Foot    Vibratory and Filament test: deferred due to clinical condition. Surgical ACE wrap    Right Foot   Visual Exam: normal    Pulse DP: 1+ (weak)   Filament test: 3/6   Vibratory sensation: absent DP & PT pulses +2.      Laboratory  Lab Results   Component Value Date/Time    Hemoglobin A1c 7.6 (H) 07/20/2020 12:09 PM     No results found for: LDL, LDLC, DLDLP  Lab Results   Component Value Date/Time    Creatinine 1.16 08/04/2020 03:47 AM     Lab Results   Component Value Date/Time    Sodium 134 (L) 08/04/2020 03:47 AM    Potassium 4.9 08/04/2020 03:47 AM    Chloride 106 08/04/2020 03:47 AM    CO2 22 08/04/2020 03:47 AM    Anion gap 6 08/04/2020 03:47 AM    Glucose 305 (H) 08/04/2020 03:47 AM    BUN 31 (H) 08/04/2020 03:47 AM    Creatinine 1.16 08/04/2020 03:47 AM    BUN/Creatinine ratio 27 (H) 08/04/2020 03:47 AM    GFR est AA >60 08/04/2020 03:47 AM    GFR est non-AA >60 2020 03:47 AM    Calcium 7.9 (L) 2020 03:47 AM     No results found for: ALT    Factors affecting BG pattern  Factor Dose Comments   Nutrition:  Carb-controlled meals   60 grams/meal    Drugs:  Steroids Dexamethasone 4mg x1 in OR     Pain S/p left knee arthoplasty  On tylenol     Blood glucose pattern        Assessment and Plan   Nursing Diagnosis Risk for unstable blood glucose pattern   Nursing Intervention Domain 5250 Decision-making Support   Nursing Interventions Examined current inpatient diabetes control   Explored factors facilitating and impeding inpatient management  Identified self-management practices impeding diabetes control  Explored corrective strategies with patient and responsible inpatient provider   Informed patient of rational for insulin strategy while hospitalized  Informed patient on the difference between Lantus and Levemir  Hypoglycemia management   Routine foot care  Impact of steroids on glucose     Evaluation   Mr Aguilar Gardner is a 78year old gentleman with controlled diabetes on metformin who is experiencing hyperglycemia after his left knee arthroplasty. Current glucose can certainly be impacted by the one time steroid given in the OR in addition of oral and SQ antihyperglycemic agents being held since admission. In patient glucose goal 100-180. Recommendations   Recommend:  1. Agree with low dose lantus 0.2 units/k units/day. Advance to 0.3 units/kg tomorrow if fasting glucose over 200.    2. Correctional insulin at normal sensitivity ACHS    3. Add low dose mealtime humalog 0.05 units/k units with meals. Hold if patient consumes less than 50% of carbohydrates on meal tray. Advance to 0.1 units/kg tomorrow if still experiencing pre-prandial hyperglycemia     4. Please hold metformin in the inpatient setting as this can exacerbate hypoglycemia at this time. On discharge:  [id number] Allegra Raya to resume PTA antihyperglycemic agents.   Patient instructed not to give levemir insulin until 24 hours after last Lantus injection. Billing Code(s)   I personally reviewed chart, notes, data and current medications in the medical record, and examined the patient at bedside before making care recommendations. Thank you for including us in their care. I spent 35 minutes in direct patient care today for this patient.   Time includes chart review, face to face with patient and collaboration with interdisciplinary care team.       KIMBERLY Matute  [facility]  Access via [facility]  [phone]

## 2020-08-04 NOTE — DISCHARGE SUMMARY
Orthopedic Service Discharge Summary    Patient ID:  Dena Kumar  [id number]  male  78 y.o.  1950    Admit date: 8/3/2020    Discharge date and time: 8/4/2020  4:13 PM     Admitting Physician: Michaelle Witt MD     Discharge Physician: Michaelle Witt MD    Consulting Physician(s): Treatment Team: Utilization Review: Ricki Figueroa RN; Care Manager: Heaven Gonzalezer, [facility]    Date of Surgery: 8/3/2020     Preoperative Diagnosis:  DJD LEFT KNEE    Postoperative Diagnosis: DJD LEFT KNEE    Procedure(s): Procedure(s):  LEFT TOTAL KNEE ARTHROPLASTY    Surgeon: Kelsey Tejeda) and Role:     Fiorella Aquino MD - Primary      Anesthesia:  General    Preoperative Medical Clearance:                           HPI:  Pt is a 78 y.o. male who has a history of Primary osteoarthritis of left knee [M17.12]  with pain and limitations of activities of daily living who presents at this time for a left TKR following the failure of conservative management. PMH:   Past Medical History:   Diagnosis Date    Arthritis     OSTEO-KNEES    CAD (coronary artery disease)     Diabetes ([facility] [address]     Exposure to 2019 novel coronavirus 5/21/2020    High cholesterol     Hypertension        Medications upon admission :   Prior to Admission Medications   Prescriptions Last Dose Informant Patient Reported? Taking? Insulin Needles, Disposable, (BD Ultra-Fine Micro Pen Needle) 32 gauge x 1/4\" ndle 8/2/2020 at Unknown time  Yes Yes   Sig: by Does Not Apply route. amLODIPine (NORVASC) 10 mg tablet 8/3/2020 at 0900  Yes Yes   Sig: Take 10 mg by mouth daily. ammonium lactate (AMLACTIN) 12 % topical cream 8/1/2020  Yes No   Sig: Apply  to affected area two (2) times a day. rub in to affected area well   aspirin delayed-release 81 mg tablet 7/29/2020  Yes No   Sig: Take  by mouth daily. atorvastatin (LIPITOR) 40 mg tablet 8/2/2020 at Unknown time  Yes Yes   Sig: Take  by mouth daily.    carvediloL (COREG) 12.5 mg tablet 8/3/2020 at 0900  Yes Yes   Sig: Take  by mouth two (2) times daily (with meals). glipiZIDE SR (GLUCOTROL XL) 10 mg CR tablet 2020 at Unknown time  Yes Yes   Sig: Take 5 mg by mouth daily. glucose blood VI test strips (Accu-Chek Ada Plus test strp) strip 2020 at Unknown time  Yes Yes   Sig: by Does Not Apply route See Admin Instructions. insulin detemir U-100 (Levemir U-100 Insulin) 100 unit/mL injection 2020 at Unknown time Self Yes Yes   Si Units by SubCUTAneous route nightly. lisinopriL (PRINIVIL, ZESTRIL) 40 mg tablet 2020 at Unknown time  Yes Yes   Sig: Take 40 mg by mouth daily. metFORMIN ER (GLUCOPHAGE XR) 750 mg tablet 2020 at Unknown time Self Yes Yes   Sig: Take 750 mg by mouth two (2) times a day. ticagrelor (BRILINTA) 90 mg tablet 2020  Yes No   Sig: Take  by mouth two (2) times a day. Facility-Administered Medications: None        Allergies:  No Known Allergies     Hospital Course: The patient underwent surgery. Complications:  None; patient tolerated the procedure well. Was taken to the PACU in stable condition and then transferred to the Orthopedics floor. Condition on discharge: good    Perioperative Antibiotics:  Ancef     Postoperative Pain Management:  Acetaminophen, mobic    DVT Prophylaxis: eliquis, scds    Postoperative transfusions:     none Banked PRBCs     Post Op complications: none    Hemoglobin at discharge:    Lab Results   Component Value Date/Time    HGB 10.5 (L) 2020 03:47 AM    INR 1.0 2020 12:09 PM       Dressing was changed on POD # 2. Incision - clean, dry and intact. No significant erythema or swelling. Neurovascular exam within normal limits. Wound appears to be healing without any evidence of infection. Physical Therapy started on the day following surgery and progressed to ambulation with the aid of a rolling walker for distances of 200 feet with supervision. Range of motion  limited by pain.   At the time of discharge, able to go up and down stairs and had understanding of precautions needed following surgery. Discharged to: home with home health. Discharge instructions:  -See Full Summary of discharge instructions attached  -Anticoagulate with eliquis  -Resume pre hospital diet            -Resume home medications   Discharge Medication List as of 8/4/2020  2:13 PM      START taking these medications    Details   apixaban (ELIQUIS) 2.5 mg tablet Take 1 Tab by mouth every twelve (12) hours every twelve (12) hours. Indications: deep vein thrombosis prevention, Normal, Disp-30 Tab,R-0      meloxicam (MOBIC) 7.5 mg tablet Take 1 Tab by mouth daily. , Normal, Disp-30 Tab,R-0      oxyCODONE IR (ROXICODONE) 5 mg immediate release tablet Take 1 Tab by mouth every eight (8) hours as needed for Pain for up to 10 days. Max Daily Amount: 15 mg., Normal, Disp-10 Tab,R-0      senna-docusate (PERICOLACE) 8.6-50 mg per tablet Take 1 Tab by mouth two (2) times a day., Normal, Disp-30 Tab,R-0         CONTINUE these medications which have NOT CHANGED    Details   carvediloL (COREG) 12.5 mg tablet Take  by mouth two (2) times daily (with meals). , Historical Med      insulin detemir U-100 (Levemir U-100 Insulin) 100 unit/mL injection 61 Units by SubCUTAneous route nightly., Historical Med      glucose blood VI test strips (Accu-Chek Ada Plus test strp) strip by Does Not Apply route See Admin Instructions. , Historical Med      amLODIPine (NORVASC) 10 mg tablet Take 10 mg by mouth daily. , Historical Med      atorvastatin (LIPITOR) 40 mg tablet Take  by mouth daily. , Historical Med      Insulin Needles, Disposable, (BD Ultra-Fine Micro Pen Needle) 32 gauge x 1/4\" ndle by Does Not Apply route., Historical Med      glipiZIDE SR (GLUCOTROL XL) 10 mg CR tablet Take 5 mg by mouth daily. , Historical Med      lisinopriL (PRINIVIL, ZESTRIL) 40 mg tablet Take 40 mg by mouth daily. , Historical Med      metFORMIN ER (GLUCOPHAGE XR) 750 mg tablet Take 750 mg by mouth two (2) times a day., Historical Med      ammonium lactate (AMLACTIN) 12 % topical cream Apply  to affected area two (2) times a day.  rub in to affected area well, Historical Med      ticagrelor (BRILINTA) 90 mg tablet Take  by mouth two (2) times a day., Historical Med         STOP taking these medications       aspirin delayed-release 81 mg tablet Comments:   Reason for Stopping:            per medical continuation form  -Discharge activity: Activity as tolerated  -Ambulate with Walkers, Type: Rolling Walker, appropriate total joint protocol  -Wound Care Keep wound clean and dry, Reinforce dressing PRN, Ice to area for comfort and As directed  -Follow up in office in 2 weeks      Signed:  Live Luna PA-C  8/4/2020  12:56 PM        No att. providers found

## 2020-08-04 NOTE — PROGRESS NOTES
OCCUPATIONAL THERAPY EVALUATION/DISCHARGE  Patient: Jerry Tobar (9 y.o. male)  Date: 8/4/2020  Primary Diagnosis: Primary osteoarthritis of left knee [M17.12]  Procedure(s) (LRB):  LEFT TOTAL KNEE ARTHROPLASTY (Left) 1 Day Post-Op   Precautions:  Fall, WBAT    ASSESSMENT  Based on the objective data described below, the patient presents with decreased higher level mobility/balance, as well as, decreased distal LE reaching; however, he is noted to be functioning at high level, completing RW level grooming with standby assist, RW toileting/LE dressing with CGA, bathing with Supervision and self-feeding and UB dressing with [place]. Pt reports good in-home ADL/IADL assist from nephew who will be staying with him. Pt educated on use of reacher for distal LE dressing, with good understanding noted. OT educated pt on use of tub-transfer bench to assist with shower safety; however, pt declined further education and stated he will plan to have nephew assist with showers and follow up with shower chair if he feels he needs. Given pt's high level of safe functional independence, available PRN ADL/IADL assist within home, physical therapy following mobility/balane deficits and all AE/DME needs fulfilled, no other acute OT needs identified, with OT answering pt's questions/concerns and pt thanking therapist for his efforts. D/C acute OT. Current Level of Function (ADLs/self-care): CGA for dynamic ADL tasks at [facility]    Functional Outcome Measure: The patient scored 70/100 on the Barthel Index outcome measure. Other factors to consider for discharge: none     PLAN :  Recommendation for discharge: (in order for the patient to meet his/her long term goals)  No skilled occupational therapy/ follow up rehabilitation needs identified at this time.     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     OBJECTIVE DATA SUMMARY:   HISTORY:   Past Medical History:   Diagnosis Date    Arthritis     OSTEO-KNEES    CAD (coronary artery disease)     Diabetes ([facility] [address]     Exposure to 2019 novel coronavirus 5/21/2020    High cholesterol     Hypertension      Past Surgical History:   Procedure Laterality Date    CARDIAC SURG PROCEDURE UNLIST  11/2019    placement of stent    HX OTHER SURGICAL      oral surgery       Prior Level of Function/Environment/Context: Independent with ADLs/IADLs. Nephew plans to stay with pt upon discharge. Expanded or extensive additional review of patient history:     Home Situation  Home Environment: Private residence  # Steps to Enter: 2  Rails to Enter: Yes  Hand Rails : Right  Wheelchair Ramp: No  One/Two Story Residence: One story  Living Alone: Yes  Support Systems: Friends \ neighbors  Patient Expects to be Discharged to[de-identified] Private residence  Current DME Used/Available at Home: Adaptive bathing aides, Cane, straight, Raised toilet seat, Walker, rollator  Tub or Shower Type: Tub/Shower combination    Hand dominance: Right    EXAMINATION OF PERFORMANCE DEFICITS:  Cognitive/Behavioral Status:  Neurologic State: Alert  Orientation Level: Oriented X4  Cognition: Appropriate decision making; Appropriate for age attention/concentration; Appropriate safety awareness  Perception: Appears intact  Perseveration: No perseveration noted  Safety/Judgement: Awareness of environment; Insight into deficits    Hearing:   Auditory  Auditory Impairment: None    Vision/Perceptual:    Acuity: Within Defined Limits       Range of Motion:  AROM: Generally decreased, functional  PROM: Generally decreased, functional    Strength:  Strength: Generally decreased, functional    Coordination:  Coordination: Within functional limits  Fine Motor Skills-Upper: Left Intact; Right Intact    Gross Motor Skills-Upper: Left Intact; Right Intact    Tone & Sensation:  Tone: Normal  Sensation: Intact    Balance:  Sitting: Intact  Standing: Impaired; With support  Standing - Static: Good;Constant support  Standing - Dynamic : Good;Constant support    Functional Mobility and Transfers for ADLs:  Transfers:  Sit to Stand: Contact guard assistance  Stand to Sit: Stand-by assistance  Bed to Chair: Contact guard assistance  Bathroom Mobility: Contact guard assistance    ADL Assessment:  Feeding: Independent    Oral Facial Hygiene/Grooming: Stand-by assistance    Bathing: Supervision    Upper Body Dressing: Independent    Lower Body Dressing: Contact guard assistance    Toileting: Contact guard assistance    ADL Intervention and task modifications:    Patient instructed and indicated understanding the benefits of maintaining activity tolerance, functional mobility, and independence with self care tasks during acute stay  to ensure safe return home and to baseline. Encouraged patient to increase frequency and duration OOB, be out of bed for all meals, perform daily ADLs (as approved by RN/MD regarding bathing etc), and performing functional mobility to/from bathroom. Pt educated on safe transfer techniques, with specific emphasis on proper hand placement to push up from seated surface rather than attempt to pull self up, fully positioning self in-front of desired seated location, feeling chair on back of legs and reaching back with 1-2 UE to slowly lower self to seated position. Cognitive Retraining  Safety/Judgement: Awareness of environment; Insight into deficits    Bathing: Patient instructed and indicated understanding when bathing to not submerge wound in water, stand to shower or sponge bathe, cover wound with plastic and tape to ensure no water reaches bandage/wound without cues.     Dressing joint: Patient instructed and demonstrated understanding to don/doff Left LE first/last with Contact guard assistance. Patient instructed and demonstrated to don all clothing while sitting prior to standing, doff all clothing to knees while standing, then sit to doff clothing off from knees to feet in order to facilitate fall prevention, pain management, and energy conservation with Contact guard assistance. Dressing joint reach exercise: To increase independence with lower body dressing, patient instructed and demonstrated to reach down Left LE in a seated position slowly to prevent tearing/shearing until slight pull is felt, hold at end range for 10 seconds, then return to starting upright position with Supervision. Patient instructed to complete three sets of three repetitions each daily. Home safety: Patient instructed and indicated understanding on home modifications and safety (raise height of ADL objects, appropriate height of chair surfaces, recliner safety, change of floor surfaces, clear pathways) to increase independence and fall prevention. Standing: Patient instructed and demonstrated during ADLs to walk up to sink/counter top/surfaces, step into walker to increase safety of joint and fall prevention with Contact guard assistance. Patient educated about knee anatomy and educated to avoid rotation of Left LE. Instructed to apply concept to ADLs within the home (no twisting of knee during reaching across body, square off while using objects, slide objects along surfaces). Patient instructed and indicated understanding to increase amount of time standing, observe standing position during ADLs in order to increase even weight bearing through bilateral LEs in order to increase independence with ADLs. Goal to be reached 30 days post - op, per orthopedic surgeon or per PT.     Tub/shower transfer: Patient instructed and indicated understanding regarding when it is safe to begin transfer into tub/shower (complete stairs with PT, advance exercises with PT high enough to clear tub/shower height). Patient instructed to use the same technique as used with stairs when entering and exiting tub/shower (\"up with the non-surgical, down with the surgical leg\"). Functional Measure:  Barthel Index:    Bathin  Bladder: 10  Bowels: 10  Groomin  Dressin  Feeding: 10  Mobility: 10  Stairs: 5  Toilet Use: 5  Transfer (Bed to Chair and Back): 10  Total: 70/100        The Barthel ADL Index: Guidelines  1. The index should be used as a record of what a patient does, not as a record of what a patient could do. 2. The main aim is to establish degree of independence from any help, physical or verbal, however minor and for whatever reason. 3. The need for supervision renders the patient not independent. 4. A patient's performance should be established using the best available evidence. Asking the patient, friends/relatives and nurses are the usual sources, but direct observation and common sense are also important. However direct testing is not needed. 5. Usually the patient's performance over the preceding 24-48 hours is important, but occasionally longer periods will be relevant. 6. Middle categories imply that the patient supplies over 50 per cent of the effort. 7. Use of aids to be independent is allowed. John Arguelles., Barthel, D.W. ([id number]). Functional evaluation: the Barthel Index. [facility] (14)2. Laurent Hodgkins der [place], J.J.M.F, Airam Franklin., Sybil Melgar., [place], [address] [place] [address] (). Measuring the change indisability after inpatient rehabilitation; comparison of the responsiveness of the Barthel Index and Functional Rice Measure. Journal of Neurology, Neurosurgery, and Psychiatry, 66(4), 687-902. Dayan Gutierrez, N.J.A, Clementina Ramirez,  SALEEM.J.M, & Nik Maddox MOmarA. (2004.) Assessment of post-stroke quality of life in cost-effectiveness studies: The usefulness of the Barthel Index and the EuroQoL-5D.  Quality of Life Research, 13, 985-58     Occupational Therapy Evaluation Charge Determination   History Examination Decision-Making   LOW Complexity : Brief history review  LOW Complexity : 1-3 performance deficits relating to physical, cognitive , or psychosocial skils that result in activity limitations and / or participation restrictions  LOW Complexity : No comorbidities that affect functional and no verbal or physical assistance needed to complete eval tasks       Based on the above components, the patient evaluation is determined to be of the following complexity level: LOW   Pain Rating:  No c/o pain    Activity Tolerance:   Good and requires rest breaks  Please refer to the flowsheet for vital signs taken during this treatment. After treatment patient left in no apparent distress:    Call bell within reach and sitting EOB, with RN present    COMMUNICATION/EDUCATION:   The patients plan of care was discussed with: Physical therapist, Registered nurse and Case management.      Thank you for this referral.  Naif Ayala, OT  Time Calculation: 21 mins

## 2020-08-05 ENCOUNTER — PATIENT OUTREACH (OUTPATIENT)
Dept: CASE MANAGEMENT | Age: 70
End: 2020-08-05

## 2020-08-05 NOTE — PROGRESS NOTES
Patient contacted regarding recent discharge and COVID-19 risk. Discussed COVID-19 related testing which was not done at this time. Patient s/p Lt Total Knee Replacement . Care Transition Nurse/ Ambulatory Care Manager contacted the patient by telephone to perform post discharge assessment. Verified name and  with patient as identifiers. Patient has following risk factors of: diabetes. CTN/ACM reviewed discharge instructions, medical action plan and red flags related to discharge diagnosis. Reviewed and educated them on any new and changed medications related to discharge diagnosis. Advised obtaining a 90-day supply of all daily and as-needed medications. Advance Care Planning:   Does patient have an Advance Directive: not on file     Education provided regarding infection prevention, and signs and symptoms of COVID-19 and when to seek medical attention with patient who verbalized understanding. Discussed exposure protocols and quarantine from [address] you at higher risk for severe illness  and given an opportunity for questions and concerns. The patient agrees to contact the COVID-19 hotline [phone] or PCP office for questions related to their healthcare. CTN/ACM provided contact information for future reference. From CDC: Are you at higher risk for severe illness?  Wash your hands often.  Avoid close contact (6 feet, which is about two arm lengths) with people who are sick.  Put distance between yourself and other people if COVID-19 is spreading in your community.  Clean and disinfect frequently touched surfaces.  Avoid all cruise travel and non-essential air travel.  Call your healthcare professional if you have concerns about COVID-19 and your underlying condition or if you are sick.     For more information on steps you can take to protect yourself, see CDC's How to Protect Yourself      Patient/family/caregiver given information for [facility] and agrees to enroll no  Patient will be followed by [facility] for  PT, OT and SN    Plan for follow-up call in 7-14 days based on severity of symptoms and risk factors.

## 2020-08-19 ENCOUNTER — PATIENT OUTREACH (OUTPATIENT)
Dept: CASE MANAGEMENT | Age: 70
End: 2020-08-19

## 2020-08-19 VITALS
BODY MASS INDEX: 31.83 KG/M2 | DIASTOLIC BLOOD PRESSURE: 78 MMHG | WEIGHT: 235 LBS | HEART RATE: 73 BPM | TEMPERATURE: 98.6 F | SYSTOLIC BLOOD PRESSURE: 130 MMHG | OXYGEN SATURATION: 97 % | HEIGHT: 72 IN

## 2020-08-19 RX ORDER — METOPROLOL TARTRATE 25 MG/1
TABLET, FILM COATED ORAL 2 TIMES DAILY
COMMUNITY
End: 2020-10-22

## 2020-08-19 NOTE — PROGRESS NOTES
Patient resolved from Transition of Care episode on 8/19/2020  Discussed COVID-19 related testing which was not done at this time. Patient/family has been provided the following resources and education related to COVID-19:                         Signs, symptoms and red flags related to COVID-19            CDC exposure and quarantine guidelines            [facility] exposure contact - [phone]            Contact for their local Department of Health                 Patient currently reports that the following symptoms have improved:  no new symptoms. No further outreach scheduled with this CTN/ACM/LPN/HC/ MA. Episode of Care resolved. Patient has this CTN/ACM/LPN/HC/MA contact information if future needs arise.

## 2020-10-21 ENCOUNTER — OFFICE VISIT (OUTPATIENT)
Dept: PODIATRY | Age: 70
End: 2020-10-21
Payer: MEDICARE

## 2020-10-21 VITALS
DIASTOLIC BLOOD PRESSURE: 80 MMHG | OXYGEN SATURATION: 97 % | WEIGHT: 230.6 LBS | HEIGHT: 72 IN | HEART RATE: 70 BPM | TEMPERATURE: 97.3 F | BODY MASS INDEX: 31.23 KG/M2 | SYSTOLIC BLOOD PRESSURE: 143 MMHG

## 2020-10-21 DIAGNOSIS — E11.49 OTHER DIABETIC NEUROLOGICAL COMPLICATION ASSOCIATED WITH TYPE 2 DIABETES MELLITUS (HCC): ICD-10-CM

## 2020-10-21 DIAGNOSIS — E11.42 TYPE 2 DIABETES MELLITUS WITH DIABETIC POLYNEUROPATHY, WITHOUT LONG-TERM CURRENT USE OF INSULIN (HCC): Primary | ICD-10-CM

## 2020-10-21 DIAGNOSIS — B35.1 ONYCHOMYCOSIS: ICD-10-CM

## 2020-10-21 PROCEDURE — 11721 DEBRIDE NAIL 6 OR MORE: CPT | Performed by: PODIATRIST

## 2020-10-22 ENCOUNTER — HOSPITAL ENCOUNTER (OUTPATIENT)
Dept: PREADMISSION TESTING | Age: 70
Discharge: HOME | End: 2020-10-22
Payer: MEDICARE

## 2020-10-22 VITALS
BODY MASS INDEX: 30.91 KG/M2 | DIASTOLIC BLOOD PRESSURE: 70 MMHG | HEIGHT: 72 IN | HEART RATE: 68 BPM | WEIGHT: 228.18 LBS | SYSTOLIC BLOOD PRESSURE: 126 MMHG | TEMPERATURE: 98.8 F

## 2020-10-22 LAB
ABO + RH BLD: NORMAL
ANION GAP SERPL CALC-SCNC: 7 MMOL/L (ref 5–15)
APPEARANCE UR: CLEAR
BACTERIA URNS QL MICRO: NEGATIVE /HPF
BILIRUB UR QL CFM: NEGATIVE
BLOOD GROUP ANTIBODIES SERPL: NORMAL
BUN SERPL-MCNC: 19 MG/DL (ref 6–20)
BUN/CREAT SERPL: 15 (ref 12–20)
CALCIUM SERPL-MCNC: 9.4 MG/DL (ref 8.5–10.1)
CHLORIDE SERPL-SCNC: 103 MMOL/L (ref 97–108)
CO2 SERPL-SCNC: 26 MMOL/L (ref 21–32)
COLOR UR: ABNORMAL
CREAT SERPL-MCNC: 1.25 MG/DL (ref 0.7–1.3)
EPITH CASTS URNS QL MICRO: ABNORMAL /LPF
ERYTHROCYTE [DISTWIDTH] IN BLOOD BY AUTOMATED COUNT: 13.1 % (ref 11.5–14.5)
EST. AVERAGE GLUCOSE BLD GHB EST-MCNC: 154 MG/DL
GLUCOSE SERPL-MCNC: 163 MG/DL (ref 65–100)
GLUCOSE UR STRIP.AUTO-MCNC: 100 MG/DL
HBA1C MFR BLD: 7 % (ref 4–5.6)
HCT VFR BLD AUTO: 39.9 % (ref 36.6–50.3)
HGB BLD-MCNC: 12.9 G/DL (ref 12.1–17)
HGB UR QL STRIP: NEGATIVE
INR PPP: 1 (ref 0.9–1.1)
KETONES UR QL STRIP.AUTO: ABNORMAL MG/DL
LEUKOCYTE ESTERASE UR QL STRIP.AUTO: NEGATIVE
MCH RBC QN AUTO: 28.1 PG (ref 26–34)
MCHC RBC AUTO-ENTMCNC: 32.3 G/DL (ref 30–36.5)
MCV RBC AUTO: 86.9 FL (ref 80–99)
NITRITE UR QL STRIP.AUTO: NEGATIVE
NRBC # BLD: 0 K/UL (ref 0–0.01)
NRBC BLD-RTO: 0 PER 100 WBC
PH UR STRIP: 5 [PH] (ref 5–8)
PLATELET # BLD AUTO: 207 K/UL (ref 150–400)
PMV BLD AUTO: 10.4 FL (ref 8.9–12.9)
POTASSIUM SERPL-SCNC: 4.4 MMOL/L (ref 3.5–5.1)
PROT UR STRIP-MCNC: ABNORMAL MG/DL
PROTHROMBIN TIME: 10.2 SEC (ref 9–11.1)
RBC # BLD AUTO: 4.59 M/UL (ref 4.1–5.7)
RBC #/AREA URNS HPF: ABNORMAL /HPF (ref 0–5)
SODIUM SERPL-SCNC: 136 MMOL/L (ref 136–145)
SP GR UR REFRACTOMETRY: 1.02 (ref 1–1.03)
SPECIMEN EXP DATE BLD: NORMAL
UA: UC IF INDICATED,UAUC: ABNORMAL
UROBILINOGEN UR QL STRIP.AUTO: 1 EU/DL (ref 0.2–1)
WBC # BLD AUTO: 7.2 K/UL (ref 4.1–11.1)
WBC URNS QL MICRO: ABNORMAL /HPF (ref 0–4)

## 2020-10-22 PROCEDURE — 83036 HEMOGLOBIN GLYCOSYLATED A1C: CPT

## 2020-10-22 PROCEDURE — 86900 BLOOD TYPING SEROLOGIC ABO: CPT

## 2020-10-22 PROCEDURE — 36415 COLL VENOUS BLD VENIPUNCTURE: CPT

## 2020-10-22 PROCEDURE — 93005 ELECTROCARDIOGRAM TRACING: CPT

## 2020-10-22 PROCEDURE — 81001 URINALYSIS AUTO W/SCOPE: CPT

## 2020-10-22 PROCEDURE — 80048 BASIC METABOLIC PNL TOTAL CA: CPT

## 2020-10-22 PROCEDURE — 85027 COMPLETE CBC AUTOMATED: CPT

## 2020-10-22 PROCEDURE — 85610 PROTHROMBIN TIME: CPT

## 2020-10-22 NOTE — PERIOP NOTES
PAT Nurse Practitioner   Pre-Operative Chart Review/Assessment:-ORTHOPEDIC                Patient Name:  Josselyn Chance                                                           Age:   69 y.o.    :  1950     Today's Date:  10/26/2020     Date of PAT:   10/22/2020      Date of Surgery:    2020      Procedure(s):  Right Total Knee Arthroplasty     Surgeon:   Dr. Delmi Washburn                       PLAN:      1)  Medical Clearance: Dale Obando NP      2)  Cardiac Clearance: Followed by Dr. Jyoti Norton at [facility]. LOV 7/10/20, stable, no changes since. Stress test from 2020 was normal.        3)  Diabetic Treatment Consult:  Not indicated. Known DM. A1c-7.0      4)  Sleep Apnea evaluation:   LAUREN score of 4. Pt reports a diagnosis of HTN. Pt denies loud snoring that can be heard through a closed door, daytime fatigue and witnessed pauses in breathing. Pt low risk for LAUREN. Referral not sent. 5) Treatment for MRSA/Staph Aureus:  +MSSA.  Tx w/ Mupirocin      6) Additional Concerns:  Former smoker, HTN, CAD s/p stentx2, T2DM                Vital Signs:         Vitals:    10/22/20 1614   BP: 126/70   Pulse: 68   Temp: 98.8 °F (37.1 °C)   Weight: 103.5 kg (228 lb 2.8 oz)   Height: 6' (1.829 m)            ____________________________________________  PAST MEDICAL HISTORY  Past Medical History:   Diagnosis Date    Arthritis     OSTEO-KNEES    CAD (coronary artery disease) 11/19/2019    X2 STENTS    Diabetes ([address]     Exposure to 2019 novel coronavirus 2020    High cholesterol     Hypertension       ____________________________________________  PAST SURGICAL HISTORY  Past Surgical History:   Procedure Laterality Date    CARDIAC SURG PROCEDURE UNLIST  2019    placement of stent X2    HX CAROTID STENT      HX KNEE REPLACEMENT Left 2020    HX OTHER SURGICAL      oral surgery      ____________________________________________  HOME MEDICATIONS  Current Outpatient Medications   Medication Sig    mupirocin (BACTROBAN) 2 % ointment by Both Nostrils route two (2) times a day for 7 days.  mupirocin (BACTROBAN) 2 % ointment by Both Nostrils route two (2) times a day. Indications: MSSA    aspirin (ASPIR-81 PO) Take 81 mg by mouth every morning.  carvediloL (COREG) 12.5 mg tablet Take 12.5 mg by mouth two (2) times daily (with meals).  insulin detemir U-100 (Levemir U-100 Insulin) 100 unit/mL injection 61 Units by SubCUTAneous route nightly.  glucose blood VI test strips (Accu-Chek Aad Plus test strp) strip by Does Not Apply route See Admin Instructions.  amLODIPine (NORVASC) 10 mg tablet Take 10 mg by mouth every morning.  atorvastatin (LIPITOR) 40 mg tablet Take 40 mg by mouth nightly.  Insulin Needles, Disposable, (BD Ultra-Fine Micro Pen Needle) 32 gauge x 1/4\" ndle by Does Not Apply route.  ticagrelor (BRILINTA) 90 mg tablet Take 90 mg by mouth two (2) times a day.  glipiZIDE SR (GLUCOTROL XL) 10 mg CR tablet Take 10 mg by mouth two (2) times a day.  lisinopriL (PRINIVIL, ZESTRIL) 40 mg tablet Take 40 mg by mouth every morning.  metFORMIN ER (GLUCOPHAGE XR) 750 mg tablet Take 750 mg by mouth daily (with dinner). No current facility-administered medications for this encounter.       ____________________________________________  ALLERGIES  No Known Allergies   ____________________________________________  SOCIAL HISTORY  Social History     Tobacco Use    Smoking status: Former Smoker     Packs/day: 2.00     Years: 40.00     Pack years: 80.00     Last attempt to quit:      Years since quittin.8    Smokeless tobacco: Never Used   Substance Use Topics    Alcohol use:  Yes     Alcohol/week: 6.0 - 12.0 standard drinks     Types: 6 - 12 Cans of beer per week     Frequency: Never     Comment: BEER-DAILY TO EVERY 3 DAYS      ____________________________________________        Labs:     Hospital Outpatient Visit on 10/22/2020   Component Date Value Ref Range Status    Sodium 10/22/2020 136  136 - 145 mmol/L Final    Potassium 10/22/2020 4.4  3.5 - 5.1 mmol/L Final    Chloride 10/22/2020 103  97 - 108 mmol/L Final    CO2 10/22/2020 26  21 - 32 mmol/L Final    Anion gap 10/22/2020 7  5 - 15 mmol/L Final    Glucose 10/22/2020 163* 65 - 100 mg/dL Final    BUN 10/22/2020 19  6 - 20 MG/DL Final    Creatinine 10/22/2020 1.25  0.70 - 1.30 MG/DL Final    BUN/Creatinine ratio 10/22/2020 15  12 - 20   Final    GFR est AA 10/22/2020 >60  >60 ml/min/1.73m2 Final    GFR est non-AA 10/22/2020 57* >60 ml/min/1.73m2 Final    Estimated GFR is calculated using the IDMS-traceable Modification of Diet in Renal Disease (MDRD) Study equation, reported for both  Americans (GFRAA) and non- Americans (GFRNA), and normalized to 1.73m2 body surface area. The physician must decide which value applies to the patient.  Calcium 10/22/2020 9.4  8.5 - 10.1 MG/DL Final    WBC 10/22/2020 7.2  4.1 - 11.1 K/uL Final    RBC 10/22/2020 4.59  4. 10 - 5.70 M/uL Final    HGB 10/22/2020 12.9  12.1 - 17.0 g/dL Final    HCT 10/22/2020 39.9  36.6 - 50.3 % Final    MCV 10/22/2020 86.9  80.0 - 99.0 FL Final    MCH 10/22/2020 28.1  26.0 - 34.0 PG Final    MCHC 10/22/2020 32.3  30.0 - 36.5 g/dL Final    RDW 10/22/2020 13.1  11.5 - 14.5 % Final    PLATELET 06/14/2728 113  150 - 400 K/uL Final    MPV 10/22/2020 10.4  8.9 - 12.9 FL Final    NRBC 10/22/2020 0.0  0  WBC Final    ABSOLUTE NRBC 10/22/2020 0.00  0.00 - 0.01 K/uL Final    Crossmatch Expiration 10/22/2020 11/05/2020   Final    ABO/Rh(D) 10/22/2020 O POSITIVE   Final    Antibody screen 10/22/2020 NEG   Final    INR 10/22/2020 1.0  0.9 - 1.1   Final    A single therapeutic range for Vit K antagonists may not be optimal for all indications - see June, 2008 issue of Chest, American College of Chest Physicians Evidence-Based Clinical Practice Guidelines, 8th Edition.     Prothrombin time 10/22/2020 10.2  9.0 - 11.1 sec Final    Color 10/22/2020 DARK YELLOW    Final    Color Reference Range: Straw, Yellow or Dark Yellow    Appearance 10/22/2020 CLEAR  CLEAR   Final    Specific gravity 10/22/2020 1.024  1.003 - 1.030   Final    pH (UA) 10/22/2020 5.0  5.0 - 8.0   Final    Protein 10/22/2020 TRACE* NEG mg/dL Final    Glucose 10/22/2020 100* NEG mg/dL Final    Ketone 10/22/2020 TRACE* NEG mg/dL Final    Blood 10/22/2020 Negative  NEG   Final    Urobilinogen 10/22/2020 1.0  0.2 - 1.0 EU/dL Final    Nitrites 10/22/2020 Negative  NEG   Final    Leukocyte Esterase 10/22/2020 Negative  NEG   Final    WBC 10/22/2020 0-4  0 - 4 /hpf Final    RBC 10/22/2020 0-5  0 - 5 /hpf Final    Epithelial cells 10/22/2020 FEW  FEW /lpf Final    Epithelial cell category consists of squamous cells and /or transitional urothelial cells. Renal tubular cells, if present, are separately identified as such.     Bacteria 10/22/2020 Negative  NEG /hpf Final    UA:UC IF INDICATED 10/22/2020 CULTURE NOT INDICATED BY UA RESULT  CNI   Final    Ventricular Rate 10/22/2020 73  BPM Final    Atrial Rate 10/22/2020 74  BPM Final    QRS Duration 10/22/2020 88  ms Final    Q-T Interval 10/22/2020 370  ms Final    QTC Calculation (Bezet) 10/22/2020 407  ms Final    Calculated R Axis 10/22/2020 10  degrees Final    Calculated T Axis 10/22/2020 37  degrees Final    Diagnosis 10/22/2020    Final                    Value:Sinus rhythm premature ventricular complexes  Nonspecific ST abnormality  Confirmed by Miriam Morgan M.D., Saman Sim ([id number]) on 10/23/2020 11:15:33 AM      Hemoglobin A1c 10/22/2020 7.0* 4.0 - 5.6 % Final    Comment: NEW METHOD  PLEASE NOTE NEW REFERENCE RANGE  (NOTE)  HbA1C Interpretive Ranges  <5.7              Normal  5.7 - 6.4         Consider Prediabetes  >6.5              Consider Diabetes      Est. average glucose 10/22/2020 154  mg/dL Final    Special Requests: 10/22/2020 NO SPECIAL REQUESTS    Final    Culture result: 10/22/2020 MRSA NOT PRESENT. Apparent Staphylococus aureus (not MRSA noted). Final       Skin:     Denies open wounds, cuts, sores, rashes or other areas of concern in PAT assessment.           Kay Chan, NP

## 2020-10-22 NOTE — PERIOP NOTES
Patient given surgical site infection FAQs handout and hand hygiene tips sheet. Pre-operative instructions reviewed and patient verbalizes understanding of instructions. Patient has been given the opportunity to ask additional questions. Pt given 2 bottles of CHG soap and instructed in use. Pt given clear liquids instructions. Pt given COVID-19 instructions. 10/22/20 @ 1554 - SPOKE WITH ALIYA,  FOR Rubén REQUESTED PT'S OFFICE NOTES, TEST(S), AND EKG TO BE FAXED TO [facility]

## 2020-10-23 LAB
ATRIAL RATE: 74 BPM
BACTERIA SPEC CULT: NORMAL
BACTERIA SPEC CULT: NORMAL
CALCULATED R AXIS, ECG10: 10 DEGREES
CALCULATED T AXIS, ECG11: 37 DEGREES
DIAGNOSIS, 93000: NORMAL
Q-T INTERVAL, ECG07: 370 MS
QRS DURATION, ECG06: 88 MS
QTC CALCULATION (BEZET), ECG08: 407 MS
SERVICE CMNT-IMP: NORMAL
VENTRICULAR RATE, ECG03: 73 BPM

## 2020-10-26 PROBLEM — B35.1 ONYCHOMYCOSIS: Status: ACTIVE | Noted: 2020-10-26

## 2020-10-26 RX ORDER — MUPIROCIN 20 MG/G
OINTMENT TOPICAL 2 TIMES DAILY
COMMUNITY
Start: 2020-10-26 | End: 2020-11-03

## 2020-10-26 RX ORDER — MUPIROCIN 20 MG/G
OINTMENT TOPICAL 2 TIMES DAILY
Qty: 22 G | Refills: 0 | Status: SHIPPED | OUTPATIENT
Start: 2020-10-26 | End: 2020-11-03

## 2020-10-26 NOTE — PERIOP NOTES
FAXED PRE-ADMISSION TESTING REPORTS (AND FAX CONFIRMATION RECEIVED TO DR. ESPINOSA 'S OFFICE.  CALLED ON 10/26/2020 AT 1230  AND LEFT MESSAGE ON NURSE'S  VOICEMAIL, RE: ABNORMAL  NARES CULTURE, POSITIVE FOR MSSA, CLARISSA CHASE NP TREATING PATIENT PREOPERATIVELY, SEE HER NOTE IN CC.

## 2020-10-26 NOTE — PROGRESS NOTES
Cristiane Gonsales is a 69 y.o. male who presents for follow-up of Type 2 diabetes mellitus foot care/exam.  Current symptoms/problems include paresthesias of the feet: tolerable and have been unchanged. Symptoms have been present for several years. Pt states he has close f/u with PCP and states he checks his blood sugar regularly. He states his last hemoglobin A1c was 8.0%. He states he is pending total knee replacement to the left lower extremity. He states he has had slight changes in his medications from his cardiologist.  Denies any other changes in his medications. He denies any recent trauma. He denies any local/systemic signs of infection. He denies any other LE complaints. Known diabetic complications: peripheral neuropathy and cardiovascular disease  Cardiovascular risk factors: smoking/ tobacco exposure, dyslipidemia, diabetes mellitus, obesity, sedentary life style, male gender, hypertension  Current diabetic medications include oral agents (triple therapy)  Eye exam current (within one year): unknown  Current exercise: no regular exercise    Home blood sugar records: trend: fluctuating a bit    Review of Systems   Constitutional: Negative. HENT: Negative. Eyes: Negative. Respiratory: Negative. Cardiovascular: Negative. Gastrointestinal: Negative. Endocrine: Negative. Genitourinary: Negative. Musculoskeletal: Positive for arthralgias, gait problem and joint swelling. Skin: Negative. Allergic/Immunologic: Positive for immunocompromised state. Neurological: Positive for numbness. Hematological: Negative. Psychiatric/Behavioral: Negative. All other systems reviewed and are negative. OBJECTIVE  Physical Exam  Constitutional:       Appearance: Normal appearance. Cardiovascular:      Pulses:           Dorsalis pedis pulses are 2+ on the right side and 2+ on the left side.         Posterior tibial pulses are 2+ on the right side and 2+ on the left side.   Musculoskeletal:      Right ankle: He exhibits decreased range of motion. Achilles tendon exhibits no pain and no defect. Left ankle: He exhibits decreased range of motion. Achilles tendon exhibits no pain and no defect. Right lower leg: No edema. Left lower leg: No edema. Right foot: Normal range of motion. No deformity or foot drop. Left foot: Normal range of motion. No deformity or foot drop. Feet:      Right foot:      Protective Sensation: 0 sites tested. 0 sites sensed. Skin integrity: Skin integrity normal.      Toenail Condition: Right toenails are abnormally thick. Fungal disease present. Left foot:      Protective Sensation: 0 sites tested. 0 sites sensed. Skin integrity: Skin integrity normal.      Toenail Condition: Left toenails are abnormally thick. Fungal disease present. Skin:     General: Skin is warm. Capillary Refill: Capillary refill takes 2 to 3 seconds. Findings: No abrasion, signs of injury or wound. Nails: There is no clubbing. Neurological:      Mental Status: He is alert and oriented to person, place, and time. Mental status is at baseline. Sensory: Sensory deficit present. Motor: No weakness or atrophy. Gait: Gait is intact. Psychiatric:         Mood and Affect: Mood and affect normal.         Behavior: Behavior is cooperative. Cognition and Memory: Cognition normal.         Judgment: Judgment normal.         ASSESSMENT & PLAN  - Diabetes Mellitus type II, under satisfactory control  - Onychomycosis, toenails 1-5 b/l  - Diabetic peripheral neuropathy    1. Rx changes: none  2. Education: Discussed and educated patient regarding their current medical condition. Instructed patient to monitor their feet daily, be compliant with all medications and wear supportive shoe gear. 3. Sharp, aseptic onychoreduction performed to all 10 pedal digits with a nail nipper without incident.  Pt tolerated well with no dressing needed. 4.  Compliance at present is estimated to be satisfactory. Efforts to improve compliance (if necessary) will be directed at regular blood sugar monitoring: 3 times daily.   4. Follow up: 3 months

## 2020-10-29 ENCOUNTER — HOSPITAL ENCOUNTER (OUTPATIENT)
Dept: PREADMISSION TESTING | Age: 70
Discharge: HOME | End: 2020-10-29
Payer: MEDICARE

## 2020-10-29 ENCOUNTER — TRANSCRIBE ORDER (OUTPATIENT)
Dept: REGISTRATION | Age: 70
End: 2020-10-29

## 2020-10-29 DIAGNOSIS — Z01.812 PRE-PROCEDURE LAB EXAM: ICD-10-CM

## 2020-10-29 DIAGNOSIS — Z01.812 PRE-PROCEDURE LAB EXAM: Primary | ICD-10-CM

## 2020-10-29 PROBLEM — M17.11 ARTHRITIS OF KNEE, RIGHT: Chronic | Status: ACTIVE | Noted: 2020-10-29

## 2020-10-29 PROCEDURE — 87635 SARS-COV-2 COVID-19 AMP PRB: CPT

## 2020-10-29 NOTE — H&P
Progress Notes         []Hide copied text    []Myrna for details  PCP: Lorraine Esposito NP      Problem List      None        Subjective: There is no problem list on file for this patient.     Chief Complaint: Post-op of the Left Knee     HPI: Cristopher Genao is a 78 y.o. male who returns for follow-up of his left total knee replacement on 08/03/20. He is around 6 weeks post-op. The x-ray of the left knee ordered on 08/21/20 shows satisfactory prosthetic fit, fixation, and overall limb alignment. There is no sign of complication or loosening. He notes his right knee is doing well at this point in time. He reports that he has occasional right knee swelling. He states he has been walking and cycling for exercise. He inquires about getting a right total knee replacement. He is ambulating without assistance today.     Objective:      There were no vitals filed for this visit. Height: 6'       Wt Readings from Last 3 Encounters:   09/18/20 233 lb   08/21/20 233 lb   07/10/20 233 lb      Body mass index is 31.6 kg/m².     Musculoskeletal : Left Knee: Left knee incision is well-healed, c/d/i. No evidence for infection or DVT. No cellulitis. Normal ROM of bilateral hips with no pain on motion. ROM left knee 0-120 degrees. No effusion. Leg lengths appear equal. Strong pedal pulses. No pedal edema. No apparent atrophy. Skin healthy and intact. Neurovascularly intact.      Radiographs:             No imaging obtained    Assessment:      1. Aftercare following left knee joint replacement surgery    2. Primary osteoarthritis of right knee       Plan:      I reassured the patient that his left knee is healing well and should continue to do so. I also explained to the patient that his left knee will swell and ache for 3 months. I explained that we can proceed with a right knee replacement 3 months after the left knee replacement, so we are looking at 11/2020. He may advance activities as tolerated as using pain as a guide. I advised the patient to let us know if he develops any new or worsening symptoms. Follow-up on a PRN basis.          Orders Placed This Encounter    BMI >=25 PATIENT INSTRUCTIONS & EDUCATION      Return if symptoms worsen or fail to improve.      Medical documentation was entered by me, Baron Borrero, Medical Scribe for Dr. Yamilet Negron. 9/18/2020     I, Mono Toro MD, personally, performed the services described in this documentation, as scribed in my presence, and it is both accurate and complete. Electronically signed by oMno Toro MD at 9/18/2020 12:37 PM      Links    Date of Surgery Update:  Azul Mcgrath was seen and examined. History and physical has been reviewed. The patient has been examined. There have been no significant clinical changes since the completion of the originally dated History and Physical.  Patient identified by surgeon; surgical site was confirmed by patient and surgeon.   The patient wishes to proceed with planned procedure on right knee    Signed By: CHELE Hernandez     November 2, 2020 7:14 AM             Previous Version     Office Visit on 9/18/2020        Revision History        Note shared with patient

## 2020-10-31 ENCOUNTER — ANESTHESIA EVENT (OUTPATIENT)
Dept: SURGERY | Age: 70
End: 2020-10-31
Payer: MEDICARE

## 2020-10-31 LAB — SARS-COV-2, COV2NT: NOT DETECTED

## 2020-11-02 ENCOUNTER — ANESTHESIA (OUTPATIENT)
Dept: SURGERY | Age: 70
End: 2020-11-02
Payer: MEDICARE

## 2020-11-02 ENCOUNTER — HOSPITAL ENCOUNTER (OUTPATIENT)
Age: 70
Setting detail: OBSERVATION
Discharge: HOME HEALTH | End: 2020-11-03
Attending: ORTHOPAEDIC SURGERY | Admitting: ORTHOPAEDIC SURGERY
Payer: MEDICARE

## 2020-11-02 DIAGNOSIS — Z96.651 STATUS POST TOTAL RIGHT KNEE REPLACEMENT: ICD-10-CM

## 2020-11-02 DIAGNOSIS — M17.11 ARTHRITIS OF KNEE, RIGHT: Primary | Chronic | ICD-10-CM

## 2020-11-02 LAB
GLUCOSE BLD STRIP.AUTO-MCNC: 109 MG/DL (ref 65–100)
GLUCOSE BLD STRIP.AUTO-MCNC: 115 MG/DL (ref 65–100)
GLUCOSE BLD STRIP.AUTO-MCNC: 139 MG/DL (ref 65–100)
GLUCOSE BLD STRIP.AUTO-MCNC: 218 MG/DL (ref 65–100)
SERVICE CMNT-IMP: ABNORMAL

## 2020-11-02 PROCEDURE — 74011250636 HC RX REV CODE- 250/636: Performed by: ANESTHESIOLOGY

## 2020-11-02 PROCEDURE — 74011250636 HC RX REV CODE- 250/636: Performed by: NURSE ANESTHETIST, CERTIFIED REGISTERED

## 2020-11-02 PROCEDURE — 99218 HC RM OBSERVATION: CPT

## 2020-11-02 PROCEDURE — 97530 THERAPEUTIC ACTIVITIES: CPT

## 2020-11-02 PROCEDURE — 76210000017 HC OR PH I REC 1.5 TO 2 HR: Performed by: ORTHOPAEDIC SURGERY

## 2020-11-02 PROCEDURE — 2709999900 HC NON-CHARGEABLE SUPPLY

## 2020-11-02 PROCEDURE — 97116 GAIT TRAINING THERAPY: CPT

## 2020-11-02 PROCEDURE — 97161 PT EVAL LOW COMPLEX 20 MIN: CPT

## 2020-11-02 PROCEDURE — 74011636637 HC RX REV CODE- 636/637: Performed by: PHYSICIAN ASSISTANT

## 2020-11-02 PROCEDURE — 74011000250 HC RX REV CODE- 250: Performed by: PHYSICIAN ASSISTANT

## 2020-11-02 PROCEDURE — 77030014077 HC TOWER MX CEM J&J -C: Performed by: ORTHOPAEDIC SURGERY

## 2020-11-02 PROCEDURE — 74011250637 HC RX REV CODE- 250/637: Performed by: PHYSICIAN ASSISTANT

## 2020-11-02 PROCEDURE — 77030003601 HC NDL NRV BLK BBMI -A

## 2020-11-02 PROCEDURE — 77030005513 HC CATH URETH FOL11 MDII -B: Performed by: ORTHOPAEDIC SURGERY

## 2020-11-02 PROCEDURE — 74011250636 HC RX REV CODE- 250/636: Performed by: ORTHOPAEDIC SURGERY

## 2020-11-02 PROCEDURE — 77030014125 HC TY EPDRL BBMI -B: Performed by: ANESTHESIOLOGY

## 2020-11-02 PROCEDURE — 77030040922 HC BLNKT HYPOTHRM STRY -A

## 2020-11-02 PROCEDURE — C1713 ANCHOR/SCREW BN/BN,TIS/BN: HCPCS | Performed by: ORTHOPAEDIC SURGERY

## 2020-11-02 PROCEDURE — 74011000250 HC RX REV CODE- 250: Performed by: ORTHOPAEDIC SURGERY

## 2020-11-02 PROCEDURE — C1776 JOINT DEVICE (IMPLANTABLE): HCPCS | Performed by: ORTHOPAEDIC SURGERY

## 2020-11-02 PROCEDURE — 76060000035 HC ANESTHESIA 2 TO 2.5 HR: Performed by: ORTHOPAEDIC SURGERY

## 2020-11-02 PROCEDURE — 77030027138 HC INCENT SPIROMETER -A

## 2020-11-02 PROCEDURE — 74011250637 HC RX REV CODE- 250/637: Performed by: ORTHOPAEDIC SURGERY

## 2020-11-02 PROCEDURE — 77030035236 HC SUT PDS STRATFX BARB J&J -B: Performed by: ORTHOPAEDIC SURGERY

## 2020-11-02 PROCEDURE — 77030031139 HC SUT VCRL2 J&J -A: Performed by: ORTHOPAEDIC SURGERY

## 2020-11-02 PROCEDURE — 77030028907 HC WRP KNEE WO BGS SOLM -B

## 2020-11-02 PROCEDURE — 77030006822 HC BLD SAW SAG BRSM -B: Performed by: ORTHOPAEDIC SURGERY

## 2020-11-02 PROCEDURE — 2709999900 HC NON-CHARGEABLE SUPPLY: Performed by: ORTHOPAEDIC SURGERY

## 2020-11-02 PROCEDURE — 82962 GLUCOSE BLOOD TEST: CPT

## 2020-11-02 PROCEDURE — 74011000250 HC RX REV CODE- 250: Performed by: ANESTHESIOLOGY

## 2020-11-02 PROCEDURE — 77030000032 HC CUF TRNQT ZIMM -B: Performed by: ORTHOPAEDIC SURGERY

## 2020-11-02 PROCEDURE — 76010000171 HC OR TIME 2 TO 2.5 HR INTENSV-TIER 1: Performed by: ORTHOPAEDIC SURGERY

## 2020-11-02 PROCEDURE — 77030008462 HC STPLR SKN PROX J&J -A: Performed by: ORTHOPAEDIC SURGERY

## 2020-11-02 PROCEDURE — 74011250636 HC RX REV CODE- 250/636: Performed by: PHYSICIAN ASSISTANT

## 2020-11-02 PROCEDURE — 77030018836 HC SOL IRR NACL ICUM -A: Performed by: ORTHOPAEDIC SURGERY

## 2020-11-02 DEVICE — KNEE K1 TOT HEMI STD CEM IMPL CAPPED K1 ZIM: Type: IMPLANTABLE DEVICE | Site: KNEE | Status: FUNCTIONAL

## 2020-11-02 DEVICE — IMPLANTABLE DEVICE: Type: IMPLANTABLE DEVICE | Site: KNEE | Status: FUNCTIONAL

## 2020-11-02 DEVICE — IMPLANTABLE DEVICE
Type: IMPLANTABLE DEVICE | Site: KNEE | Status: FUNCTIONAL
Brand: PERSONA® VIVACIT-E®

## 2020-11-02 DEVICE — IMPLANTABLE DEVICE
Type: IMPLANTABLE DEVICE | Site: KNEE | Status: FUNCTIONAL
Brand: PERSONA® NATURAL TIBIA®

## 2020-11-02 RX ORDER — OXYCODONE HYDROCHLORIDE 5 MG/1
5 TABLET ORAL
Status: DISCONTINUED | OUTPATIENT
Start: 2020-11-02 | End: 2020-11-03 | Stop reason: HOSPADM

## 2020-11-02 RX ORDER — CARVEDILOL 12.5 MG/1
12.5 TABLET ORAL 2 TIMES DAILY WITH MEALS
Status: DISCONTINUED | OUTPATIENT
Start: 2020-11-02 | End: 2020-11-03 | Stop reason: HOSPADM

## 2020-11-02 RX ORDER — AMOXICILLIN 250 MG
1 CAPSULE ORAL 2 TIMES DAILY
Status: DISCONTINUED | OUTPATIENT
Start: 2020-11-02 | End: 2020-11-03 | Stop reason: HOSPADM

## 2020-11-02 RX ORDER — ACETAMINOPHEN 325 MG/1
650 TABLET ORAL EVERY 6 HOURS
Status: DISCONTINUED | OUTPATIENT
Start: 2020-11-02 | End: 2020-11-03 | Stop reason: HOSPADM

## 2020-11-02 RX ORDER — MIDAZOLAM HYDROCHLORIDE 1 MG/ML
INJECTION, SOLUTION INTRAMUSCULAR; INTRAVENOUS AS NEEDED
Status: DISCONTINUED | OUTPATIENT
Start: 2020-11-02 | End: 2020-11-02 | Stop reason: HOSPADM

## 2020-11-02 RX ORDER — SODIUM CHLORIDE 0.9 % (FLUSH) 0.9 %
5-40 SYRINGE (ML) INJECTION EVERY 8 HOURS
Status: DISCONTINUED | OUTPATIENT
Start: 2020-11-02 | End: 2020-11-03 | Stop reason: HOSPADM

## 2020-11-02 RX ORDER — SODIUM CHLORIDE 0.9 % (FLUSH) 0.9 %
5-40 SYRINGE (ML) INJECTION AS NEEDED
Status: DISCONTINUED | OUTPATIENT
Start: 2020-11-02 | End: 2020-11-02 | Stop reason: HOSPADM

## 2020-11-02 RX ORDER — SODIUM CHLORIDE 9 MG/ML
125 INJECTION, SOLUTION INTRAVENOUS CONTINUOUS
Status: DISCONTINUED | OUTPATIENT
Start: 2020-11-02 | End: 2020-11-03 | Stop reason: HOSPADM

## 2020-11-02 RX ORDER — SODIUM CHLORIDE 0.9 % (FLUSH) 0.9 %
5-40 SYRINGE (ML) INJECTION EVERY 8 HOURS
Status: DISCONTINUED | OUTPATIENT
Start: 2020-11-02 | End: 2020-11-02 | Stop reason: HOSPADM

## 2020-11-02 RX ORDER — ONDANSETRON 2 MG/ML
4 INJECTION INTRAMUSCULAR; INTRAVENOUS AS NEEDED
Status: DISCONTINUED | OUTPATIENT
Start: 2020-11-02 | End: 2020-11-02 | Stop reason: HOSPADM

## 2020-11-02 RX ORDER — OXYCODONE HYDROCHLORIDE 5 MG/1
5 TABLET ORAL
Qty: 20 TAB | Refills: 0 | Status: SHIPPED | OUTPATIENT
Start: 2020-11-02 | End: 2020-11-09

## 2020-11-02 RX ORDER — FENTANYL CITRATE 50 UG/ML
50 INJECTION, SOLUTION INTRAMUSCULAR; INTRAVENOUS AS NEEDED
Status: DISCONTINUED | OUTPATIENT
Start: 2020-11-02 | End: 2020-11-02 | Stop reason: HOSPADM

## 2020-11-02 RX ORDER — HYDROXYZINE HYDROCHLORIDE 10 MG/1
10 TABLET, FILM COATED ORAL
Status: DISCONTINUED | OUTPATIENT
Start: 2020-11-02 | End: 2020-11-03 | Stop reason: HOSPADM

## 2020-11-02 RX ORDER — PREGABALIN 75 MG/1
75 CAPSULE ORAL ONCE
Status: COMPLETED | OUTPATIENT
Start: 2020-11-02 | End: 2020-11-02

## 2020-11-02 RX ORDER — AMLODIPINE BESYLATE 5 MG/1
10 TABLET ORAL
Status: DISCONTINUED | OUTPATIENT
Start: 2020-11-03 | End: 2020-11-03 | Stop reason: HOSPADM

## 2020-11-02 RX ORDER — MIDAZOLAM HYDROCHLORIDE 1 MG/ML
1 INJECTION, SOLUTION INTRAMUSCULAR; INTRAVENOUS AS NEEDED
Status: DISCONTINUED | OUTPATIENT
Start: 2020-11-02 | End: 2020-11-02 | Stop reason: HOSPADM

## 2020-11-02 RX ORDER — MORPHINE SULFATE 10 MG/ML
2 INJECTION, SOLUTION INTRAMUSCULAR; INTRAVENOUS
Status: DISCONTINUED | OUTPATIENT
Start: 2020-11-02 | End: 2020-11-02 | Stop reason: HOSPADM

## 2020-11-02 RX ORDER — AMOXICILLIN 250 MG
1 CAPSULE ORAL 2 TIMES DAILY
Qty: 30 TAB | Refills: 0 | Status: SHIPPED | OUTPATIENT
Start: 2020-11-02

## 2020-11-02 RX ORDER — ONDANSETRON 2 MG/ML
4 INJECTION INTRAMUSCULAR; INTRAVENOUS
Status: DISCONTINUED | OUTPATIENT
Start: 2020-11-02 | End: 2020-11-03 | Stop reason: HOSPADM

## 2020-11-02 RX ORDER — OXYCODONE HYDROCHLORIDE 5 MG/1
10 TABLET ORAL
Status: DISCONTINUED | OUTPATIENT
Start: 2020-11-02 | End: 2020-11-03 | Stop reason: HOSPADM

## 2020-11-02 RX ORDER — SODIUM CHLORIDE 0.9 % (FLUSH) 0.9 %
5-40 SYRINGE (ML) INJECTION AS NEEDED
Status: DISCONTINUED | OUTPATIENT
Start: 2020-11-02 | End: 2020-11-03 | Stop reason: HOSPADM

## 2020-11-02 RX ORDER — NALOXONE HYDROCHLORIDE 0.4 MG/ML
0.4 INJECTION, SOLUTION INTRAMUSCULAR; INTRAVENOUS; SUBCUTANEOUS AS NEEDED
Status: DISCONTINUED | OUTPATIENT
Start: 2020-11-02 | End: 2020-11-03 | Stop reason: HOSPADM

## 2020-11-02 RX ORDER — MIDAZOLAM HYDROCHLORIDE 1 MG/ML
0.5 INJECTION, SOLUTION INTRAMUSCULAR; INTRAVENOUS
Status: DISCONTINUED | OUTPATIENT
Start: 2020-11-02 | End: 2020-11-02 | Stop reason: HOSPADM

## 2020-11-02 RX ORDER — SODIUM CHLORIDE 9 MG/ML
50 INJECTION, SOLUTION INTRAVENOUS CONTINUOUS
Status: DISCONTINUED | OUTPATIENT
Start: 2020-11-02 | End: 2020-11-02 | Stop reason: HOSPADM

## 2020-11-02 RX ORDER — BUPIVACAINE HYDROCHLORIDE 5 MG/ML
INJECTION, SOLUTION EPIDURAL; INTRACAUDAL
Status: COMPLETED | OUTPATIENT
Start: 2020-11-02 | End: 2020-11-02

## 2020-11-02 RX ORDER — LIDOCAINE HYDROCHLORIDE 10 MG/ML
0.1 INJECTION, SOLUTION EPIDURAL; INFILTRATION; INTRACAUDAL; PERINEURAL AS NEEDED
Status: DISCONTINUED | OUTPATIENT
Start: 2020-11-02 | End: 2020-11-02 | Stop reason: HOSPADM

## 2020-11-02 RX ORDER — HYDROCODONE BITARTRATE AND ACETAMINOPHEN 5; 325 MG/1; MG/1
1 TABLET ORAL AS NEEDED
Status: DISCONTINUED | OUTPATIENT
Start: 2020-11-02 | End: 2020-11-02 | Stop reason: HOSPADM

## 2020-11-02 RX ORDER — MAGNESIUM SULFATE 100 %
4 CRYSTALS MISCELLANEOUS AS NEEDED
Status: DISCONTINUED | OUTPATIENT
Start: 2020-11-02 | End: 2020-11-03 | Stop reason: HOSPADM

## 2020-11-02 RX ORDER — ASPIRIN 81 MG/1
81 TABLET ORAL 2 TIMES DAILY
Status: DISCONTINUED | OUTPATIENT
Start: 2020-11-02 | End: 2020-11-03 | Stop reason: HOSPADM

## 2020-11-02 RX ORDER — CEFAZOLIN SODIUM/WATER 2 G/20 ML
2 SYRINGE (ML) INTRAVENOUS ONCE
Status: COMPLETED | OUTPATIENT
Start: 2020-11-02 | End: 2020-11-02

## 2020-11-02 RX ORDER — SODIUM CHLORIDE, SODIUM LACTATE, POTASSIUM CHLORIDE, CALCIUM CHLORIDE 600; 310; 30; 20 MG/100ML; MG/100ML; MG/100ML; MG/100ML
125 INJECTION, SOLUTION INTRAVENOUS CONTINUOUS
Status: DISCONTINUED | OUTPATIENT
Start: 2020-11-02 | End: 2020-11-02 | Stop reason: HOSPADM

## 2020-11-02 RX ORDER — ROPIVACAINE HYDROCHLORIDE 5 MG/ML
INJECTION, SOLUTION EPIDURAL; INFILTRATION; PERINEURAL
Status: COMPLETED | OUTPATIENT
Start: 2020-11-02 | End: 2020-11-02

## 2020-11-02 RX ORDER — CELECOXIB 200 MG/1
200 CAPSULE ORAL ONCE
Status: COMPLETED | OUTPATIENT
Start: 2020-11-02 | End: 2020-11-02

## 2020-11-02 RX ORDER — SODIUM CHLORIDE 9 MG/ML
25 INJECTION, SOLUTION INTRAVENOUS CONTINUOUS
Status: DISCONTINUED | OUTPATIENT
Start: 2020-11-02 | End: 2020-11-02 | Stop reason: HOSPADM

## 2020-11-02 RX ORDER — PROPOFOL 10 MG/ML
INJECTION, EMULSION INTRAVENOUS AS NEEDED
Status: DISCONTINUED | OUTPATIENT
Start: 2020-11-02 | End: 2020-11-02 | Stop reason: HOSPADM

## 2020-11-02 RX ORDER — HYDROMORPHONE HYDROCHLORIDE 1 MG/ML
0.2 INJECTION, SOLUTION INTRAMUSCULAR; INTRAVENOUS; SUBCUTANEOUS
Status: DISCONTINUED | OUTPATIENT
Start: 2020-11-02 | End: 2020-11-02 | Stop reason: HOSPADM

## 2020-11-02 RX ORDER — INSULIN GLARGINE 100 [IU]/ML
0.1 INJECTION, SOLUTION SUBCUTANEOUS
Status: DISCONTINUED | OUTPATIENT
Start: 2020-11-02 | End: 2020-11-03 | Stop reason: HOSPADM

## 2020-11-02 RX ORDER — POLYETHYLENE GLYCOL 3350 17 G/17G
17 POWDER, FOR SOLUTION ORAL DAILY
Status: DISCONTINUED | OUTPATIENT
Start: 2020-11-03 | End: 2020-11-03 | Stop reason: HOSPADM

## 2020-11-02 RX ORDER — FACIAL-BODY WIPES
10 EACH TOPICAL DAILY PRN
Status: DISCONTINUED | OUTPATIENT
Start: 2020-11-04 | End: 2020-11-03 | Stop reason: HOSPADM

## 2020-11-02 RX ORDER — TRANEXAMIC ACID 100 MG/ML
INJECTION, SOLUTION INTRAVENOUS AS NEEDED
Status: DISCONTINUED | OUTPATIENT
Start: 2020-11-02 | End: 2020-11-02 | Stop reason: HOSPADM

## 2020-11-02 RX ORDER — SODIUM CHLORIDE, SODIUM LACTATE, POTASSIUM CHLORIDE, CALCIUM CHLORIDE 600; 310; 30; 20 MG/100ML; MG/100ML; MG/100ML; MG/100ML
75 INJECTION, SOLUTION INTRAVENOUS CONTINUOUS
Status: DISCONTINUED | OUTPATIENT
Start: 2020-11-02 | End: 2020-11-02 | Stop reason: HOSPADM

## 2020-11-02 RX ORDER — CEFAZOLIN SODIUM/WATER 2 G/20 ML
2 SYRINGE (ML) INTRAVENOUS EVERY 8 HOURS
Status: COMPLETED | OUTPATIENT
Start: 2020-11-02 | End: 2020-11-03

## 2020-11-02 RX ORDER — FENTANYL CITRATE 50 UG/ML
25 INJECTION, SOLUTION INTRAMUSCULAR; INTRAVENOUS
Status: DISCONTINUED | OUTPATIENT
Start: 2020-11-02 | End: 2020-11-02 | Stop reason: HOSPADM

## 2020-11-02 RX ORDER — HYDROMORPHONE HYDROCHLORIDE 1 MG/ML
0.5 INJECTION, SOLUTION INTRAMUSCULAR; INTRAVENOUS; SUBCUTANEOUS
Status: DISCONTINUED | OUTPATIENT
Start: 2020-11-02 | End: 2020-11-03 | Stop reason: HOSPADM

## 2020-11-02 RX ORDER — ACETAMINOPHEN 500 MG
1000 TABLET ORAL ONCE
Status: COMPLETED | OUTPATIENT
Start: 2020-11-02 | End: 2020-11-02

## 2020-11-02 RX ORDER — DIPHENHYDRAMINE HYDROCHLORIDE 50 MG/ML
12.5 INJECTION, SOLUTION INTRAMUSCULAR; INTRAVENOUS AS NEEDED
Status: DISCONTINUED | OUTPATIENT
Start: 2020-11-02 | End: 2020-11-02 | Stop reason: HOSPADM

## 2020-11-02 RX ORDER — PROPOFOL 10 MG/ML
INJECTION, EMULSION INTRAVENOUS
Status: DISCONTINUED | OUTPATIENT
Start: 2020-11-02 | End: 2020-11-02 | Stop reason: HOSPADM

## 2020-11-02 RX ORDER — DEXTROSE MONOHYDRATE 100 MG/ML
0-250 INJECTION, SOLUTION INTRAVENOUS AS NEEDED
Status: DISCONTINUED | OUTPATIENT
Start: 2020-11-02 | End: 2020-11-03 | Stop reason: HOSPADM

## 2020-11-02 RX ORDER — INSULIN LISPRO 100 [IU]/ML
INJECTION, SOLUTION INTRAVENOUS; SUBCUTANEOUS
Status: DISCONTINUED | OUTPATIENT
Start: 2020-11-02 | End: 2020-11-03 | Stop reason: HOSPADM

## 2020-11-02 RX ORDER — ATORVASTATIN CALCIUM 40 MG/1
40 TABLET, FILM COATED ORAL
Status: DISCONTINUED | OUTPATIENT
Start: 2020-11-02 | End: 2020-11-03 | Stop reason: HOSPADM

## 2020-11-02 RX ORDER — LISINOPRIL 20 MG/1
40 TABLET ORAL
Status: DISCONTINUED | OUTPATIENT
Start: 2020-11-03 | End: 2020-11-03 | Stop reason: HOSPADM

## 2020-11-02 RX ADMIN — ACETAMINOPHEN 650 MG: 325 TABLET ORAL at 17:52

## 2020-11-02 RX ADMIN — ASPIRIN 81 MG: 81 TABLET, COATED ORAL at 17:53

## 2020-11-02 RX ADMIN — CEFAZOLIN SODIUM 2 G: 300 INJECTION, POWDER, LYOPHILIZED, FOR SOLUTION INTRAVENOUS at 18:35

## 2020-11-02 RX ADMIN — ACETAMINOPHEN 650 MG: 325 TABLET ORAL at 23:16

## 2020-11-02 RX ADMIN — INSULIN GLARGINE 10 UNITS: 100 INJECTION, SOLUTION SUBCUTANEOUS at 22:10

## 2020-11-02 RX ADMIN — CARVEDILOL 12.5 MG: 12.5 TABLET, FILM COATED ORAL at 17:53

## 2020-11-02 RX ADMIN — PROPOFOL 40 MCG/KG/MIN: 10 INJECTION, EMULSION INTRAVENOUS at 10:39

## 2020-11-02 RX ADMIN — SODIUM CHLORIDE, SODIUM LACTATE, POTASSIUM CHLORIDE, AND CALCIUM CHLORIDE 125 ML/HR: 600; 310; 30; 20 INJECTION, SOLUTION INTRAVENOUS at 10:12

## 2020-11-02 RX ADMIN — SODIUM CHLORIDE 125 ML/HR: 900 INJECTION, SOLUTION INTRAVENOUS at 17:52

## 2020-11-02 RX ADMIN — PREGABALIN 75 MG: 75 CAPSULE ORAL at 09:36

## 2020-11-02 RX ADMIN — FENTANYL CITRATE 50 MCG: 50 INJECTION, SOLUTION INTRAMUSCULAR; INTRAVENOUS at 10:05

## 2020-11-02 RX ADMIN — Medication 2 G: at 10:56

## 2020-11-02 RX ADMIN — ACETAMINOPHEN 1000 MG: 500 TABLET ORAL at 09:36

## 2020-11-02 RX ADMIN — PROPOFOL 40 MG: 10 INJECTION, EMULSION INTRAVENOUS at 10:39

## 2020-11-02 RX ADMIN — MIDAZOLAM 1 MG: 1 INJECTION INTRAMUSCULAR; INTRAVENOUS at 10:27

## 2020-11-02 RX ADMIN — DOCUSATE SODIUM 50MG AND SENNOSIDES 8.6MG 1 TABLET: 8.6; 5 TABLET, FILM COATED ORAL at 17:52

## 2020-11-02 RX ADMIN — INSULIN LISPRO 2 UNITS: 100 INJECTION, SOLUTION INTRAVENOUS; SUBCUTANEOUS at 22:11

## 2020-11-02 RX ADMIN — MIDAZOLAM 2 MG: 1 INJECTION INTRAMUSCULAR; INTRAVENOUS at 10:05

## 2020-11-02 RX ADMIN — ATORVASTATIN CALCIUM 40 MG: 40 TABLET, FILM COATED ORAL at 22:11

## 2020-11-02 RX ADMIN — CELECOXIB 200 MG: 200 CAPSULE ORAL at 09:36

## 2020-11-02 RX ADMIN — MIDAZOLAM 1 MG: 1 INJECTION INTRAMUSCULAR; INTRAVENOUS at 10:26

## 2020-11-02 RX ADMIN — BUPIVACAINE HYDROCHLORIDE 10 MG: 5 INJECTION, SOLUTION EPIDURAL; INTRACAUDAL; PERINEURAL at 10:49

## 2020-11-02 RX ADMIN — SODIUM CHLORIDE 125 ML/HR: 900 INJECTION, SOLUTION INTRAVENOUS at 20:38

## 2020-11-02 RX ADMIN — ROPIVACAINE HYDROCHLORIDE 30 ML: 5 INJECTION, SOLUTION EPIDURAL; INFILTRATION; PERINEURAL at 10:09

## 2020-11-02 RX ADMIN — TICAGRELOR 90 MG: 90 TABLET ORAL at 17:52

## 2020-11-02 NOTE — ROUTINE PROCESS
Occupational Therapy Orders received, chart review completed. Note patient POD #0 from RIGHT TOTAL KNEE REPLACEMENT. OT will follow up tomorrow for evaluation. Recommend OOB to chair three times a day for meals, self-completion of ADLs as able and medically stable. Thank you.

## 2020-11-02 NOTE — DISCHARGE INSTRUCTIONS
Discharge Instructions Knee Replacement  Dr. Lesly Rodríguez      Patient Name  Balta Flowers  Date of procedure  11/2/2020    Procedure  Procedure(s):  RIGHT TOTAL KNEE REPLACEMENT  Surgeon  Surgeon(s) and Role:     Shannon Yan MD - Primary  Date of discharge: 11/3/2020  PCP: Edson Salazar NP    Follow up care   Follow up visit with Dr. Lesly Rodríguez in 2 weeks. Call [phone] to make an appointment   The staples in your knee incision will be taken out at this follow up appointment    Activity at home   Take a short walk every hour; except at night when sleeping   Do your Home Exercise Program 3 times every day    After exercising lie down and elevate your leg on pillows for 15-30 minutes to decrease swelling   Refer to your patient notebook for more information  Bathing and caring for your incision   You may take a shower and wash your incision with soap and water   Change your knee dressing daily for one week. You may then leave your incision open to air unless you see drainage from your knee. Preventing blood clots   Take Brillinta and aspirin every day as prescribed   Call Dr. Lesly Rodríguez if you have side effects of blood thinning medication: bleeding, bruising, upset stomach, or diarrhea.  Call Dr. Lesly Rodríguez for signs of a blood clot in your leg: calf pain, tenderness, redness, swelling of lower leg   Preventing lung congestion   Use your incentive spirometer 4 times a day; do 10 repetitions each time   Remember to keep the small blue ball between the two arrows when taking a slow, deep breath   Pain Management   Get up and walk a short distance to relieve pain and stiffness.  Place ice wrap on your knee except when you are walking. The gel ice packs should be changed about every 4 hours   Elevate your leg on pillows for 15-30 minutes    Take Tylenol 1000mg (take two 500mg tablets) every 6 hours for the next 2 weeks   Take anti-inflammatory medication (NSAID) as prescribed each day.    If needed, take a narcotic pain pill every 4-6 hours as prescribed. Take with a small amount of food.  As your pain decreases, take the narcotics less often or take ½ of a pill   Call Dr. Lesly Rodríguez if you have side effects from your narcotic pain medication: itching, drowsiness, dizziness, upset stomach, dry mouth, constipation or if you medication is not relieving your pain. Diet after surgery   You may resume your normal diet. Include vegetables, fruit, whole grains, lean meats, and low-fat dairy products. Eat food high in fiber    Drink plenty of fluids, including 8 cups of water daily   Take over-the-counter stool softeners and laxatives to prevent constipation    Avoid after surgery   Do not take any over-the-counter medication for pain except Tylenol     Do not take more than 4000 mg (4 Grams) of Tylenol in 24 hours     Do not drink alcoholic beverages   Do not smoke   Do not drive until seen for follow up appointment   Do not place frozen gel pack directly on your skin. It can cause frostbite.  Do not take a tub bath, swim or get in a hot tub for 6 weeks  Prevention of falls and safety at home   Set up an area where you can rest comfortably leaving space around furniture to allow you to walk with your walker   Keep stairs, hallways and bathrooms well lit; especially at night   Arrange for care for your pets   Keep your home free of clutter   Call Dr. Lesly Rodríguez at [phone] for:   Pain that is not relieved by pain medication, ice and activity   Side effects of medications   Increased/spread of bruising   Warning signs of infection:  ? persistent fever greater than 100 degrees  ?  shaking or chills  ? increased redness, tenderness, swelling or drainage from incision  ? increased pain during activity or rest   Warning signs of a blood clot in your leg:  ? increased pain in your calf  ? tenderness or redness  ? increased swelling or knee, calf, ankle or foot    If you call after 5pm or on a weekend, the on call physician will return your phone call  Call your Primary Care Doctor for:    Concerns about your medical conditions such as diabetes, high blood pressure, asthma, congestive heart failure.    Blood sugars greater than 180   Persistent headache or dizziness   Coughing or congestion   Constipation or diarrhea   Burning when you go to the bathroom   Abnormal heart rate (fast or slow)               Call 911 and go to the nearest hospital for:    Sudden increased shortness of breath   Sudden onset of chest pain   Difficulty breathing   Localized chest pain with coughing or taking a deep breath

## 2020-11-02 NOTE — ANESTHESIA POSTPROCEDURE EVALUATION
Procedure(s):  RIGHT TOTAL KNEE REPLACEMENT.    spinal    Anesthesia Post Evaluation        Patient location during evaluation: PACU  Patient participation: complete - patient participated  Level of consciousness: awake  Pain management: adequate  Airway patency: patent  Anesthetic complications: no  Cardiovascular status: hemodynamically stable  Respiratory status: acceptable  Hydration status: acceptable  Comments: I have seen and evaluated the patient. The patient is ready for PACU discharge. Jessica Baca, DO                         INITIAL Post-op Vital signs:   Vitals Value Taken Time   /67 11/2/2020  1:15 PM   Temp 36.4 °C (97.6 °F) 11/2/2020 12:50 PM   Pulse 51 11/2/2020  1:24 PM   Resp 12 11/2/2020  1:24 PM   SpO2 99 % 11/2/2020  1:24 PM   Vitals shown include unvalidated device data.

## 2020-11-02 NOTE — PROGRESS NOTES
Problem: Mobility Impaired (Adult and Pediatric)  Goal: *Acute Goals and Plan of Care (Insert Text)  Description: FUNCTIONAL STATUS PRIOR TO ADMISSION: Patient was independent and active without use of DME.    HOME SUPPORT PRIOR TO ADMISSION: The patient lived alone with brothers and friends to provide assistance. Physical Therapy Goals  Initiated 11/2/2020    1. Patient will move from supine to sit and sit to supine , scoot up and down, and roll side to side in bed with modified independence within 4 days. 2. Patient will perform sit to stand with modified independence within 4 days. 3. Patient will ambulate with modified independence for 150 feet with the least restrictive device within 4 days. 4. Patient will ascend/descend 4 stairs with cane and one handrail(s) with modified independence within 4 days. 5. Patient will perform home exercise program per protocol with independence within 4 days. 6. Patient will demonstrate AROM 0-90 degrees in operative joint within 4 days. Outcome: Progressing Towards Goal   PHYSICAL THERAPY EVALUATION  Patient: Maria Esther Russell (77 y.o. male)  Date: 11/2/2020  Primary Diagnosis: Arthritis of knee, right [M17.11]  Procedure(s) (LRB):  RIGHT TOTAL KNEE REPLACEMENT (Right) Day of Surgery   Precautions:   Fall, WBAT    ASSESSMENT  Based on the objective data described below, the patient presents with  impairment in functional mobility, activity tolerance and balance s/pR TKA. PLOF: Independent with ADLs and IADLs. Lives alone in one story home, 4 steps with rail to enter. Has brothers and friends that can assist him as needed. Patient's mobility was on target for POD#0. Will address more exercises, increase gait distance, negotiate stairs and assess for discharge at am PT session tomorrow. Patient instructed NOT to get up from bed, chair or commode without calling for assistance. Initiated post-TKA exercise protocol.  Anticipate early discharge tomorrow after am PT session. Current Level of Function Impacting Discharge (mobility/balance): Contact guard assistance only. Ambulated 60 ft with RW and gait belt. Functional Outcome Measure: The patient scored 55/100 on the Barthel outcome measure which is indicative of moderate impaired ability to care for basic self-needs/dependency on others. .      Other factors to consider for discharge: Motivated/A & O x 4/Supportive Family/Independent PLOF     Patient will benefit from skilled therapy intervention to address the above noted impairments. PLAN :  Recommendations and Planned Interventions: bed mobility training, transfer training, gait training, therapeutic exercises, patient and family training/education and therapeutic activities      Frequency/Duration: Patient will be followed by physical therapy:  2 times a week to address goals. Recommendation for discharge: (in order for the patient to meet his/her long term goals)  Physical therapy at least 2 days/week in the home     This discharge recommendation:  Has been made in collaboration with the attending provider and/or case management    IF patient discharges home will need the following DME: patient owns DME required for discharge         SUBJECTIVE:   Patient stated I am doing okay.     OBJECTIVE DATA SUMMARY:   HISTORY:    Past Medical History:   Diagnosis Date    Arthritis     OSTEO-KNEES    CAD (coronary artery disease) 11/19/2019    X2 STENTS    Diabetes ([facility] [address]     Exposure to 2019 novel coronavirus 5/21/2020    High cholesterol     Hypertension      Past Surgical History:   Procedure Laterality Date    CARDIAC SURG PROCEDURE UNLIST  11/2019    placement of stent X2    HX CAROTID STENT      HX KNEE REPLACEMENT Left 08/03/2020    HX OTHER SURGICAL      oral surgery       Personal factors and/or comorbidities impacting plan of care:  Motivated/A & O x 4/Supportive Family/Independent PLOF    Home Situation  Home Environment: Trailer/mobile home  # Steps to Enter: 4  Rails to Enter: Yes  Hand Rails : Right  Wheelchair Ramp: No  One/Two Story Residence: One story  Living Alone: Yes  Support Systems: Family member(s)  Patient Expects to be Discharged to[de-identified] Trailer/mobile home  Current DME Used/Available at Home: [place] [employer], [employer], [employer]    EXAMINATION/PRESENTATION/DECISION MAKING:   Critical Behavior:  Neurologic State: Alert, Appropriate for age  Orientation Level: Oriented X4, Appropriate for age  Cognition: Appropriate for age attention/concentration, Appropriate decision making, Appropriate safety awareness, Follows commands     Hearing: Auditory  Auditory Impairment: None    Range Of Motion:  AROM: Generally decreased, functional           PROM: Generally decreased, functional           Strength:    Strength: Generally decreased, functional                    Tone & Sensation:   Tone: Normal              Sensation: Intact               Coordination:  Coordination: Within functional limits  Vision:      Functional Mobility:  Bed Mobility:  Rolling: Stand-by assistance  Supine to Sit: Contact guard assistance  Sit to Supine: Contact guard assistance  Scooting: Contact guard assistance  Transfers:  Sit to Stand: Contact guard assistance  Stand to Sit: Contact guard assistance                       Balance:   Sitting: Intact  Standing: Intact; With support  Ambulation/Gait Training:  Distance (ft): 65 Feet (ft)     Ambulation - Level of Assistance: Contact guard assistance        Gait Abnormalities: Antalgic  Right Side Weight Bearing: As tolerated     Base of Support: Widened;Shift to left  Stance: Right decreased  Speed/Keila: Slow  Step Length: Left shortened  Swing Pattern: Right asymmetrical             Therapeutic Exercises:    Ankle Pumps  Quad sets (5 second hold)  X 10 reps every hour   Heel slides x 10    Functional Measure:  Barthel Index:    Bathin  Bladder: 10  Bowels: 10  Groomin  Dressin  Feeding: 10  Mobility: 0  Stairs: 0  Toilet Use: 5  Transfer (Bed to Chair and Back): 10  Total: 55/100       The Barthel ADL Index: Guidelines  1. The index should be used as a record of what a patient does, not as a record of what a patient could do. 2. The main aim is to establish degree of independence from any help, physical or verbal, however minor and for whatever reason. 3. The need for supervision renders the patient not independent. 4. A patient's performance should be established using the best available evidence. Asking the patient, friends/relatives and nurses are the usual sources, but direct observation and common sense are also important. However direct testing is not needed. 5. Usually the patient's performance over the preceding 24-48 hours is important, but occasionally longer periods will be relevant. 6. Middle categories imply that the patient supplies over 50 per cent of the effort. 7. Use of aids to be independent is allowed. Marcello Raymond., Barthel, D.W. ([id number]). Functional evaluation: the Barthel Index. [facility] (14)2. Edu Newman balwinder REBECA Davis, Melissa Webber., Daylin Kamara., [place], [address] [place] [address] (1999). Measuring the change indisability after inpatient rehabilitation; comparison of the responsiveness of the Barthel Index and Functional [place] Measure. Journal of Neurology, Neurosurgery, and Psychiatry, 66(4), 859-542. LAUREN Presley, JUAN J Quintanilla, & Sameer Moss MMAISHA. (2004.) Assessment of post-stroke quality of life in cost-effectiveness studies: The usefulness of the Barthel Index and the EuroQoL-5D.  Quality of Life Research, 15, 779-49          Physical Therapy Evaluation Charge Determination   History Examination Presentation Decision-Making   LOW Complexity : Zero comorbidities / personal factors that will impact the outcome / POC LOW Complexity : 1-2 Standardized tests and measures addressing body structure, function, activity limitation and / or participation in recreation  LOW Complexity : Stable, uncomplicated  LOW Complexity : FOTO score of       Based on the above components, the patient evaluation is determined to be of the following complexity level: LOW     Pain Ratin/10    Activity Tolerance:   Good   Vitals:    20 1415 20 1456 20 1539 20 1642   BP: 130/61 138/78 (!) 149/78 (!) 159/74   BP 1 Location:       BP Patient Position:  At rest At rest At rest   Pulse: (!) 55 62 61 67   Resp: 14 16 16 16   Temp:  97.6 °F (36.4 °C) 97.8 °F (36.6 °C) 98 °F (36.7 °C)   SpO2: 100% 94% 94% 95%   Weight:       Height:           After treatment patient left in no apparent distress:   Supine in bed, Call bell within reach, Side rails x 3, and nurse notified. COMMUNICATION/EDUCATION:   The patients plan of care was discussed with: Registered nurse, Physician, and Case management. Fall prevention education was provided and the patient/caregiver indicated understanding., Patient/family have participated as able in goal setting and plan of care. , and Patient/family agree to work toward stated goals and plan of care.     Thank you for this referral.  Celia Quarles   Time Calculation: 30 mins

## 2020-11-02 NOTE — OP NOTES
11/2/2020  OPERATIVE REPORT      PREOPERATIVE DIAGNOSIS: Osteoarthritis, right knee. POSTOPERATIVE DIAGNOSIS: Osteoarthritis, right knee. OPERATIVE PROCEDURE: right total knee replacement. SURGEON: Erna Nissen, MD    ASSISTANT SURGEON: Adria Geller    ANESTHESIA: Spinal.    Antibiotic:Ancef    INDICATIONS: : A 69 y.o.  male  with end stage osteoarthritis to the right knee, not responsive to conservative management. COMPLICATIONS:None    PROCEDURE: The patient was taken to the operating room, underwent  placement of spinal anesthesia. The knee and leg were prepped and  draped in the usual fashion. The leg was exsanguinated with the Esmarch  bandage and tourniquet inflated to 350 mmHg. A longitudinal midline  incision made down through skin and subcutaneous tissue. A medial  parapatellar arthrotomy was performed, and extended proximally along the  medial border of the quadriceps tendon, distally along the medial border of  the insertion of the patella tendon. Medial release was performed. Retropatellar fat pad was sharply excised. The patella was subluxated  laterally, the knee was flexed to 90 degrees. Drill was used to enter the  intramedullary canal of the distal femur. The 5 degree intramedullary guide  was applied and the distal femoral cut was performed. The femur was sized  to a 11. A 11 cutting block was applied, and the anterior, posterior,  chamfer cuts were performed. The extramedullary tibial guide was applied, and the tibia was cut in  neutral alignment. The tibia was sized to a G. Knee was balanced to varus  and valgus stress. Flexion and extension gaps were equal. Trial reduction  was performed, using 10 mm insert. Excellent range of motion and stability  were noted. The patella was everted, and the patella was cut using freehand  technique. Patella was sized to a 38. Drill holes were placed, and patella  button applied. The patella tracked normally.  All trial components were  removed, and surfaces were prepared using drill and punch. All residual  meniscal tissue was sharply excised. Hemostasis was obtained using Bovie  apparatus. All components were cemented in place, taking care to remove all  excess cement. The knee was maintained in full extension while the cement  hardened. The tourniquet was let down after a total tourniquet time of 48  minutes. Hemostasis was obtained using the Bovie apparatus. The joint was  extensively irrigated with antibiotic solution. The arthrotomy was repaired  using #2 Vicryl in interrupted figure-of-eight fashion. Subcutaneous tissue  was approximated using 2-0 Vicryl in interrupted fashion. Skin edges were  approximated using stapling apparatus. Joint was infiltrated with 30 mL of  0.5% Marcaine with epinephrine. Bulky sterile dressing was applied, and the  patient was transferred to recovery room in stable condition. There were no  Complications. The Physician assistant was critical during the procedure in assisting with positioning ,retraction and wound closure. ESTIMATED BLOOD LOSS: 100 cc.     SPECIMEN: Arvin Gustafson M.D.  11/2/2020  12:21 PM

## 2020-11-02 NOTE — BRIEF OP NOTE
Brief Postoperative Note    Patient: Teresa Sánchez  YOB: 1950  MRN: [id number]    Date of Procedure: 11/2/2020     Pre-Op Diagnosis: OSTEOARTHRITIS RIGHT KNEE     Post-Op Diagnosis: Same as preoperative diagnosis. Procedure(s):  RIGHT TOTAL KNEE REPLACEMENT    Surgeon(s):  Slava Dickey MD    Surgical Assistant: Physician Assistant: CHELE Blackmon  Surg Asst-1: Humble Anon    Anesthesia: General     Estimated Blood Loss (mL): less than 948     Complications: None    Specimens: * No specimens in log *     Implants:   Implant Name Type Inv.  Item Serial No.  Lot No. LRB No. Used Action   Smartset GHV Gentamicin Bone Cement, 40g Cement  NA [id number] Right 2 Implanted   TIB PRN NP STM 5 DEG SZ GR -- PERSONA - SNA  TIB PRN NP STM 5 DEG SZ GR -- PERSONA NA [employer] [id number] Right 1 Implanted   COMPONENT PAT DDH23BS THK9.5MM STD VIVACIT-E KAYA INSET FOR - SNA  COMPONENT PAT QTC18TB THK9.5MM STD VIVACIT-E KAYA INSET FOR NA [employer] [id number] Right 1 Implanted   COMPONENT ARTC SURF MEDL ROHAN 8-11 GH 10 MM RT TIB FIX BEAR - SNA  COMPONENT ARTC SURF MEDL ROHAN 8-11 GH 10 MM RT TIB FIX BEAR NA [employer] [id number] Right 1 Implanted   COMPONENT FEM SZ 11 STD R KNEE CO CHROME CRUCE RET KAYA PERSO - SNA  COMPONENT FEM SZ 11 STD R KNEE CO CHROME CRUCE RET KAYA PERSO NA OCTAVIO [employer] [id number] Right 1 Implanted       Drains: * No LDAs found *    Findings: DJD    Electronically Signed by Burt Dow MD on 11/2/2020 at 12:21 PM

## 2020-11-02 NOTE — PROGRESS NOTES
TRANSFER - IN REPORT:    Verbal report received from [place] (name) on Teresa Sánchez  being received from PACU (unit) for routine post - op      Report consisted of patients Situation, Background, Assessment and   Recommendations(SBAR). Information from the following report(s) SBAR, Kardex, OR Summary, Intake/Output, MAR and Recent Results was reviewed with the receiving nurse. Opportunity for questions and clarification was provided. Assessment completed upon patients arrival to unit and care assumed.

## 2020-11-02 NOTE — PERIOP NOTES
TRANSFER - OUT REPORT:    Verbal report given to Erlinda Pelaezrubin (name) on Tirso Bound  being transferred to [address] [place] [address] Road room 559 (unit) for routine post - op       Report consisted of patients Situation, Background, Assessment and   Recommendations(SBAR). Time Pre op antibiotic given:1056  Anesthesia Stop time: 8160  Regan Present on Transfer to floor: NO  Order for Regan on Chart:NO  Discharge Prescriptions with Chart:NO    Information from the following report(s) SBAR, Kardex, OR Summary, Intake/Output, MAR, Med Rec Status and Cardiac Rhythm SR/SB was reviewed with the receiving nurse. Opportunity for questions and clarification was provided. Is the patient on 02? YES       L/Min 2L         Is the patient on a monitor? NO    Is the nurse transporting with the patient? NO    Surgical Waiting Area notified of patient's transfer from PACU? YES      The following personal items collected during your admission accompanied patient upon transfer:   Dental Appliance: Dental Appliances: At home  Vision:    Hearing Aid:    Jewelry: Jewelry: None  Clothing: Clothing: (bag of clothes returned to patient)  Other Valuables:  Other Valuables: (returned to patient)  Valuables sent to safe:

## 2020-11-02 NOTE — ANESTHESIA PREPROCEDURE EVALUATION
Relevant Problems   No relevant active problems       Anesthetic History   No history of anesthetic complications            Review of Systems / Medical History  Patient summary reviewed, nursing notes reviewed and pertinent labs reviewed    Pulmonary  Within defined limits                 Neuro/Psych   Within defined limits           Cardiovascular  Within defined limits  Hypertension          CAD         GI/Hepatic/Renal  Within defined limits              Endo/Other  Within defined limits  Diabetes    Arthritis     Other Findings              Physical Exam    Airway  Mallampati: II  TM Distance: > 6 cm  Neck ROM: normal range of motion   Mouth opening: Normal     Cardiovascular  Regular rate and rhythm,  S1 and S2 normal,  no murmur, click, rub, or gallop             Dental  No notable dental hx       Pulmonary  Breath sounds clear to auscultation               Abdominal  GI exam deferred       Other Findings            Anesthetic Plan    ASA: 3  Anesthesia type: MAC      Post-op pain plan if not by surgeon: peripheral nerve block single      Anesthetic plan and risks discussed with: Patient

## 2020-11-02 NOTE — PROGRESS NOTES
Problem: Diabetes Self-Management  Goal: *Disease process and treatment process  Description: Define diabetes and identify own type of diabetes; list 3 options for treating diabetes. Outcome: Progressing Towards Goal   Orders placed for sliding scale insulin and patient's blood sugar is being checked before each meal.     Problem: Knee Replacement: Day of Surgery/Unit  Goal: Activity/Safety  Outcome: Progressing Towards Goal  Patient willing to work with physical therapy. Problem: Hypertension  Goal: *Blood pressure within specified parameters  Outcome: Progressing Towards Goal   BP is within defined limits. Problem: Falls - Risk of  Goal: *Absence of Falls  Description: Document Margot Zamarripa Fall Risk and appropriate interventions in the flowsheet.   Outcome: Progressing Towards Goal  Note: Fall Risk Interventions:  Mobility Interventions: Patient to call before getting OOB, PT Consult for mobility concerns, PT Consult for assist device competence, OT consult for ADLs    Medication Interventions: Teach patient to arise slowly, Patient to call before getting OOB, Evaluate medications/consider consulting pharmacy    Elimination Interventions: Call light in reach, Patient to call for help with toileting needs

## 2020-11-02 NOTE — ANESTHESIA PROCEDURE NOTES
Peripheral Block    Performed by: Cruzito Cartwright DO  Authorized by: Cruzito Cartwright DO       Pre-procedure: Indications: at surgeon's request and post-op pain management    Preanesthetic Checklist: patient identified, risks and benefits discussed, site marked, timeout performed and patient being monitored      Block Type:   Block Type:   Adductor canal  Laterality:  Right  Monitoring:  Standard ASA monitoring, continuous pulse ox, frequent vital sign checks, heart rate, responsive to questions and oxygen  Injection Technique:  Single shot  Procedures: ultrasound guided    Patient Position: supine  Prep: DuraPrep    Location:  Mid thigh  Needle Type:  Stimuplex  Needle Gauge:  22 G  Needle Localization:  Ultrasound guidance  Medication Injected:  Ropivacaine (PF) (NAROPIN)(0.5%) 5 mg/mL injection, 30 mL    Assessment:  Number of attempts:  1  Injection Assessment:  Incremental injection every 5 mL, local visualized surrounding nerve on ultrasound, negative aspiration for blood, no paresthesia and no intravascular symptoms  Patient tolerance:  Patient tolerated the procedure well with no immediate complications

## 2020-11-02 NOTE — ANESTHESIA PROCEDURE NOTES
Spinal Block    Start time: 11/2/2020 10:39 AM  End time: 11/2/2020 10:49 AM  Performed by: Duc Bob CRNA  Authorized by: Arjun Carson DO     Pre-procedure:   Indications: at surgeon's request and primary anesthetic  Preanesthetic Checklist: patient identified, risks and benefits discussed, patient being monitored and timeout performed    Timeout Time: 10:39          Spinal Block:   Patient Position:  Seated  Prep Region:  Lumbar  Prep: DuraPrep      Location:  L2-3      Local Dose (mL):  2    Needle:   Needle Type:  Pencan  Needle Gauge:  24 G  Attempts:  2      Events: CSF confirmed, no blood with aspiration and no paresthesia        Assessment:  Insertion:  Uncomplicated  Patient tolerance:  Patient tolerated the procedure well with no immediate complications

## 2020-11-02 NOTE — PROGRESS NOTES
Primary Nurse Neil Mabry and Muriel Crocker., RN performed a dual skin assessment on this patient No impairment noted  Luiz score is 21

## 2020-11-02 NOTE — ROUTINE PROCESS
Patient: Mary Hay MRN: [id number]  SSN: [id number] YOB: 1950  Age: 69 y.o. Sex: male Patient is status post Procedure(s): RIGHT TOTAL KNEE REPLACEMENT. Surgeon(s) and Role: Jeannie Tovar MD - Primary Local/Dose/Irrigation:  SEE MAR Peripheral IV 11/02/20 Anterior;Right Forearm (Active) Site Assessment Clean, dry, & intact 11/02/20 1011 Phlebitis Assessment 0 11/02/20 1011 Infiltration Assessment 0 11/02/20 1011 Dressing Status Clean, dry, & intact; New 11/02/20 1011 Dressing Type Tape;Transparent 11/02/20 1011 Hub Color/Line Status Pink; Infusing 11/02/20 1011 Dressing/Packing:  Wound Knee Right-Dressing Type: Aquacel; Cast padding;Elastic bandage;Staples (11/02/20 1230) Splint/Cast:  ] Other:

## 2020-11-03 VITALS
HEART RATE: 71 BPM | OXYGEN SATURATION: 96 % | RESPIRATION RATE: 16 BRPM | SYSTOLIC BLOOD PRESSURE: 146 MMHG | BODY MASS INDEX: 30.91 KG/M2 | DIASTOLIC BLOOD PRESSURE: 70 MMHG | WEIGHT: 228.18 LBS | HEIGHT: 72 IN | TEMPERATURE: 98 F

## 2020-11-03 LAB
ANION GAP SERPL CALC-SCNC: 4 MMOL/L (ref 5–15)
BUN SERPL-MCNC: 18 MG/DL (ref 6–20)
BUN/CREAT SERPL: 18 (ref 12–20)
CALCIUM SERPL-MCNC: 8.3 MG/DL (ref 8.5–10.1)
CHLORIDE SERPL-SCNC: 109 MMOL/L (ref 97–108)
CO2 SERPL-SCNC: 24 MMOL/L (ref 21–32)
CREAT SERPL-MCNC: 0.99 MG/DL (ref 0.7–1.3)
GLUCOSE BLD STRIP.AUTO-MCNC: 162 MG/DL (ref 65–100)
GLUCOSE BLD STRIP.AUTO-MCNC: 272 MG/DL (ref 65–100)
GLUCOSE SERPL-MCNC: 210 MG/DL (ref 65–100)
HGB BLD-MCNC: 10.5 G/DL (ref 12.1–17)
POTASSIUM SERPL-SCNC: 3.9 MMOL/L (ref 3.5–5.1)
SERVICE CMNT-IMP: ABNORMAL
SERVICE CMNT-IMP: ABNORMAL
SODIUM SERPL-SCNC: 137 MMOL/L (ref 136–145)

## 2020-11-03 PROCEDURE — 74011250636 HC RX REV CODE- 250/636: Performed by: PHYSICIAN ASSISTANT

## 2020-11-03 PROCEDURE — 85018 HEMOGLOBIN: CPT

## 2020-11-03 PROCEDURE — 90471 IMMUNIZATION ADMIN: CPT

## 2020-11-03 PROCEDURE — 97165 OT EVAL LOW COMPLEX 30 MIN: CPT

## 2020-11-03 PROCEDURE — 97535 SELF CARE MNGMENT TRAINING: CPT

## 2020-11-03 PROCEDURE — 74011636637 HC RX REV CODE- 636/637: Performed by: PHYSICIAN ASSISTANT

## 2020-11-03 PROCEDURE — 97116 GAIT TRAINING THERAPY: CPT

## 2020-11-03 PROCEDURE — 74011000250 HC RX REV CODE- 250: Performed by: PHYSICIAN ASSISTANT

## 2020-11-03 PROCEDURE — 82962 GLUCOSE BLOOD TEST: CPT

## 2020-11-03 PROCEDURE — 36415 COLL VENOUS BLD VENIPUNCTURE: CPT

## 2020-11-03 PROCEDURE — 74011250637 HC RX REV CODE- 250/637: Performed by: PHYSICIAN ASSISTANT

## 2020-11-03 PROCEDURE — 99218 HC RM OBSERVATION: CPT

## 2020-11-03 PROCEDURE — 80048 BASIC METABOLIC PNL TOTAL CA: CPT

## 2020-11-03 PROCEDURE — 90686 IIV4 VACC NO PRSV 0.5 ML IM: CPT | Performed by: ORTHOPAEDIC SURGERY

## 2020-11-03 PROCEDURE — 74011250636 HC RX REV CODE- 250/636: Performed by: ORTHOPAEDIC SURGERY

## 2020-11-03 RX ADMIN — INFLUENZA VIRUS VACCINE 0.5 ML: 15; 15; 15; 15 SUSPENSION INTRAMUSCULAR at 10:27

## 2020-11-03 RX ADMIN — CEFAZOLIN SODIUM 2 G: 300 INJECTION, POWDER, LYOPHILIZED, FOR SOLUTION INTRAVENOUS at 02:42

## 2020-11-03 RX ADMIN — OXYCODONE 5 MG: 5 TABLET ORAL at 10:50

## 2020-11-03 RX ADMIN — TICAGRELOR 90 MG: 90 TABLET ORAL at 10:22

## 2020-11-03 RX ADMIN — ASPIRIN 81 MG: 81 TABLET, COATED ORAL at 10:22

## 2020-11-03 RX ADMIN — INSULIN LISPRO 7 UNITS: 100 INJECTION, SOLUTION INTRAVENOUS; SUBCUTANEOUS at 11:22

## 2020-11-03 RX ADMIN — ACETAMINOPHEN 650 MG: 325 TABLET ORAL at 07:22

## 2020-11-03 RX ADMIN — OXYCODONE 5 MG: 5 TABLET ORAL at 07:21

## 2020-11-03 RX ADMIN — AMLODIPINE BESYLATE 10 MG: 5 TABLET ORAL at 07:21

## 2020-11-03 RX ADMIN — INSULIN LISPRO 3 UNITS: 100 INJECTION, SOLUTION INTRAVENOUS; SUBCUTANEOUS at 07:20

## 2020-11-03 RX ADMIN — LISINOPRIL 40 MG: 20 TABLET ORAL at 07:22

## 2020-11-03 RX ADMIN — CARVEDILOL 12.5 MG: 12.5 TABLET, FILM COATED ORAL at 07:21

## 2020-11-03 RX ADMIN — DOCUSATE SODIUM 50MG AND SENNOSIDES 8.6MG 1 TABLET: 8.6; 5 TABLET, FILM COATED ORAL at 10:22

## 2020-11-03 NOTE — PROGRESS NOTES
JONO: Home with home health. [facility]  has accepted patient. Patient owns walker and cane. Family will transport patient home    Care Management Interventions  PCP Verified by CM: Yes  Mode of Transport at Discharge: Other (see comment)(family/car)  Transition of Care Consult (CM Consult): Home Health, Discharge [place] [phone] [address]: No  Reason Outside [place]: Patient already serviced by other home care/hospice agency  MyChart Signup: No  Discharge Durable Medical Equipment: No  Physical Therapy Consult: Yes  Occupational Therapy Consult: Yes  Speech Therapy Consult: No  Current Support Network: Lives Alone  Confirm Follow Up Transport: Family  The Patient and/or Patient Representative was Provided with a Choice of Provider and Agrees with the Discharge Plan?: Yes  Freedom of Choice List was Provided with Basic Dialogue that Supports the Patient's Individualized Plan of Care/Goals, Treatment Preferences and Shares the Quality Data Associated with the Providers?: Yes  Discharge Location  Discharge Placement: Home with home health    Reason for Admission:   RIGHT TOTAL KNEE REPLACEMENT                    RUR Score:   N/A (obs)                  Plan for utilizing home health: The Plan for Transition of Care is related to the following treatment goals: home health, patient prefers [facility]. FOC placed on chart. The Patient and/or patient representative  was provided with a choice of provider and agrees   with the discharge plan. [x] Yes [] No    Freedom of choice list was provided with basic dialogue that supports the patient's individualized plan of care/goals, treatment preferences and shares the quality data associated with the providers.  [x] Yes [] No        PCP: First and Last name:  Drake oWo NP   Name of Practice:    Are you a current patient: Yes/No: yes   Approximate date of last visit: few mos ago   Can you participate in a virtual visit with your PCP: Current Advanced Directive/Advance Care Plan: no AMD on file                         Transition of Care Plan:  Home with [facility]                      Observation notice provided in writing to patient and/or caregiver as well as verbal explanation of the policy. Patients who are in outpatient status also receive the Observation notice.       EUGENIO Finch/CRM

## 2020-11-03 NOTE — PROGRESS NOTES
Bedside shift change report given to Chel Dhaliwal RN (oncoming nurse) by Demian Cao (offgoing nurse). Report included the following information SBAR, Kardex, Procedure Summary, Intake/Output, MAR and Recent Results.

## 2020-11-03 NOTE — NURSE NAVIGATOR
IV removed. Dressing change supplies and ice gel packs provided. Discharge instructions reviewed; all questions addressed. Assisted into car for transport home with family.

## 2020-11-03 NOTE — PROGRESS NOTES
Problem: Mobility Impaired (Adult and Pediatric)  Goal: *Acute Goals and Plan of Care (Insert Text)  Description: FUNCTIONAL STATUS PRIOR TO ADMISSION: Patient was independent and active without use of DME.    HOME SUPPORT PRIOR TO ADMISSION: The patient lived alone with brothers and friends to provide assistance. Physical Therapy Goals  Initiated 11/2/2020    1. Patient will move from supine to sit and sit to supine , scoot up and down, and roll side to side in bed with modified independence within 4 days. 2. Patient will perform sit to stand with modified independence within 4 days. 3. Patient will ambulate with modified independence for 150 feet with the least restrictive device within 4 days. 4. Patient will ascend/descend 4 stairs with cane and one handrail(s) with modified independence within 4 days. 5. Patient will perform home exercise program per protocol with independence within 4 days. 6. Patient will demonstrate AROM 0-90 degrees in operative joint within 4 days. Outcome: Resolved/Met   PHYSICAL THERAPY TREATMENT/DISCHARGE  Patient: Kelley Morrell (23 y.o. male)  Date: 11/3/2020  Diagnosis: Arthritis of knee, right [M17.11]   Arthritis of knee, right  Procedure(s) (LRB):  RIGHT TOTAL KNEE REPLACEMENT (Right) 1 Day Post-Op  Precautions: Fall, WBAT  Chart, physical therapy assessment, plan of care and goals were reviewed. ASSESSMENT  Patient continues with skilled PT services and has met acute care goals. Patient is cleared for discharge from PT standpoint. Patient  is independent with post-op TKA exercise protocol and has same in written, illlustrated form. Educated patient on Discharge Instructions relating to PT program progression post-discharge. He demonstrated/verbalized understanding. Other factors to consider for discharge: Motivated/A & O x 4/Supportive Family/Independent PLOF/previous TKA in August 2020.          PLAN :  Patient will be discharged from acute skilled physical therapy at this time. Rationale for discharge:  Goals achieved    Recommendation for discharge: (in order for the patient to meet his/her long term goals)  Physical therapy at least 2 days/week in the home     This discharge recommendation:  Has been made in collaboration with the attending provider and/or case management    IF patient discharges home will need the following DME: patient owns DME required for discharge       SUBJECTIVE:   Patient stated I am ready to go home.     OBJECTIVE DATA SUMMARY:   Critical Behavior:  Neurologic State: Alert  Orientation Level: Oriented X4  Cognition: Appropriate for age attention/concentration  Safety/Judgement: Good awareness of safety precautions  Functional Mobility Training:  Bed Mobility:  Rolling: Supervision  Supine to Sit: Supervision  Sit to Supine: Supervision  Scooting: Supervision        Transfers:  Sit to Stand: Supervision  Stand to Sit: Supervision                             Balance:  Sitting: Intact  Standing: Intact; With support  Ambulation/Gait Training:  Distance (ft): 300 Feet (ft)  Assistive Device: Walker, rolling;Gait belt  Ambulation - Level of Assistance: Supervision        Gait Abnormalities: Antalgic  Right Side Weight Bearing: As tolerated     Base of Support: Widened;Shift to left  Stance: Right decreased  Speed/Keila: Slow  Step Length: Left shortened  Swing Pattern: Right asymmetrical          Stairs:  Number of Stairs Trained: 4  Stairs - Level of Assistance: Stand-by assistance   Rail Use: Both    Therapeutic Exercises:   Patient  is independent with post-op TKA exercise protocol and has same in written, illlustrated form. Pain Rating:  3/10    Activity Tolerance:   Good    After treatment patient left in no apparent distress:   Sitting in chair, Call bell within reach, and nurse notified.     COMMUNICATION/COLLABORATION:   The patients plan of care was discussed with: Occupational therapist, Registered nurse, and Case management.      Gloria Chu   Time Calculation: 30 mins

## 2020-11-03 NOTE — PROGRESS NOTES
Problem: Diabetes Self-Management  Goal: *Disease process and treatment process  Description: Define diabetes and identify own type of diabetes; list 3 options for treating diabetes. Outcome: Progressing Towards Goal  Goal: *Incorporating nutritional management into lifestyle  Description: Describe effect of type, amount and timing of food on blood glucose; list 3 methods for planning meals. Outcome: Progressing Towards Goal  Goal: *Incorporating physical activity into lifestyle  Description: State effect of exercise on blood glucose levels. Outcome: Progressing Towards Goal  Goal: *Developing strategies to promote health/change behavior  Description: Define the ABC's of diabetes; identify appropriate screenings, schedule and personal plan for screenings. Outcome: Progressing Towards Goal  Goal: *Using medications safely  Description: State effect of diabetes medications on diabetes; name diabetes medication taking, action and side effects. Outcome: Progressing Towards Goal  Goal: *Monitoring blood glucose, interpreting and using results  Description: Identify recommended blood glucose targets  and personal targets. Outcome: Progressing Towards Goal  Goal: *Prevention, detection, treatment of acute complications  Description: List symptoms of hyper- and hypoglycemia; describe how to treat low blood sugar and actions for lowering  high blood glucose level. Outcome: Progressing Towards Goal  Goal: *Prevention, detection and treatment of chronic complications  Description: Define the natural course of diabetes and describe the relationship of blood glucose levels to long term complications of diabetes.   Outcome: Progressing Towards Goal  Goal: *Developing strategies to address psychosocial issues  Description: Describe feelings about living with diabetes; identify support needed and support network  Outcome: Progressing Towards Goal     Problem: Patient Education: Go to Patient Education Activity  Goal: Patient/Family Education  Outcome: Progressing Towards Goal     Problem: Knee Replacement: Day of Surgery/Unit  Goal: Off Pathway (Use only if patient is Off Pathway)  Outcome: Progressing Towards Goal  Goal: Activity/Safety  Outcome: Progressing Towards Goal  Goal: Consults, if ordered  Outcome: Progressing Towards Goal  Goal: Diagnostic Test/Procedures  Outcome: Progressing Towards Goal  Goal: Nutrition/Diet  Outcome: Progressing Towards Goal  Goal: Medications  Outcome: Progressing Towards Goal  Goal: Respiratory  Outcome: Progressing Towards Goal  Goal: Treatments/Interventions/Procedures  Outcome: Progressing Towards Goal  Goal: Psychosocial  Outcome: Progressing Towards Goal  Goal: *Initiate mobility  Outcome: Progressing Towards Goal  Goal: *Optimal pain control at patient's stated goal  Outcome: Progressing Towards Goal  Goal: *Hemodynamically stable  Outcome: Progressing Towards Goal

## 2020-11-03 NOTE — PROGRESS NOTES
OCCUPATIONAL THERAPY EVALUATION/DISCHARGE  Patient: Michael Sanchez (46 y.o. male)  Date: 11/3/2020  Primary Diagnosis: Arthritis of knee, right [M17.11]  Procedure(s) (LRB):  RIGHT TOTAL KNEE REPLACEMENT (Right) 1 Day Post-Op   Precautions:   Fall, WBAT    ASSESSMENT  Based on the objective data described below, the patient presents with great performance with self care and functional mobility following admission for TKR. Pt did great with all activities this morning and was able to progress with dressing and bathing activities. Pt was able to progress with all activities following training and education. Pt is independent with recommendations. Pt strongly encouraged to avoid use of alcohol during recovery as he mentioned this numerous times throughout session. Recommend home with family support. Current Level of Function (ADLs/self-care): supervision    Functional Outcome Measure: The patient scored 95 on the Barthel Index outcome measure which is indicative of 5% impairment with ADL activities. Other factors to consider for discharge: none noted     PLAN :  Recommend with staff: OOB to chair TID for meals. Recommend progression to and from bathroom with use of walker and gait belt for toileting. Recommendation for discharge: (in order for the patient to meet his/her long term goals)  No skilled occupational therapy/ follow up rehabilitation needs identified at this time. This discharge recommendation:  Has been made in collaboration with the attending provider and/or case management    IF patient discharges home will need the following DME: none       SUBJECTIVE:   Patient stated I am feeling alright.     OBJECTIVE DATA SUMMARY:   HISTORY:   Past Medical History:   Diagnosis Date    Arthritis     OSTEO-KNEES    CAD (coronary artery disease) 11/19/2019    X2 STENTS    Diabetes ([facility] [address]     Exposure to 2019 novel coronavirus 5/21/2020    High cholesterol     Hypertension      Past Allie Alvarado RN     Surgical History:   Procedure Laterality Date    CARDIAC SURG PROCEDURE UNLIST  11/2019    placement of stent X2    HX CAROTID STENT      HX KNEE REPLACEMENT Left 08/03/2020    HX OTHER SURGICAL      oral surgery       Prior Level of Function/Environment/Context: pt was independent at home prior to surgery. Expanded or extensive additional review of patient history:   Home Situation  Home Environment: Trailer/mobile home  # Steps to Enter: 4  Rails to Enter: Yes  Hand Rails : Right  Wheelchair Ramp: No  One/Two Story Residence: One story  Living Alone: Yes  Support Systems: Family member(s)  Patient Expects to be Discharged to[de-identified] Trailer/mobile home  Current DME Used/Available at Home: [address], [place], straight, Walker, rolling  Tub or Shower Type: Shower    Hand dominance: Right    EXAMINATION OF PERFORMANCE DEFICITS:  Cognitive/Behavioral Status:  Neurologic State: Alert  Orientation Level: Oriented X4  Cognition: Appropriate for age attention/concentration  Perception: Appears intact  Perseveration: No perseveration noted  Safety/Judgement: Good awareness of safety precautions    Skin: see nursing notes; ace removed and Aquacel intact. Edema: none    Hearing: Auditory  Auditory Impairment: None    Vision/Perceptual:                           Acuity: Within Defined Limits         Range of Motion:    AROM: Within functional limits  PROM: Within functional limits                      Strength:    Strength: Within functional limits                Coordination:  Coordination: Within functional limits  Fine Motor Skills-Upper: Left Intact; Right Intact    Gross Motor Skills-Upper: Left Intact; Right Intact    Tone & Sensation:    Tone: Normal  Sensation: Intact                      Balance:  Sitting: Intact  Standing: Intact; With support    Functional Mobility and Transfers for ADLs:  Bed Mobility:  Rolling: Supervision  Supine to Sit: Supervision  Sit to Supine: Supervision  Scooting: Supervision    Transfers:  Sit to Stand: Supervision  Stand to Sit: Supervision  Bed to Chair: Supervision  Bathroom Mobility: Supervision/set up  Toilet Transfer : Supervision    ADL Assessment:  Feeding: Supervision    Oral Facial Hygiene/Grooming: Supervision    Bathing: Supervision    Upper Body Dressing: Supervision    Lower Body Dressing: Minimum assistance    Toileting: Supervision                ADL Intervention and task modifications:    Dressing training following TKR:  Pt participated with ADL routine while sitting EOB. Pt provided setup for sponge bathing and given instructions for safety and energy conservation with bathing activities. Pt completed lower body bathing with min A for feet. Pt provided training and education for donning clothing over surgical leg first.  Pt completed donning underpants with supervision, pants with supervsiion, socks with mod A, and shoes with mod A. Pt did well with eduction and training but will benefit from reinforcement of education provided. Following intervention, pt left sitting EOB awaiting PT intervention. Cognitive Retraining  Safety/Judgement: Good awareness of safety precautions    Functional Measure:  Barthel Index:    Bathin  Bladder: 10  Bowels: 10  Groomin  Dressing: 10  Feeding: 10  Mobility: 15  Stairs: 10  Toilet Use: 10  Transfer (Bed to Chair and Back): 15  Total: 95/100        The Barthel ADL Index: Guidelines  1. The index should be used as a record of what a patient does, not as a record of what a patient could do. 2. The main aim is to establish degree of independence from any help, physical or verbal, however minor and for whatever reason. 3. The need for supervision renders the patient not independent. 4. A patient's performance should be established using the best available evidence. Asking the patient, friends/relatives and nurses are the usual sources, but direct observation and common sense are also important.  However direct testing is not needed. 5. Usually the patient's performance over the preceding 24-48 hours is important, but occasionally longer periods will be relevant. 6. Middle categories imply that the patient supplies over 50 per cent of the effort. 7. Use of aids to be independent is allowed. Lasalle Fears., Barthel, D.W. (9097). Functional evaluation: the Barthel Index. [address] [facility] (14)2. REBECA Sims, Kayla Adam., Janet Crawford., [place], [address] [place] [address] (1999). Measuring the change indisability after inpatient rehabilitation; comparison of the responsiveness of the Barthel Index and Functional Roosevelt Measure. Journal of Neurology, Neurosurgery, and Psychiatry, 66(4), 506-554. Connie Wen, N.J.A, JUAN J Quintanilla, & Siomara Clayton MMAISHA. (2004.) Assessment of post-stroke quality of life in cost-effectiveness studies: The usefulness of the Barthel Index and the EuroQoL-5D. Quality of Life Research, 15, 939-77       Occupational Therapy Evaluation Charge Determination   History Examination Decision-Making   LOW Complexity : Brief history review  LOW Complexity : 1-3 performance deficits relating to physical, cognitive , or psychosocial skils that result in activity limitations and / or participation restrictions  LOW Complexity : No comorbidities that affect functional and no verbal or physical assistance needed to complete eval tasks       Based on the above components, the patient evaluation is determined to be of the following complexity level: LOW   Pain Rating  3/10 pain in left knee    Activity Tolerance:   Good    After treatment patient left in no apparent distress:    Sitting in chair and Call bell within reach    COMMUNICATION/EDUCATION:   The patients plan of care was discussed with: Physical therapist and Registered nurse.      Thank you for this referral.  Greg Pineda, OT  Time Calculation: 15 mins

## 2020-11-03 NOTE — PROGRESS NOTES
Problem: Diabetes Self-Management  Goal: *Disease process and treatment process  Description: Define diabetes and identify own type of diabetes; list 3 options for treating diabetes. Outcome: Progressing Towards Goal  Goal: *Using medications safely  Description: State effect of diabetes medications on diabetes; name diabetes medication taking, action and side effects. Outcome: Progressing Towards Goal  Goal: *Monitoring blood glucose, interpreting and using results  Description: Identify recommended blood glucose targets  and personal targets. Outcome: Progressing Towards Goal  Goal: *Prevention, detection, treatment of acute complications  Description: List symptoms of hyper- and hypoglycemia; describe how to treat low blood sugar and actions for lowering  high blood glucose level. Outcome: Progressing Towards Goal     Problem: Hypertension  Goal: *Blood pressure within specified parameters  Outcome: Progressing Towards Goal  Goal: *Fluid volume balance  Outcome: Progressing Towards Goal  Goal: *Labs within defined limits  Outcome: Progressing Towards Goal     Problem: Falls - Risk of  Goal: *Absence of Falls  Description: Document Sai Fall Risk and appropriate interventions in the flowsheet.   Outcome: Progressing Towards Goal  Note: Fall Risk Interventions:  Mobility Interventions: Communicate number of staff needed for ambulation/transfer, Patient to call before getting OOB, Utilize walker, cane, or other assistive device, Utilize gait belt for transfers/ambulation         Medication Interventions: Assess postural VS orthostatic hypotension, Patient to call before getting OOB, Teach patient to arise slowly    Elimination Interventions: Call light in reach, Patient to call for help with toileting needs

## 2020-11-03 NOTE — PROGRESS NOTES
Bedside and Verbal shift change report given to Jerri Candelario (oncoming nurse) by [employer] (offgoing nurse). Report included the following information SBAR, Kardex, OR Summary, Intake/Output and MAR.

## 2021-04-20 ENCOUNTER — HOSPITAL ENCOUNTER (EMERGENCY)
Age: 71
Discharge: HOME | End: 2021-04-20
Attending: STUDENT IN AN ORGANIZED HEALTH CARE EDUCATION/TRAINING PROGRAM
Payer: MEDICARE

## 2021-04-20 VITALS
HEIGHT: 72 IN | HEART RATE: 78 BPM | RESPIRATION RATE: 18 BRPM | WEIGHT: 233 LBS | SYSTOLIC BLOOD PRESSURE: 138 MMHG | BODY MASS INDEX: 31.56 KG/M2 | DIASTOLIC BLOOD PRESSURE: 71 MMHG | OXYGEN SATURATION: 100 % | TEMPERATURE: 97.8 F

## 2021-04-20 DIAGNOSIS — S61.011A LACERATION OF RIGHT THUMB WITHOUT FOREIGN BODY, NAIL DAMAGE STATUS UNSPECIFIED, INITIAL ENCOUNTER: Primary | ICD-10-CM

## 2021-04-20 PROCEDURE — 74011000250 HC RX REV CODE- 250: Performed by: STUDENT IN AN ORGANIZED HEALTH CARE EDUCATION/TRAINING PROGRAM

## 2021-04-20 PROCEDURE — 90471 IMMUNIZATION ADMIN: CPT

## 2021-04-20 PROCEDURE — 74011250636 HC RX REV CODE- 250/636: Performed by: PHYSICIAN ASSISTANT

## 2021-04-20 PROCEDURE — 75810000293 HC SIMP/SUPERF WND  RPR

## 2021-04-20 PROCEDURE — 99283 EMERGENCY DEPT VISIT LOW MDM: CPT

## 2021-04-20 PROCEDURE — 90715 TDAP VACCINE 7 YRS/> IM: CPT | Performed by: PHYSICIAN ASSISTANT

## 2021-04-20 PROCEDURE — 74011250637 HC RX REV CODE- 250/637: Performed by: STUDENT IN AN ORGANIZED HEALTH CARE EDUCATION/TRAINING PROGRAM

## 2021-04-20 RX ORDER — CEPHALEXIN 250 MG/1
250 CAPSULE ORAL 3 TIMES DAILY
Qty: 21 CAP | Refills: 0 | Status: SHIPPED | OUTPATIENT
Start: 2021-04-20

## 2021-04-20 RX ORDER — LIDOCAINE HYDROCHLORIDE 20 MG/ML
0.2 INJECTION, SOLUTION INFILTRATION; PERINEURAL ONCE
Status: COMPLETED | OUTPATIENT
Start: 2021-04-20 | End: 2021-04-20

## 2021-04-20 RX ORDER — ACETAMINOPHEN 325 MG/1
650 TABLET ORAL
Status: COMPLETED | OUTPATIENT
Start: 2021-04-20 | End: 2021-04-20

## 2021-04-20 RX ADMIN — ACETAMINOPHEN 650 MG: 325 TABLET, FILM COATED ORAL at 15:01

## 2021-04-20 RX ADMIN — TETANUS TOXOID, REDUCED DIPHTHERIA TOXOID AND ACELLULAR PERTUSSIS VACCINE, ADSORBED 0.5 ML: 5; 2.5; 8; 8; 2.5 SUSPENSION INTRAMUSCULAR at 15:01

## 2021-04-20 RX ADMIN — LIDOCAINE HYDROCHLORIDE 4 MG: 20 INJECTION, SOLUTION INFILTRATION; PERINEURAL at 14:23

## 2021-04-20 NOTE — ED PROVIDER NOTES
EMERGENCY DEPARTMENT HISTORY AND PHYSICAL EXAM      Date: 4/20/2021  Patient Name: Heather Angulo    History of Presenting Illness     Chief Complaint   Patient presents with    Laceration       History Provided By: Patient    HPI: Heather Angulo, 69 y.o. male with a past medical history significant diabetes, hypertension and CAD presents to the ED with cc of laceration to right thumb. Patient was attempting to repair a lawnmower when blade pain loose cut him in lateral aspect of right thumb, denies any numbness tingling extremity, patient is on Brilinta for CAD, held pressure to site did not wash it at home. Unknown tetanus status. There are no other complaints, changes, or physical findings at this time. PCP: Vishal Ramirez NP    No current facility-administered medications on file prior to encounter. Current Outpatient Medications on File Prior to Encounter   Medication Sig Dispense Refill    ticagrelor (Brilinta) 90 mg tablet Take 90 mg by mouth two (2) times a day.  senna-docusate (PERICOLACE) 8.6-50 mg per tablet Take 1 Tab by mouth two (2) times a day. Indications: constipation 30 Tab 0    aspirin (ASPIR-81 PO) Take 81 mg by mouth every morning.  carvediloL (COREG) 12.5 mg tablet Take 12.5 mg by mouth two (2) times daily (with meals).  insulin detemir U-100 (Levemir U-100 Insulin) 100 unit/mL injection 61 Units by SubCUTAneous route nightly.  glucose blood VI test strips (Accu-Chek Ada Plus test strp) strip by Does Not Apply route See Admin Instructions.  amLODIPine (NORVASC) 10 mg tablet Take 10 mg by mouth every morning.  atorvastatin (LIPITOR) 40 mg tablet Take 40 mg by mouth nightly.  Insulin Needles, Disposable, (BD Ultra-Fine Micro Pen Needle) 32 gauge x 1/4\" ndle by Does Not Apply route.  ticagrelor (BRILINTA) 90 mg tablet Take 90 mg by mouth two (2) times a day.       glipiZIDE SR (GLUCOTROL XL) 10 mg CR tablet Take 10 mg by mouth two (2) times a day.  lisinopriL (PRINIVIL, ZESTRIL) 40 mg tablet Take 40 mg by mouth every morning.  metFORMIN ER (GLUCOPHAGE XR) 750 mg tablet Take 750 mg by mouth daily (with dinner). Past History     Past Medical History:  Past Medical History:   Diagnosis Date    Arthritis     OSTEO-KNEES    CAD (coronary artery disease) 11/19/2019    X2 STENTS    Diabetes ([address]     Exposure to  novel coronavirus 2020    High cholesterol     Hypertension        Past Surgical History:  Past Surgical History:   Procedure Laterality Date    HX CAROTID STENT      HX KNEE REPLACEMENT Left 2020    HX OTHER SURGICAL      oral surgery    [place] CARDIAC SURG PROCEDURE UNLIST  2019    placement of stent X2       Family History:  Family History   Problem Relation Age of Onset    Cancer Mother         breast, brain    Heart Attack Father     Diabetes Sister     Heart Attack Sister             Cancer Sister         BRAIN TUMOR    No Known Problems Son     Anesth Problems Neg Hx        Social History:  Social History     Tobacco Use    Smoking status: Former Smoker     Packs/day: 2.00     Years: 40.00     Pack years: 80.00     Quit date:      Years since quittin.3    Smokeless tobacco: Never Used   Substance Use Topics    Alcohol use: Yes     Alcohol/week: 6.0 - 12.0 standard drinks     Types: 6 - 12 Cans of beer per week     Frequency: Never     Comment: BEER-DAILY TO EVERY 3 DAYS    Drug use: Not Currently       Allergies:  No Known Allergies      Review of Systems     Review of Systems   Eyes: Negative for photophobia and visual disturbance. Cardiovascular: Negative for chest pain and palpitations. Gastrointestinal: Negative for abdominal pain and nausea. Musculoskeletal: Negative for arthralgias and neck pain. Skin: Positive for wound. Negative for color change and pallor. Neurological: Negative for dizziness, weakness, numbness and headaches.        Physical Exam Physical Exam  Vitals signs and nursing note reviewed. Constitutional:       Appearance: Normal appearance. He is normal weight. HENT:      Head: Normocephalic and atraumatic. Eyes:      Conjunctiva/sclera: Conjunctivae normal.      Pupils: Pupils are equal, round, and reactive to light. Neck:      Musculoskeletal: Normal range of motion and neck supple. Cardiovascular:      Rate and Rhythm: Normal rate. Pulmonary:      Effort: Pulmonary effort is normal.   Abdominal:      General: Abdomen is flat. Musculoskeletal: Normal range of motion. Hands:    Skin:     General: Skin is warm. Neurological:      General: No focal deficit present. Mental Status: He is alert. Mental status is at baseline. Sensory: No sensory deficit. Diagnostic Study Results     Labs -   No results found for this or any previous visit (from the past 12 hour(s)). Radiologic Studies -   @lastxrresult@  CT Results  (Last 48 hours)    None        CXR Results  (Last 48 hours)    None            Medical Decision Making   I am the first provider for this patient. I reviewed the vital signs, available nursing notes, past medical history, past surgical history, family history and social history. Vital Signs-Reviewed the patient's vital signs. Patient Vitals for the past 12 hrs:   Temp Pulse Resp BP SpO2   04/20/21 1303 97.8 °F (36.6 °C) 78 18 138/71 100 %       Records Reviewed: Nursing Notes and Old Medical Records    The patient presents with lac to finger with a differential diagnosis of finger laceration, tendon injury, bony injury      Provider Notes (Medical Decision Making):     MDM   70yom, pmh of DM, htn, CAD, on brelinta, presents to the ED after sustaining lac to R thumb. Physical shows 5cm laceration to R thumb, no bony tenderness, distal SLT intact, able to flex thumb fully    Will repair lac, update tetanus, discharge patient home with RX for amoxicillin.        ED Course:   Initial assessment performed. The patients presenting problems have been discussed, and they are in agreement with the care plan formulated and outlined with them. I have encouraged them to ask questions as they arise throughout their visit. PROCEDURES  Wound Repair    Date/Time: 4/20/2021 8:58 PM  Performed by: attendingLocation details: right thumb  Wound length:2.6 - 7.5 cm  Anesthesia: local infiltration    Anesthesia:  Local Anesthetic: lidocaine 2% without epinephrine  Anesthetic total: 2 mL  Irrigation solution: saline  Irrigation method: syringe  Debridement: none  Skin closure: 4-0 nylon  Number of sutures: 6  Technique: simple  Approximation: close  Dressing: 4x4 and gauze roll  Patient tolerance: patient tolerated the procedure well with no immediate complications  My total time at bedside, performing this procedure was 1-15 minutes. PLAN:  1. Discharge Medication List as of 4/20/2021  3:12 PM      START taking these medications    Details   cephALEXin (Keflex) 250 mg capsule Take 1 Cap by mouth three (3) times daily. , Normal, Disp-21 Cap, R-0         CONTINUE these medications which have NOT CHANGED    Details   !! ticagrelor (Brilinta) 90 mg tablet Take 90 mg by mouth two (2) times a day., Historical Med      senna-docusate (PERICOLACE) 8.6-50 mg per tablet Take 1 Tab by mouth two (2) times a day. Indications: constipation, Normal, Disp-30 Tab,R-0      aspirin (ASPIR-81 PO) Take 81 mg by mouth every morning., Historical Med      carvediloL (COREG) 12.5 mg tablet Take 12.5 mg by mouth two (2) times daily (with meals). , Historical Med      insulin detemir U-100 (Levemir U-100 Insulin) 100 unit/mL injection 61 Units by SubCUTAneous route nightly., Historical Med      glucose blood VI test strips (Accu-Chek Ada Plus test strp) strip by Does Not Apply route See Admin Instructions. , Historical Med      amLODIPine (NORVASC) 10 mg tablet Take 10 mg by mouth every morning., Historical Med atorvastatin (LIPITOR) 40 mg tablet Take 40 mg by mouth nightly., Historical Med      Insulin Needles, Disposable, (BD Ultra-Fine Micro Pen Needle) 32 gauge x 1/4\" ndle by Does Not Apply route., Historical Med      !! ticagrelor (BRILINTA) 90 mg tablet Take 90 mg by mouth two (2) times a day., Historical Med      glipiZIDE SR (GLUCOTROL XL) 10 mg CR tablet Take 10 mg by mouth two (2) times a day., Historical Med      lisinopriL (PRINIVIL, ZESTRIL) 40 mg tablet Take 40 mg by mouth every morning., Historical Med      metFORMIN ER (GLUCOPHAGE XR) 750 mg tablet Take 750 mg by mouth daily (with dinner). , Historical Med       !! - Potential duplicate medications found. Please discuss with provider. 2.   Follow-up Information     Follow up With Specialties Details Why Contact Info    Mara Seo NP Nurse Practitioner  As needed [address] [id number] [phone]      [facility] EMERGENCY DEPT Emergency Medicine In 10 days For suture removal [address]  [phone]        Return to ED if worse     Diagnosis     Clinical Impression:   1.  Laceration of right thumb without foreign body, nail damage status unspecified, initial encounter

## 2021-04-20 NOTE — ED NOTES
Pt alert and oriented x 4. gcs 15. Discharge instructions given and reviewed with pt by writer. Pt verbalized understanding. Pt ambulated out of ED. No concerns voiced by pt. No signs of acute distress noted.

## 2021-04-20 NOTE — ED TRIAGE NOTES
Right thumb laceration by lawnmower blade. Moderate bleeding, controlled with pressure at triage. Unknown last tetanus.

## 2021-04-21 ENCOUNTER — OFFICE VISIT (OUTPATIENT)
Dept: PODIATRY | Age: 71
End: 2021-04-21
Payer: MEDICARE

## 2021-04-21 VITALS
TEMPERATURE: 97.1 F | OXYGEN SATURATION: 99 % | WEIGHT: 242.6 LBS | SYSTOLIC BLOOD PRESSURE: 168 MMHG | BODY MASS INDEX: 32.86 KG/M2 | HEIGHT: 72 IN | HEART RATE: 69 BPM | DIASTOLIC BLOOD PRESSURE: 84 MMHG

## 2021-04-21 DIAGNOSIS — B35.1 ONYCHOMYCOSIS: ICD-10-CM

## 2021-04-21 DIAGNOSIS — E11.42 TYPE 2 DIABETES MELLITUS WITH DIABETIC POLYNEUROPATHY, WITHOUT LONG-TERM CURRENT USE OF INSULIN (HCC): Primary | ICD-10-CM

## 2021-04-21 DIAGNOSIS — E11.49 OTHER DIABETIC NEUROLOGICAL COMPLICATION ASSOCIATED WITH TYPE 2 DIABETES MELLITUS (HCC): ICD-10-CM

## 2021-04-21 PROCEDURE — 99213 OFFICE O/P EST LOW 20 MIN: CPT | Performed by: PODIATRIST

## 2021-04-21 PROCEDURE — G8427 DOCREV CUR MEDS BY ELIG CLIN: HCPCS | Performed by: PODIATRIST

## 2021-04-21 PROCEDURE — 3017F COLORECTAL CA SCREEN DOC REV: CPT | Performed by: PODIATRIST

## 2021-04-21 PROCEDURE — 2022F DILAT RTA XM EVC RTNOPTHY: CPT | Performed by: PODIATRIST

## 2021-04-21 PROCEDURE — G8432 DEP SCR NOT DOC, RNG: HCPCS | Performed by: PODIATRIST

## 2021-04-21 PROCEDURE — 3046F HEMOGLOBIN A1C LEVEL >9.0%: CPT | Performed by: PODIATRIST

## 2021-04-21 PROCEDURE — 11721 DEBRIDE NAIL 6 OR MORE: CPT | Performed by: PODIATRIST

## 2021-04-21 PROCEDURE — G8536 NO DOC ELDER MAL SCRN: HCPCS | Performed by: PODIATRIST

## 2021-04-21 PROCEDURE — 1101F PT FALLS ASSESS-DOCD LE1/YR: CPT | Performed by: PODIATRIST

## 2021-04-21 PROCEDURE — G8417 CALC BMI ABV UP PARAM F/U: HCPCS | Performed by: PODIATRIST

## 2021-04-21 NOTE — PROGRESS NOTES
1. Have you been to the ER, urgent care clinic since your last visit? Hospitalized since your last visit? Yes Where: [address]    2. Have you seen or consulted any other health care providers outside of the [address] [place] [address] since your last visit? Include any pap smears or colon screening.  No     Chief Complaint   Patient presents with    Diabetic Foot Exam     last pcp visit 2 months ago; glucose today 189

## 2021-04-28 NOTE — PROGRESS NOTES
Paloma Stover is a 69 y.o. male who presents for follow-up of Type 2 diabetes mellitus foot care/exam.  Current symptoms/problems include paresthesias of the feet: tolerable and have been unchanged. Symptoms have been present for several years. Pt states he has close f/u with PCP and states he checks his blood sugar regularly. He states his last hemoglobin A1c was 10.4%. He states he has had slight changes in his medications from his cardiologist.  Denies any other changes in his medications. He denies any recent trauma. He denies any local/systemic signs of infection. He denies any other LE complaints. Known diabetic complications: peripheral neuropathy and cardiovascular disease  Cardiovascular risk factors: smoking/ tobacco exposure, dyslipidemia, diabetes mellitus, obesity, sedentary life style, male gender, hypertension  Current diabetic medications include oral agents (triple therapy)  Eye exam current (within one year): unknown  Current exercise: no regular exercise    Home blood sugar records: trend: fluctuating a bit  PCP: Martha Mays NP (last visit 02/10/2021)    Review of Systems   Constitutional: Negative. HENT: Negative. Eyes: Negative. Respiratory: Negative. Cardiovascular: Negative. Gastrointestinal: Negative. Endocrine: Negative. Genitourinary: Negative. Musculoskeletal: Positive for arthralgias, gait problem and joint swelling. Skin: Negative. Allergic/Immunologic: Positive for immunocompromised state. Neurological: Positive for numbness. Hematological: Negative. Psychiatric/Behavioral: Negative. All other systems reviewed and are negative.      Past Medical History:   Diagnosis Date    Arthritis     OSTEO-KNEES    CAD (coronary artery disease) 11/19/2019    X2 STENTS    Diabetes ([facility] [address]     Exposure to 2019 novel coronavirus 5/21/2020    High cholesterol     Hypertension      Current Outpatient Medications   Medication Instructions  amLODIPine (NORVASC) 10 mg, Oral, 7AM    aspirin (ASPIR-81 PO) 81 mg, Oral, 7AM    atorvastatin (LIPITOR) 40 mg, Oral, EVERY BEDTIME    carvediloL (COREG) 12.5 mg, Oral, 2 TIMES DAILY WITH MEALS    cephALEXin (KEFLEX) 250 mg, Oral, 3 TIMES DAILY    glipiZIDE SR (GLUCOTROL XL) 10 mg, Oral, 2 TIMES DAILY    glucose blood VI test strips (Accu-Chek Ada Plus test strp) strip Does Not Apply, SEE ADMIN INSTRUCTIONS    insulin detemir U-100 (LEVEMIR U-100 INSULIN) 61 Units, SubCUTAneous, EVERY BEDTIME    Insulin Needles, Disposable, (BD Ultra-Fine Micro Pen Needle) 32 gauge x 1/4\" ndle Does Not Apply    lisinopriL (PRINIVIL, ZESTRIL) 40 mg, Oral, 7AM    metFORMIN ER (GLUCOPHAGE XR) 750 mg, Oral, DAILY WITH DINNER    senna-docusate (PERICOLACE) 8.6-50 mg per tablet 1 Tab, Oral, 2 TIMES DAILY    ticagrelor (BRILINTA) 90 mg, Oral, 2 TIMES DAILY    ticagrelor (BRILINTA) 90 mg, Oral, 2 TIMES DAILY     Family History   Problem Relation Age of Onset    Cancer Mother         breast, brain    Heart Attack Father     Diabetes Sister     Heart Attack Sister             Cancer Sister         BRAIN TUMOR    No Known Problems Son     Anesth Problems Neg Hx      Social History     Socioeconomic History    Marital status: SINGLE     Spouse name: Not on file    Number of children: Not on file    Years of education: Not on file    Highest education level: Not on file   Occupational History    Not on file   Social Needs    Financial resource strain: Not on file    Food insecurity     Worry: Not on file     Inability: Not on file    Transportation needs     Medical: Not on file     Non-medical: Not on file   Tobacco Use    Smoking status: Former Smoker     Packs/day: 2.00     Years: 40.00     Pack years: 80.00     Quit date:      Years since quittin.3    Smokeless tobacco: Never Used   Substance and Sexual Activity    Alcohol use:  Yes     Alcohol/week: 6.0 - 12.0 standard drinks     Types: 6 - 12 Cans of beer per week     Frequency: Never     Comment: BEER-DAILY TO EVERY 3 DAYS    Drug use: Not Currently    Sexual activity: Not on file   Lifestyle    Physical activity     Days per week: 0 days     Minutes per session: 0 min    Stress: To some extent   Relationships    Social connections     Talks on phone: Not on file     Gets together: Not on file     Attends Scientologist service: Not on file     Active member of club or organization: Not on file     Attends meetings of clubs or organizations: Not on file     Relationship status: Not on file    Intimate partner violence     Fear of current or ex partner: Not on file     Emotionally abused: Not on file     Physically abused: Not on file     Forced sexual activity: Not on file   Other Topics Concern     Service Not Asked    Blood Transfusions Not Asked    Caffeine Concern Not Asked    Occupational Exposure Not Asked    Hobby Hazards Not Asked    Sleep Concern Not Asked    Stress Concern Not Asked    Weight Concern Not Asked    Special Diet Not Asked    Back Care Not Asked    Exercise Not Asked    Bike Helmet Not Asked   [address] Not Asked    Self-Exams Not Asked   Social History Narrative    Not on file     Past Surgical History:   Procedure Laterality Date    HX CAROTID STENT      HX KNEE REPLACEMENT Left 08/03/2020    HX OTHER SURGICAL      oral surgery    [place] CARDIAC SURG PROCEDURE UNLIST  11/2019    placement of stent X2     No Known Allergies     Visit Vitals  BP (!) 168/84 (BP 1 Location: Right upper arm, BP Patient Position: Sitting, BP Cuff Size: Adult)   Pulse 69   Temp 97.1 °F (36.2 °C) (Temporal)   Ht 6' (1.829 m)   Wt 242 lb 9.6 oz (110 kg)   SpO2 99%   BMI 32.90 kg/m²       OBJECTIVE  Physical Exam  Constitutional:       Appearance: Normal appearance. Cardiovascular:      Pulses:           Dorsalis pedis pulses are 2+ on the right side and 2+ on the left side.         Posterior tibial pulses are 2+ on the right side and 2+ on the left side. Musculoskeletal:      Right ankle: He exhibits decreased range of motion. Achilles tendon exhibits no pain and no defect. Left ankle: He exhibits decreased range of motion. Achilles tendon exhibits no pain and no defect. Right lower leg: No edema. Left lower leg: No edema. Right foot: Normal range of motion. No deformity or foot drop. Left foot: Normal range of motion. No deformity or foot drop. Feet:      Right foot:      Protective Sensation: 0 sites tested. 0 sites sensed. Skin integrity: Skin integrity normal.      Toenail Condition: Right toenails are abnormally thick. Fungal disease present. Left foot:      Protective Sensation: 0 sites tested. 0 sites sensed. Skin integrity: Skin integrity normal.      Toenail Condition: Left toenails are abnormally thick. Fungal disease present. Skin:     General: Skin is warm. Capillary Refill: Capillary refill takes 2 to 3 seconds. Findings: No abrasion, signs of injury or wound. Nails: There is no clubbing. Comments: Absent pedal hair growth with hyperpigmentation noted to the skin   Neurological:      Mental Status: He is alert and oriented to person, place, and time. Mental status is at baseline. Sensory: Sensory deficit present. Motor: No weakness or atrophy. Gait: Gait is intact. Comments: Paresthesias/burning noted to bilateral lower extremities   Psychiatric:         Mood and Affect: Mood and affect normal.         Behavior: Behavior is cooperative. Cognition and Memory: Cognition normal.         Judgment: Judgment normal.         ASSESSMENT & PLAN  - Diabetes Mellitus type II, under satisfactory control  - Onychomycosis, toenails 1-5 b/l  - Diabetic peripheral neuropathy        1. Rx changes: none  2. Education: Discussed and educated patient regarding their current medical condition.   Instructed patient to monitor their feet daily, be compliant with all medications and wear supportive shoe gear. 3. Sharp, aseptic onychoreduction performed to all 10 pedal digits with a nail nipper without incident. Pt tolerated well with no dressing needed. 4.  Compliance at present is estimated to be satisfactory. Efforts to improve compliance (if necessary) will be directed at regular blood sugar monitoring: 3 times daily.   5. Follow up: 3 months

## 2021-10-21 ENCOUNTER — OFFICE VISIT (OUTPATIENT)
Dept: PODIATRY | Age: 71
End: 2021-10-21
Payer: MEDICARE

## 2021-10-21 VITALS
WEIGHT: 220 LBS | HEART RATE: 87 BPM | TEMPERATURE: 96.6 F | SYSTOLIC BLOOD PRESSURE: 128 MMHG | BODY MASS INDEX: 29.8 KG/M2 | DIASTOLIC BLOOD PRESSURE: 65 MMHG | HEIGHT: 72 IN | OXYGEN SATURATION: 99 %

## 2021-10-21 DIAGNOSIS — E11.42 TYPE 2 DIABETES MELLITUS WITH DIABETIC POLYNEUROPATHY, WITHOUT LONG-TERM CURRENT USE OF INSULIN (HCC): Primary | ICD-10-CM

## 2021-10-21 DIAGNOSIS — B35.1 ONYCHOMYCOSIS: ICD-10-CM

## 2021-10-21 PROCEDURE — 99213 OFFICE O/P EST LOW 20 MIN: CPT | Performed by: PODIATRIST

## 2021-10-21 PROCEDURE — 3046F HEMOGLOBIN A1C LEVEL >9.0%: CPT | Performed by: PODIATRIST

## 2021-10-21 PROCEDURE — 11721 DEBRIDE NAIL 6 OR MORE: CPT | Performed by: PODIATRIST

## 2021-10-21 PROCEDURE — G8510 SCR DEP NEG, NO PLAN REQD: HCPCS | Performed by: PODIATRIST

## 2021-10-21 PROCEDURE — G8536 NO DOC ELDER MAL SCRN: HCPCS | Performed by: PODIATRIST

## 2021-10-21 PROCEDURE — G8427 DOCREV CUR MEDS BY ELIG CLIN: HCPCS | Performed by: PODIATRIST

## 2021-10-21 PROCEDURE — 1101F PT FALLS ASSESS-DOCD LE1/YR: CPT | Performed by: PODIATRIST

## 2021-10-21 PROCEDURE — 3017F COLORECTAL CA SCREEN DOC REV: CPT | Performed by: PODIATRIST

## 2021-10-21 PROCEDURE — 2022F DILAT RTA XM EVC RTNOPTHY: CPT | Performed by: PODIATRIST

## 2021-10-21 PROCEDURE — G8417 CALC BMI ABV UP PARAM F/U: HCPCS | Performed by: PODIATRIST

## 2021-10-21 NOTE — PROGRESS NOTES
Chief Complaint   Patient presents with    Diabetic Foot Exam     1. Have you been to the ER, urgent care clinic since your last visit? Hospitalized since your last visit? No    2. Have you seen or consulted any other health care providers outside of the [address] [place] [address] since your last visit? Include any pap smears or colon screening.  No  PCP-Dr Kim Martinez

## 2021-11-12 NOTE — PROGRESS NOTES
Haris Lorenzana is a 66 y.o. male who presents for follow-up of Type 2 diabetes mellitus foot care/exam.  Current symptoms/problems include paresthesias of the feet: tolerable and have been unchanged. Symptoms have been present for several years. Pt states he has close f/u with PCP and states he checks his blood sugar regularly. He states his last hemoglobin A1c was 10.4%. He states he has had slight changes in his medications from his cardiologist.  Denies any other changes in his medications. He denies any recent trauma. He denies any local/systemic signs of infection. He denies any other LE complaints. Known diabetic complications: peripheral neuropathy and cardiovascular disease  Cardiovascular risk factors: smoking/ tobacco exposure, dyslipidemia, diabetes mellitus, obesity, sedentary life style, male gender, hypertension  Current diabetic medications include oral agents (triple therapy)  Eye exam current (within one year): unknown  Current exercise: no regular exercise    Home blood sugar records: trend: fluctuating a bit  PCP: Kimberlee Monterroso NP (last visit 08/10/2021)    Review of Systems   Constitutional: Negative. HENT: Negative. Eyes: Negative. Respiratory: Negative. Cardiovascular: Negative. Gastrointestinal: Negative. Endocrine: Negative. Genitourinary: Negative. Musculoskeletal: Positive for arthralgias, gait problem and joint swelling. Skin: Negative. Allergic/Immunologic: Positive for immunocompromised state. Neurological: Positive for numbness. Hematological: Negative. Psychiatric/Behavioral: Negative. All other systems reviewed and are negative.      Past Medical History:   Diagnosis Date    Arthritis     OSTEO-KNEES    CAD (coronary artery disease) 11/19/2019    X2 STENTS    Diabetes ([facility] [address]     Exposure to 2019 novel coronavirus 5/21/2020    High cholesterol     Hypertension      Current Outpatient Medications   Medication Instructions  amLODIPine (NORVASC) 10 mg, Oral, 7AM    aspirin (ASPIR-81 PO) 81 mg, Oral, 7AM    atorvastatin (LIPITOR) 40 mg, Oral, EVERY BEDTIME    carvediloL (COREG) 12.5 mg, Oral, 2 TIMES DAILY WITH MEALS    cephALEXin (KEFLEX) 250 mg, Oral, 3 TIMES DAILY    glipiZIDE SR (GLUCOTROL XL) 10 mg, Oral, 2 TIMES DAILY    glucose blood VI test strips (Accu-Chek Ada Plus test strp) strip Does Not Apply, SEE ADMIN INSTRUCTIONS    insulin detemir U-100 (LEVEMIR U-100 INSULIN) 61 Units, SubCUTAneous, EVERY BEDTIME    Insulin Needles, Disposable, (BD Ultra-Fine Micro Pen Needle) 32 gauge x 1/4\" ndle Does Not Apply    lisinopriL (PRINIVIL, ZESTRIL) 40 mg, Oral, 7AM    metFORMIN ER (GLUCOPHAGE XR) 750 mg, Oral, DAILY WITH DINNER    senna-docusate (PERICOLACE) 8.6-50 mg per tablet 1 Tablet, Oral, 2 TIMES DAILY    ticagrelor (BRILINTA) 90 mg, Oral, 2 TIMES DAILY    ticagrelor (BRILINTA) 90 mg, Oral, 2 TIMES DAILY     Family History   Problem Relation Age of Onset    Cancer Mother         breast, brain    Heart Attack Father     Diabetes Sister     Heart Attack Sister             Cancer Sister         BRAIN TUMOR    No Known Problems Son     Anesth Problems Neg Hx      Social History     Socioeconomic History    Marital status: SINGLE     Spouse name: Not on file    Number of children: Not on file    Years of education: Not on file    Highest education level: Not on file   Occupational History    Not on file   Tobacco Use    Smoking status: Former Smoker     Packs/day: 2.00     Years: 40.00     Pack years: 80.00     Quit date:      Years since quittin.8    Smokeless tobacco: Never Used   Vaping Use    Vaping Use: Never used   Substance and Sexual Activity    Alcohol use:  Yes     Alcohol/week: 6.0 - 12.0 standard drinks     Types: 6 - 12 Cans of beer per week     Comment: BEER-DAILY TO EVERY 3 DAYS    Drug use: Not Currently    Sexual activity: Not on file   Other Topics Concern     Service Not Asked    Blood Transfusions Not Asked    Caffeine Concern Not Asked    Occupational Exposure Not Asked    Hobby Hazards Not Asked    Sleep Concern Not Asked    Stress Concern Not Asked    Weight Concern Not Asked    Special Diet Not Asked    Back Care Not Asked    Exercise Not Asked    Bike Helmet Not Asked   [address] Not Asked    Self-Exams Not Asked   Social History Narrative    Not on file     Social Determinants of Health     Financial Resource Strain:     Difficulty of Paying Living Expenses: Not on file   Food Insecurity:     Worried About Running Out of Food in the Last Year: Not on file    Jade of Food in the Last Year: Not on file   Transportation Needs:     Lack of Transportation (Medical): Not on file    Lack of Transportation (Non-Medical):  Not on file   Physical Activity:     Days of Exercise per Week: Not on file    Minutes of Exercise per Session: Not on file   Stress:     Feeling of Stress : Not on file   Social Connections:     Frequency of Communication with Friends and Family: Not on file    Frequency of Social Gatherings with Friends and Family: Not on file    Attends Catholic Services: Not on file    Active Member of [address] [place] [address] or Organizations: Not on file    Attends Club or Organization Meetings: Not on file    Marital Status: Not on file   Intimate Partner Violence:     Fear of Current or Ex-Partner: Not on file    Emotionally Abused: Not on file    Physically Abused: Not on file    Sexually Abused: Not on file   Housing Stability:     Unable to Pay for Housing in the Last Year: Not on file    Number of [place] in the Last Year: Not on file    Unstable Housing in the Last Year: Not on file     Past Surgical History:   Procedure Laterality Date    HX CAROTID STENT      HX KNEE REPLACEMENT Left 08/03/2020    HX OTHER SURGICAL      oral surgery    CT CARDIAC SURG PROCEDURE UNLIST  11/2019    placement of stent X2     No Known Allergies Visit Vitals  /65 (BP 1 Location: Left upper arm, BP Patient Position: Sitting, BP Cuff Size: Large adult)   Pulse 87   Temp (!) 96.6 °F (35.9 °C) (Temporal)   Ht 6' (1.829 m)   Wt 220 lb (99.8 kg)   SpO2 99%   BMI 29.84 kg/m²       OBJECTIVE  Physical Exam  Constitutional:       Appearance: Normal appearance. Cardiovascular:      Pulses:           Dorsalis pedis pulses are 2+ on the right side and 2+ on the left side. Posterior tibial pulses are 2+ on the right side and 2+ on the left side. Musculoskeletal:      Right lower leg: No edema. Left lower leg: No edema. Right ankle: Decreased range of motion. Right Achilles Tendon: No defects. Left ankle: Decreased range of motion. Left Achilles Tendon: No defects. Right foot: Normal range of motion. No deformity or foot drop. Left foot: Normal range of motion. No deformity or foot drop. Feet:      Right foot:      Protective Sensation: 0 sites tested. 0 sites sensed. Skin integrity: Skin integrity normal.      Toenail Condition: Right toenails are abnormally thick. Fungal disease present. Left foot:      Protective Sensation: 0 sites tested. 0 sites sensed. Skin integrity: Skin integrity normal.      Toenail Condition: Left toenails are abnormally thick. Fungal disease present. Lymphadenopathy:      Lower Body: No right inguinal adenopathy. No left inguinal adenopathy. Skin:     General: Skin is warm. Capillary Refill: Capillary refill takes 2 to 3 seconds. Findings: No abrasion, signs of injury or wound. Nails: There is no clubbing. Comments: Absent pedal hair growth with hyperpigmentation noted to the skin   Neurological:      Mental Status: He is alert and oriented to person, place, and time. Mental status is at baseline. Sensory: Sensory deficit present. Motor: No weakness or atrophy. Gait: Gait is intact.       Comments: Paresthesias/burning noted to bilateral lower extremities   Psychiatric:         Mood and Affect: Mood and affect normal.         Behavior: Behavior is cooperative. Cognition and Memory: Cognition normal.         Judgment: Judgment normal.         ASSESSMENT & PLAN    ICD-10-CM ICD-9-CM    1. Type 2 diabetes mellitus with diabetic polyneuropathy, without long-term current use of insulin ([facility])  E11.42 250.60      357.2    2. Onychomycosis  B35.1 110.1            1. Rx changes: none  2. Education: Discussed and educated patient regarding their current medical condition. Instructed patient to monitor their feet daily, be compliant with all medications and wear supportive shoe gear. 3. Sharp, aseptic onychoreduction performed to all 10 pedal digits with a nail nipper without incident. Pt tolerated well with no dressing needed. 4.  Compliance at present is estimated to be satisfactory. Efforts to improve compliance (if necessary) will be directed at regular blood sugar monitoring: 3 times daily.   5. Follow up: 3 months

## 2022-02-01 ENCOUNTER — OFFICE VISIT (OUTPATIENT)
Dept: PODIATRY | Age: 72
End: 2022-02-01
Payer: MEDICARE

## 2022-02-01 VITALS
DIASTOLIC BLOOD PRESSURE: 76 MMHG | TEMPERATURE: 97.7 F | BODY MASS INDEX: 29.8 KG/M2 | WEIGHT: 220 LBS | HEIGHT: 72 IN | HEART RATE: 81 BPM | SYSTOLIC BLOOD PRESSURE: 134 MMHG

## 2022-02-01 DIAGNOSIS — E11.42 TYPE 2 DIABETES MELLITUS WITH DIABETIC POLYNEUROPATHY, WITHOUT LONG-TERM CURRENT USE OF INSULIN (HCC): Primary | ICD-10-CM

## 2022-02-01 DIAGNOSIS — B35.1 ONYCHOMYCOSIS: ICD-10-CM

## 2022-02-01 PROCEDURE — G8417 CALC BMI ABV UP PARAM F/U: HCPCS | Performed by: PODIATRIST

## 2022-02-01 PROCEDURE — G8427 DOCREV CUR MEDS BY ELIG CLIN: HCPCS | Performed by: PODIATRIST

## 2022-02-01 PROCEDURE — 2022F DILAT RTA XM EVC RTNOPTHY: CPT | Performed by: PODIATRIST

## 2022-02-01 PROCEDURE — 99213 OFFICE O/P EST LOW 20 MIN: CPT | Performed by: PODIATRIST

## 2022-02-01 PROCEDURE — G8432 DEP SCR NOT DOC, RNG: HCPCS | Performed by: PODIATRIST

## 2022-02-01 PROCEDURE — G8536 NO DOC ELDER MAL SCRN: HCPCS | Performed by: PODIATRIST

## 2022-02-01 PROCEDURE — 1101F PT FALLS ASSESS-DOCD LE1/YR: CPT | Performed by: PODIATRIST

## 2022-02-01 PROCEDURE — 3017F COLORECTAL CA SCREEN DOC REV: CPT | Performed by: PODIATRIST

## 2022-02-01 PROCEDURE — 3046F HEMOGLOBIN A1C LEVEL >9.0%: CPT | Performed by: PODIATRIST

## 2022-02-01 NOTE — PROGRESS NOTES
1. Have you been to the ER, urgent care clinic since your last visit? Hospitalized since your last visit? No    2. Have you seen or consulted any other health care providers outside of the [address] [place] [address] since your last visit? Include any pap smears or colon screening.  No     Chief Complaint   Patient presents with    Diabetic Foot Exam     glucose today 142; next pcp visit 02/18/2022

## 2022-03-18 PROBLEM — E11.40 DIABETIC NEUROPATHY (HCC): Status: ACTIVE | Noted: 2020-06-25

## 2022-03-18 PROBLEM — M17.12 PRIMARY OSTEOARTHRITIS OF LEFT KNEE: Status: ACTIVE | Noted: 2020-08-03

## 2022-03-19 PROBLEM — Z20.822 EXPOSURE TO 2019 NOVEL CORONAVIRUS: Status: ACTIVE | Noted: 2020-05-21

## 2022-03-19 PROBLEM — M17.11 ARTHRITIS OF KNEE, RIGHT: Status: ACTIVE | Noted: 2020-10-29

## 2022-03-19 PROBLEM — B35.1 ONYCHOMYCOSIS: Status: ACTIVE | Noted: 2020-10-26

## 2022-10-03 ENCOUNTER — OFFICE VISIT (OUTPATIENT)
Dept: PODIATRY | Age: 72
End: 2022-10-03
Payer: MEDICARE

## 2022-10-03 VITALS
BODY MASS INDEX: 29.93 KG/M2 | TEMPERATURE: 97.3 F | HEART RATE: 76 BPM | DIASTOLIC BLOOD PRESSURE: 72 MMHG | HEIGHT: 72 IN | SYSTOLIC BLOOD PRESSURE: 140 MMHG | WEIGHT: 221 LBS

## 2022-10-03 DIAGNOSIS — E11.42 TYPE 2 DIABETES MELLITUS WITH DIABETIC POLYNEUROPATHY, WITHOUT LONG-TERM CURRENT USE OF INSULIN (HCC): Primary | ICD-10-CM

## 2022-10-03 DIAGNOSIS — B35.1 ONYCHOMYCOSIS: ICD-10-CM

## 2022-10-03 PROCEDURE — G8432 DEP SCR NOT DOC, RNG: HCPCS | Performed by: PODIATRIST

## 2022-10-03 PROCEDURE — 3017F COLORECTAL CA SCREEN DOC REV: CPT | Performed by: PODIATRIST

## 2022-10-03 PROCEDURE — G8417 CALC BMI ABV UP PARAM F/U: HCPCS | Performed by: PODIATRIST

## 2022-10-03 PROCEDURE — 2022F DILAT RTA XM EVC RTNOPTHY: CPT | Performed by: PODIATRIST

## 2022-10-03 PROCEDURE — G8536 NO DOC ELDER MAL SCRN: HCPCS | Performed by: PODIATRIST

## 2022-10-03 PROCEDURE — G8427 DOCREV CUR MEDS BY ELIG CLIN: HCPCS | Performed by: PODIATRIST

## 2022-10-03 PROCEDURE — 1123F ACP DISCUSS/DSCN MKR DOCD: CPT | Performed by: PODIATRIST

## 2022-10-03 PROCEDURE — 99213 OFFICE O/P EST LOW 20 MIN: CPT | Performed by: PODIATRIST

## 2022-10-03 PROCEDURE — 3046F HEMOGLOBIN A1C LEVEL >9.0%: CPT | Performed by: PODIATRIST

## 2022-10-03 PROCEDURE — 1101F PT FALLS ASSESS-DOCD LE1/YR: CPT | Performed by: PODIATRIST

## 2022-10-03 NOTE — PROGRESS NOTES
Lashawn Davila is a 67 y.o. male who presents for follow-up of Type 2 diabetes mellitus foot care/exam.  Current symptoms/problems include paresthesias of the feet: tolerable and have been unchanged. Symptoms have been present for several years. Pt states he has close f/u with PCP and states he checks his blood sugar regularly. He states his last hemoglobin A1c was 7.5% (drastic decrease from his previous value of 10.4%). Denies any changes in his medications. He denies any recent trauma. He denies any local/systemic signs of infection. He denies any other LE complaints. Known diabetic complications: peripheral neuropathy and cardiovascular disease  Cardiovascular risk factors: smoking/ tobacco exposure, dyslipidemia, diabetes mellitus, obesity, sedentary life style, male gender, hypertension  Current diabetic medications include oral agents (triple therapy)  Eye exam current (within one year): unknown  Current exercise: no regular exercise    Home blood sugar records: trend: fluctuating a bit  PCP: Latasha Dye NP (last visit 05/10/2022)      Review of Systems   Constitutional: Negative. HENT: Negative. Eyes: Negative. Respiratory: Negative. Cardiovascular: Negative. Gastrointestinal: Negative. Endocrine: Negative. Genitourinary: Negative. Musculoskeletal:  Positive for arthralgias, gait problem and joint swelling. Skin: Negative. Allergic/Immunologic: Positive for immunocompromised state. Neurological:  Positive for numbness. Hematological: Negative. Psychiatric/Behavioral: Negative. All other systems reviewed and are negative.      Past Medical History:   Diagnosis Date    Arthritis     OSTEO-KNEES    CAD (coronary artery disease) 11/19/2019    X2 STENTS    Diabetes ([facility] [address]     Exposure to 2019 novel coronavirus 5/21/2020    High cholesterol     Hypertension      Current Outpatient Medications   Medication Instructions    amLODIPine (NORVASC) 10 mg, Oral, 7AM    aspirin (ASPIR-81 PO) 81 mg, Oral, 7AM    atorvastatin (LIPITOR) 40 mg, Oral, EVERY BEDTIME    carvediloL (COREG) 12.5 mg, Oral, 2 TIMES DAILY WITH MEALS    cephALEXin (KEFLEX) 250 mg, Oral, 3 TIMES DAILY    glipiZIDE SR (GLUCOTROL XL) 10 mg, Oral, 2 TIMES DAILY    glucose blood VI test strips (Accu-Chek Ada Plus test strp) strip Does Not Apply, SEE ADMIN INSTRUCTIONS    insulin detemir U-100 (LEVEMIR U-100 INSULIN) 61 Units, SubCUTAneous, EVERY BEDTIME    Insulin Needles, Disposable, (BD Ultra-Fine Micro Pen Needle) 32 gauge x 1/\" ndle Does Not Apply    lisinopriL (PRINIVIL, ZESTRIL) 40 mg, Oral, 7AM    metFORMIN ER (GLUCOPHAGE XR) 750 mg, Oral, DAILY WITH DINNER    senna-docusate (PERICOLACE) 8.6-50 mg per tablet 1 Tablet, Oral, 2 TIMES DAILY    ticagrelor (BRILINTA) 90 mg, Oral, 2 TIMES DAILY    ticagrelor (BRILINTA) 90 mg, Oral, 2 TIMES DAILY     Family History   Problem Relation Age of Onset    Cancer Mother         breast, brain    Heart Attack Father     Diabetes Sister     Heart Attack Sister             Cancer Sister         BRAIN TUMOR    No Known Problems Son     Anesth Problems Neg Hx      Social History     Socioeconomic History    Marital status: SINGLE     Spouse name: Not on file    Number of children: Not on file    Years of education: Not on file    Highest education level: Not on file   Occupational History    Not on file   Tobacco Use    Smoking status: Former     Packs/day: 2.00     Years: 40.00     Pack years: 80.00     Types: Cigarettes     Quit date:      Years since quitting: 10.7    Smokeless tobacco: Never   Vaping Use    Vaping Use: Never used   Substance and Sexual Activity    Alcohol use:  Yes     Alcohol/week: 6.0 - 12.0 standard drinks     Types: 6 - 12 Cans of beer per week     Comment: BEER-DAILY TO EVERY 3 DAYS    Drug use: Not Currently    Sexual activity: Not on file   Other Topics Concern     Service Not Asked    Blood Transfusions Not Asked Caffeine Concern Not Asked    Occupational Exposure Not Asked    [address] Hazards Not Asked    Sleep Concern Not Asked    Stress Concern Not Asked    Weight Concern Not Asked    Special Diet Not Asked    Back Care Not Asked    Exercise Not Asked    Bike Helmet Not Asked    Seat Belt Not Asked    Self-Exams Not Asked   Social History Narrative    Not on file     Social Determinants of Health     Financial Resource Strain: Not on file   Food Insecurity: Not on file   Transportation Needs: Not on file   Physical Activity: Not on file   Stress: Not on file   Social Connections: Not on file   Intimate Partner Violence: Not on file   Housing Stability: Not on file     Past Surgical History:   Procedure Laterality Date    HX CAROTID STENT      HX KNEE REPLACEMENT Left 08/03/2020    HX OTHER SURGICAL      oral surgery    [place] CARDIAC SURG PROCEDURE UNLIST  11/2019    placement of stent X2     No Known Allergies     Visit Vitals  BP (!) 140/72 (BP 1 Location: Right upper arm, BP Patient Position: Sitting, BP Cuff Size: Large adult)   Pulse 76   Temp 97.3 °F (36.3 °C) (Temporal)   Ht 6' (1.829 m)   Wt 221 lb (100.2 kg)   BMI 29.97 kg/m²       OBJECTIVE  Physical Exam  Constitutional:       Appearance: Normal appearance. Cardiovascular:      Pulses:           Dorsalis pedis pulses are 2+ on the right side and 2+ on the left side. Posterior tibial pulses are 2+ on the right side and 2+ on the left side. Musculoskeletal:      Right lower leg: No edema. Left lower leg: No edema. Right ankle: Decreased range of motion. Right Achilles Tendon: No defects. Left ankle: Decreased range of motion. Left Achilles Tendon: No defects. Right foot: Normal range of motion. No deformity or foot drop. Left foot: Normal range of motion. No deformity or foot drop. Feet:      Right foot:      Protective Sensation: 0 sites tested. 0 sites sensed.       Skin integrity: Skin integrity normal.      Toenail Condition: Right toenails are abnormally thick. Fungal disease present. Left foot:      Protective Sensation: 0 sites tested. 0 sites sensed. Skin integrity: Skin integrity normal.      Toenail Condition: Left toenails are abnormally thick. Fungal disease present. Lymphadenopathy:      Lower Body: No right inguinal adenopathy. No left inguinal adenopathy. Skin:     General: Skin is warm. Capillary Refill: Capillary refill takes 2 to 3 seconds. Findings: No abrasion, signs of injury or wound. Nails: There is no clubbing. Comments: Absent pedal hair growth with hyperpigmentation noted to the skin   Neurological:      Mental Status: He is alert and oriented to person, place, and time. Mental status is at baseline. Sensory: Sensory deficit present. Motor: No weakness or atrophy. Gait: Gait is intact. Comments: Paresthesias/burning noted to bilateral lower extremities   Psychiatric:         Mood and Affect: Mood and affect normal.         Behavior: Behavior is cooperative. Cognition and Memory: Cognition normal.         Judgment: Judgment normal.       ASSESSMENT & PLAN    ICD-10-CM ICD-9-CM    1. Type 2 diabetes mellitus with diabetic polyneuropathy, without long-term current use of insulin ([facility])  E11.42 250.60      357.2       2. Onychomycosis  B35.1 110.1               1.  Rx changes: none  2. Education: Discussed and educated patient regarding their current medical condition at it pertains to his diabetes and his thick/discolored toenails. Instructed patient to monitor their feet daily for possible wound formation, be compliant with all medications and wear supportive shoe gear for wound prevention. Reviewed and discussed most recent lab work, specifically as it pertains to his diabetes. Pt verbalized understanding of all discussed and reviewed  3. Compliance at present is estimated to be satisfactory.  Efforts to improve compliance (if necessary) will be directed at regular blood sugar monitoring: 3 times daily. 4. Follow up: 3 months          Micky Brandt, [facility], [address] [place] [address] and Demetrius [address] [place] [address]  [address] [facility] [address], [address]  O: [phone]  F: [phone]  C: [phone]

## 2023-01-12 ENCOUNTER — OFFICE VISIT (OUTPATIENT)
Dept: INTERNAL MEDICINE CLINIC | Age: 73
End: 2023-01-12
Payer: MEDICARE

## 2023-01-12 VITALS
HEART RATE: 82 BPM | WEIGHT: 220 LBS | TEMPERATURE: 97.7 F | SYSTOLIC BLOOD PRESSURE: 124 MMHG | OXYGEN SATURATION: 96 % | RESPIRATION RATE: 18 BRPM | DIASTOLIC BLOOD PRESSURE: 75 MMHG | HEIGHT: 72 IN | BODY MASS INDEX: 29.8 KG/M2

## 2023-01-12 DIAGNOSIS — E78.5 HYPERLIPIDEMIA, UNSPECIFIED HYPERLIPIDEMIA TYPE: ICD-10-CM

## 2023-01-12 DIAGNOSIS — I25.10 CORONARY ARTERY DISEASE INVOLVING NATIVE HEART, UNSPECIFIED VESSEL OR LESION TYPE, UNSPECIFIED WHETHER ANGINA PRESENT: ICD-10-CM

## 2023-01-12 DIAGNOSIS — I10 ESSENTIAL HYPERTENSION: Primary | ICD-10-CM

## 2023-01-12 DIAGNOSIS — Z95.5 HISTORY OF HEART ARTERY STENT: ICD-10-CM

## 2023-01-12 DIAGNOSIS — E11.42 TYPE 2 DIABETES MELLITUS WITH DIABETIC POLYNEUROPATHY, WITHOUT LONG-TERM CURRENT USE OF INSULIN (HCC): ICD-10-CM

## 2023-01-12 DIAGNOSIS — Z11.59 NEED FOR HEPATITIS C SCREENING TEST: ICD-10-CM

## 2023-01-12 LAB — HBA1C MFR BLD HPLC: 7.3 %

## 2023-01-12 PROCEDURE — 1101F PT FALLS ASSESS-DOCD LE1/YR: CPT | Performed by: STUDENT IN AN ORGANIZED HEALTH CARE EDUCATION/TRAINING PROGRAM

## 2023-01-12 PROCEDURE — G8427 DOCREV CUR MEDS BY ELIG CLIN: HCPCS | Performed by: STUDENT IN AN ORGANIZED HEALTH CARE EDUCATION/TRAINING PROGRAM

## 2023-01-12 PROCEDURE — 83036 HEMOGLOBIN GLYCOSYLATED A1C: CPT | Performed by: STUDENT IN AN ORGANIZED HEALTH CARE EDUCATION/TRAINING PROGRAM

## 2023-01-12 PROCEDURE — G8536 NO DOC ELDER MAL SCRN: HCPCS | Performed by: STUDENT IN AN ORGANIZED HEALTH CARE EDUCATION/TRAINING PROGRAM

## 2023-01-12 PROCEDURE — G8417 CALC BMI ABV UP PARAM F/U: HCPCS | Performed by: STUDENT IN AN ORGANIZED HEALTH CARE EDUCATION/TRAINING PROGRAM

## 2023-01-12 PROCEDURE — G8510 SCR DEP NEG, NO PLAN REQD: HCPCS | Performed by: STUDENT IN AN ORGANIZED HEALTH CARE EDUCATION/TRAINING PROGRAM

## 2023-01-12 PROCEDURE — 2022F DILAT RTA XM EVC RTNOPTHY: CPT | Performed by: STUDENT IN AN ORGANIZED HEALTH CARE EDUCATION/TRAINING PROGRAM

## 2023-01-12 PROCEDURE — 99204 OFFICE O/P NEW MOD 45 MIN: CPT | Performed by: STUDENT IN AN ORGANIZED HEALTH CARE EDUCATION/TRAINING PROGRAM

## 2023-01-12 PROCEDURE — 3017F COLORECTAL CA SCREEN DOC REV: CPT | Performed by: STUDENT IN AN ORGANIZED HEALTH CARE EDUCATION/TRAINING PROGRAM

## 2023-01-12 RX ORDER — LANCING DEVICE
EACH MISCELLANEOUS
COMMUNITY

## 2023-01-12 RX ORDER — LOSARTAN POTASSIUM 25 MG/1
25 TABLET ORAL DAILY
COMMUNITY
Start: 2022-12-31

## 2023-01-12 RX ORDER — ORAL SEMAGLUTIDE 14 MG/1
14 TABLET ORAL DAILY
COMMUNITY
Start: 2022-12-13

## 2023-01-12 RX ORDER — LANCETS
EACH MISCELLANEOUS
Qty: 300 EACH | Refills: 2 | Status: SHIPPED | OUTPATIENT
Start: 2023-01-12

## 2023-01-12 RX ORDER — METFORMIN HYDROCHLORIDE 500 MG/1
1000 TABLET ORAL 2 TIMES DAILY WITH MEALS
COMMUNITY

## 2023-01-12 RX ORDER — METFORMIN HYDROCHLORIDE 500 MG/1
500 TABLET ORAL 4 TIMES DAILY
COMMUNITY
End: 2023-01-12

## 2023-01-12 RX ORDER — FUROSEMIDE 20 MG/1
20 TABLET ORAL DAILY
COMMUNITY
Start: 2022-12-21

## 2023-01-12 NOTE — PROGRESS NOTES
1. \"Have you been to the ER, urgent care clinic since your last visit? Hospitalized since your last visit? \" No    2. \"Have you seen or consulted any other health care providers outside of the [address] [place] [address] since your last visit? \" No     3. For patients aged 39-70: Has the patient had a colonoscopy / FIT/ Cologuard? Yes - Care Gap present. Most recent result on file      If the patient is female:    4. For patients aged 41-77: Has the patient had a mammogram within the past 2 years? NA - based on age or sex      11. For patients aged 21-65: Has the patient had a pap smear?  NA - based on age or sex    Chief Complaint   Patient presents with    New Patient    Diabetes    Arthritis    Hypertension    Cholesterol Problem     Visit Vitals  BP (!) 152/75 (BP 1 Location: Right upper arm, BP Patient Position: Sitting, BP Cuff Size: Adult)   Pulse 82   Temp 97.7 °F (36.5 °C) (Oral)   Resp 18   Ht 6' (1.829 m)   Wt 220 lb (99.8 kg)   SpO2 96%   BMI 29.84 kg/m²

## 2023-01-12 NOTE — PROGRESS NOTES
Nitish Sandoval (: 1950) is a 67 y.o. male, new patient, here for evaluation of the following chief complaint(s):  New Patient, Diabetes, Arthritis, Hypertension, and Cholesterol Problem     Previous PCP: Beth Osman     ASSESSMENT/PLAN:  Below is the assessment and plan developed based on review of pertinent history, physical exam, labs, studies, and medications. 1. Essential hypertension  -     METABOLIC PANEL, COMPREHENSIVE  -     CBC W/O DIFF  2. Type 2 diabetes mellitus with diabetic polyneuropathy, without long-term current use of insulin (HCC)  -     MICROALBUMIN, UR, RAND W/ MICROALB/CREAT RATIO  -     lancets (Accu-Chek Softclix Lancets) misc; Check blood sugars daily in the morning before breakfast, Normal, Disp-300 Each, R-2  -     AMB POC HEMOGLOBIN A1C  -     insulin detemir U-100 (LEVEMIR FLEXTOUCH) 100 unit/mL (3 mL) inpn; 33 Units by SubCUTAneous route two (2) times a day., Normal, Disp-10 Adjustable Dose Pre-filled Pen Syringe, R-3  3. Hyperlipidemia, unspecified hyperlipidemia type  -     LIPID PANEL  4. Need for hepatitis C screening test  -     HEPATITIS C AB  5. Coronary artery disease involving native heart, unspecified vessel or lesion type, unspecified whether angina present  6. History of heart artery stent    New patient    Diabetes: HBA1C 7.3 today, will increase Levemir to 66 units, but recommend to split dose to 33 units bid for better absorption. Continue monitoring blood sugars     Continue meds for hypertension and hyperlipidemia. Follow up with cardiology, was told to ask if he still needs to take both aspirin and brilinta. Return in about 3 months (around 2023) for follow up with one of the other providers due to maternity leave . SUBJECTIVE/OBJECTIVE:  HPI    Nitish Sandoval is a 76-year-old who presents to clinic to establish care.     He has a hx of CAD s/p stents in 2019, has been having some sob with exertion, he is scheduled for a stress test next week. Unsure why he is still on both aspirin and brilinta. He states he hadn't seen a cardiologist since 2020. He has a hx of diabetes, Checks -FBS -140s. 2 hours post dinner - 140-160s. No low blood sugars. He is on levemir 61 units daily, rybelsus and metformin. Needs to make an eye appointment      No Known Allergies  Current Outpatient Medications   Medication Sig    losartan (COZAAR) 25 mg tablet Take 25 mg by mouth daily. furosemide (LASIX) 20 mg tablet Take 20 mg by mouth daily. Rybelsus 14 mg tablet Take 14 mg by mouth daily. Lancing Device with Lancets (Accu-Chek Soft Dev Lancets) kit by Does Not Apply route. Check blood sugar three times a day    metFORMIN (GLUCOPHAGE) 500 mg tablet Take 1,000 mg by mouth two (2) times daily (with meals). lancets (Accu-Chek Softclix Lancets) misc Check blood sugars daily in the morning before breakfast    insulin detemir U-100 (LEVEMIR FLEXTOUCH) 100 unit/mL (3 mL) inpn 33 Units by SubCUTAneous route two (2) times a day. ticagrelor (BRILINTA) 90 mg tablet Take 90 mg by mouth two (2) times a day. aspirin (ASPIR-81 PO) Take 81 mg by mouth every morning. carvediloL (COREG) 12.5 mg tablet Take 12.5 mg by mouth two (2) times daily (with meals). glucose blood VI test strips (Accu-Chek Ada Plus test strp) strip by Does Not Apply route See Admin Instructions. amLODIPine (NORVASC) 10 mg tablet Take 10 mg by mouth every morning. atorvastatin (LIPITOR) 40 mg tablet Take 40 mg by mouth nightly. Insulin Needles, Disposable, 32 gauge x 1/4\" ndle by Does Not Apply route. No current facility-administered medications for this visit.      Past Medical History:   Diagnosis Date    Arthritis     OSTEO-KNEES    CAD (coronary artery disease) 11/19/2019    X2 STENTS    Diabetes ([facility] [address]     Exposure to 2019 novel coronavirus 5/21/2020    High cholesterol     Hypertension      Past Surgical History:   Procedure Laterality Date    HX CAROTID STENT      HX KNEE REPLACEMENT Left 2020    HX OTHER SURGICAL      oral surgery    [place] UNLISTED PROCEDURE CARDIAC SURGERY  2019    placement of stent X2     Family History   Problem Relation Age of Onset    Cancer Mother         breast, brain    Heart Attack Father     Diabetes Sister     Heart Attack Sister             Cancer Sister         BRAIN TUMOR    No Known Problems Son     Anesth Problems Neg Hx      Social History     Tobacco Use   Smoking Status Former    Packs/day: 2.00    Years: 40.00    Pack years: 80.00    Types: Cigarettes    Quit date:     Years since quittin.1   Smokeless Tobacco Never         Review of Systems   Constitutional:  Negative for fever and malaise/fatigue. HENT:  Negative for congestion, hearing loss and sore throat. Eyes: Negative. Respiratory:  Negative for cough and shortness of breath. Cardiovascular:  Negative for chest pain, palpitations and leg swelling. Gastrointestinal:  Negative for abdominal pain, nausea and vomiting. Genitourinary: Negative. Musculoskeletal:  Negative for joint pain. Skin: Negative. Neurological:  Negative for focal weakness, loss of consciousness and headaches. Psychiatric/Behavioral: Negative. Vitals:    23 1542 23 1600   BP: (!) 152/75 124/75   Pulse: 82    Resp: 18    Temp: 97.7 °F (36.5 °C)    TempSrc: Oral    SpO2: 96%    Weight: 220 lb (99.8 kg)    Height: 6' (1.829 m)       Physical Exam  Constitutional:       General: He is not in acute distress. Appearance: Normal appearance. HENT:      Head: Normocephalic. Nose: Nose normal.      Mouth/Throat:      Mouth: Mucous membranes are moist.   Eyes:      Extraocular Movements: Extraocular movements intact. Conjunctiva/sclera: Conjunctivae normal.      Pupils: Pupils are equal, round, and reactive to light. Cardiovascular:      Rate and Rhythm: Normal rate and regular rhythm. Heart sounds: Normal heart sounds. Pulmonary:      Effort: Pulmonary effort is normal.      Breath sounds: Normal breath sounds. Abdominal:      General: Bowel sounds are normal. There is no distension. Palpations: Abdomen is soft. Tenderness: There is no abdominal tenderness. Musculoskeletal:         General: No swelling or tenderness. Cervical back: Normal range of motion and neck supple. Skin:     General: Skin is warm and dry. Neurological:      General: No focal deficit present. Mental Status: He is alert. Mental status is at baseline. Motor: No weakness. Psychiatric:         Mood and Affect: Mood normal.         Behavior: Behavior normal.               An electronic signature was used to authenticate this note.   -- Josseline Stewart MD

## 2023-01-14 LAB
ALBUMIN SERPL-MCNC: 4.2 G/DL (ref 3.7–4.7)
ALBUMIN/CREAT UR: 15 MG/G CREAT (ref 0–29)
ALBUMIN/GLOB SERPL: 1.4 {RATIO} (ref 1.2–2.2)
ALP SERPL-CCNC: 91 IU/L (ref 44–121)
ALT SERPL-CCNC: 14 IU/L (ref 0–44)
AST SERPL-CCNC: 16 IU/L (ref 0–40)
BILIRUB SERPL-MCNC: 0.8 MG/DL (ref 0–1.2)
BUN SERPL-MCNC: 19 MG/DL (ref 8–27)
BUN/CREAT SERPL: 20 (ref 10–24)
CALCIUM SERPL-MCNC: 8.9 MG/DL (ref 8.6–10.2)
CHLORIDE SERPL-SCNC: 101 MMOL/L (ref 96–106)
CHOLEST SERPL-MCNC: 118 MG/DL (ref 100–199)
CO2 SERPL-SCNC: 23 MMOL/L (ref 20–29)
CREAT SERPL-MCNC: 0.96 MG/DL (ref 0.76–1.27)
CREAT UR-MCNC: 97.2 MG/DL
EGFR: 83 ML/MIN/1.73
ERYTHROCYTE [DISTWIDTH] IN BLOOD BY AUTOMATED COUNT: 13.1 % (ref 11.6–15.4)
GLOBULIN SER CALC-MCNC: 3 G/DL (ref 1.5–4.5)
GLUCOSE SERPL-MCNC: 135 MG/DL (ref 70–99)
HCT VFR BLD AUTO: 37.3 % (ref 37.5–51)
HCV AB S/CO SERPL IA: <0.1 S/CO RATIO (ref 0–0.9)
HDLC SERPL-MCNC: 24 MG/DL
HGB BLD-MCNC: 12.3 G/DL (ref 13–17.7)
LDLC SERPL CALC-MCNC: 70 MG/DL (ref 0–99)
MCH RBC QN AUTO: 27.6 PG (ref 26.6–33)
MCHC RBC AUTO-ENTMCNC: 33 G/DL (ref 31.5–35.7)
MCV RBC AUTO: 84 FL (ref 79–97)
MICROALBUMIN UR-MCNC: 14.8 UG/ML
PLATELET # BLD AUTO: 245 X10E3/UL (ref 150–450)
POTASSIUM SERPL-SCNC: 4.3 MMOL/L (ref 3.5–5.2)
PROT SERPL-MCNC: 7.2 G/DL (ref 6–8.5)
RBC # BLD AUTO: 4.46 X10E6/UL (ref 4.14–5.8)
SODIUM SERPL-SCNC: 142 MMOL/L (ref 134–144)
TRIGL SERPL-MCNC: 131 MG/DL (ref 0–149)
VLDLC SERPL CALC-MCNC: 24 MG/DL (ref 5–40)
WBC # BLD AUTO: 8.2 X10E3/UL (ref 3.4–10.8)

## 2023-01-25 NOTE — PROGRESS NOTES
Your hemoglobin is a little below normal, But stable from your labs 2 days ago. Have you ever gotten a colonoscopy done. If not we can place an order for this. Continue taking your diabetic medications.   Your other blood work is normal.

## 2023-02-06 PROBLEM — I25.10 CORONARY ARTERY DISEASE INVOLVING NATIVE HEART, UNSPECIFIED VESSEL OR LESION TYPE, UNSPECIFIED WHETHER ANGINA PRESENT: Status: ACTIVE | Noted: 2023-02-06

## 2023-02-06 PROBLEM — Z95.5 HISTORY OF HEART ARTERY STENT: Status: ACTIVE | Noted: 2023-02-06

## 2023-03-01 ENCOUNTER — TELEPHONE (OUTPATIENT)
Dept: INTERNAL MEDICINE CLINIC | Age: 73
End: 2023-03-01

## 2023-03-02 ENCOUNTER — TELEPHONE (OUTPATIENT)
Dept: INTERNAL MEDICINE CLINIC | Age: 73
End: 2023-03-02

## 2023-03-02 NOTE — TELEPHONE ENCOUNTER
----- Message from Yoli Holcomb sent at 3/1/2023  2:22 PM EST -----  Subject: Message to Provider    QUESTIONS  Information for Provider? Patient is currently waiting for his PCP to   approve/locate alternative insulin due to his current brand being out of   stock at his pharmacy. He would also like to know what are his next steps   if the alternative meds are not called in today as he is currently out of   insulin.   ---------------------------------------------------------------------------  --------------  Zora Bosworth INFO  [phone]; OK to leave message on voicemail  ---------------------------------------------------------------------------  --------------  SCRIPT ANSWERS  Relationship to Patient?  Self

## 2023-03-27 DIAGNOSIS — E11.42 TYPE 2 DIABETES MELLITUS WITH DIABETIC POLYNEUROPATHY, WITHOUT LONG-TERM CURRENT USE OF INSULIN (HCC): ICD-10-CM

## 2023-03-28 DIAGNOSIS — E11.42 TYPE 2 DIABETES MELLITUS WITH DIABETIC POLYNEUROPATHY, WITHOUT LONG-TERM CURRENT USE OF INSULIN (HCC): ICD-10-CM

## 2023-04-04 RX ORDER — INSULIN DETEMIR 100 [IU]/ML
INJECTION, SOLUTION SUBCUTANEOUS
Refills: 1
Start: 2023-04-04

## 2023-04-04 RX ORDER — INSULIN DETEMIR 100 [IU]/ML
INJECTION, SOLUTION SUBCUTANEOUS
Qty: 15 ML | Refills: 1 | Status: SHIPPED
Start: 2023-04-04

## 2023-04-12 PROBLEM — Q80.9 XERODERMIA: Status: ACTIVE | Noted: 2023-04-12

## 2023-04-12 PROBLEM — I48.19 PERSISTENT ATRIAL FIBRILLATION (HCC): Status: ACTIVE | Noted: 2023-04-12

## 2023-04-12 PROBLEM — I10 ESSENTIAL HYPERTENSION: Status: ACTIVE | Noted: 2023-04-12

## 2023-04-12 PROBLEM — E78.5 HYPERLIPIDEMIA, UNSPECIFIED HYPERLIPIDEMIA TYPE: Status: ACTIVE | Noted: 2023-04-12

## 2023-04-28 DIAGNOSIS — E11.42 TYPE 2 DIABETES MELLITUS WITH DIABETIC POLYNEUROPATHY, WITHOUT LONG-TERM CURRENT USE OF INSULIN (HCC): ICD-10-CM

## 2023-04-28 RX ORDER — INSULIN DETEMIR 100 [IU]/ML
INJECTION, SOLUTION SUBCUTANEOUS
Qty: 10 PEN | Refills: 0 | Status: SHIPPED | OUTPATIENT
Start: 2023-04-28

## 2023-04-30 RX ORDER — PEN NEEDLE, DIABETIC 31 GX3/16"
100 NEEDLE, DISPOSABLE MISCELLANEOUS 2 TIMES DAILY
Qty: 100 PEN NEEDLE | Refills: 5 | Status: SHIPPED | OUTPATIENT
Start: 2023-04-30

## 2023-06-14 DIAGNOSIS — E11.42 TYPE 2 DIABETES MELLITUS WITH DIABETIC POLYNEUROPATHY (HCC): ICD-10-CM

## 2023-06-15 RX ORDER — INSULIN DETEMIR 100 [IU]/ML
INJECTION, SOLUTION SUBCUTANEOUS
Qty: 5 ADJUSTABLE DOSE PRE-FILLED PEN SYRINGE | Refills: 2 | Status: SHIPPED | OUTPATIENT
Start: 2023-06-15 | End: 2023-07-12

## 2023-06-26 ENCOUNTER — OFFICE VISIT (OUTPATIENT)
Age: 73
End: 2023-06-26
Payer: MEDICARE

## 2023-06-26 VITALS
DIASTOLIC BLOOD PRESSURE: 62 MMHG | BODY MASS INDEX: 29.8 KG/M2 | TEMPERATURE: 98.6 F | SYSTOLIC BLOOD PRESSURE: 127 MMHG | HEIGHT: 72 IN | WEIGHT: 220 LBS | HEART RATE: 72 BPM

## 2023-06-26 DIAGNOSIS — B35.1 ONYCHOMYCOSIS: Primary | ICD-10-CM

## 2023-06-26 DIAGNOSIS — E11.42 DIABETIC SENSORIMOTOR NEUROPATHY (HCC): ICD-10-CM

## 2023-06-26 PROCEDURE — 99213 OFFICE O/P EST LOW 20 MIN: CPT | Performed by: PODIATRIST

## 2023-06-26 PROCEDURE — 2022F DILAT RTA XM EVC RTNOPTHY: CPT | Performed by: PODIATRIST

## 2023-06-26 PROCEDURE — G8427 DOCREV CUR MEDS BY ELIG CLIN: HCPCS | Performed by: PODIATRIST

## 2023-06-26 PROCEDURE — 3046F HEMOGLOBIN A1C LEVEL >9.0%: CPT | Performed by: PODIATRIST

## 2023-06-26 PROCEDURE — 3074F SYST BP LT 130 MM HG: CPT | Performed by: PODIATRIST

## 2023-06-26 PROCEDURE — 11721 DEBRIDE NAIL 6 OR MORE: CPT | Performed by: PODIATRIST

## 2023-06-26 PROCEDURE — 3078F DIAST BP <80 MM HG: CPT | Performed by: PODIATRIST

## 2023-06-26 PROCEDURE — 1036F TOBACCO NON-USER: CPT | Performed by: PODIATRIST

## 2023-06-26 PROCEDURE — 1123F ACP DISCUSS/DSCN MKR DOCD: CPT | Performed by: PODIATRIST

## 2023-06-26 PROCEDURE — G8419 CALC BMI OUT NRM PARAM NOF/U: HCPCS | Performed by: PODIATRIST

## 2023-06-26 PROCEDURE — 3017F COLORECTAL CA SCREEN DOC REV: CPT | Performed by: PODIATRIST

## 2023-06-26 ASSESSMENT — ENCOUNTER SYMPTOMS
SHORTNESS OF BREATH: 0
VOMITING: 0
DIARRHEA: 0
ABDOMINAL PAIN: 0

## 2023-07-12 DIAGNOSIS — E11.42 TYPE 2 DIABETES MELLITUS WITH DIABETIC POLYNEUROPATHY (HCC): ICD-10-CM

## 2023-07-12 RX ORDER — INSULIN DETEMIR 100 [IU]/ML
INJECTION, SOLUTION SUBCUTANEOUS
Qty: 10 ADJUSTABLE DOSE PRE-FILLED PEN SYRINGE | Refills: 2 | Status: SHIPPED | OUTPATIENT
Start: 2023-07-12 | End: 2023-08-22

## 2023-07-17 ENCOUNTER — OFFICE VISIT (OUTPATIENT)
Facility: CLINIC | Age: 73
End: 2023-07-17
Payer: MEDICARE

## 2023-07-17 ENCOUNTER — TELEPHONE (OUTPATIENT)
Facility: CLINIC | Age: 73
End: 2023-07-17

## 2023-07-17 VITALS
WEIGHT: 222.4 LBS | SYSTOLIC BLOOD PRESSURE: 132 MMHG | BODY MASS INDEX: 30.12 KG/M2 | OXYGEN SATURATION: 97 % | DIASTOLIC BLOOD PRESSURE: 66 MMHG | HEIGHT: 72 IN | HEART RATE: 80 BPM | TEMPERATURE: 98 F

## 2023-07-17 DIAGNOSIS — L81.9 HYPERPIGMENTATION OF SKIN: ICD-10-CM

## 2023-07-17 DIAGNOSIS — D64.9 ANEMIA, UNSPECIFIED TYPE: ICD-10-CM

## 2023-07-17 DIAGNOSIS — I10 ESSENTIAL HYPERTENSION: ICD-10-CM

## 2023-07-17 DIAGNOSIS — I48.19 PERSISTENT ATRIAL FIBRILLATION (HCC): ICD-10-CM

## 2023-07-17 DIAGNOSIS — Z95.5 HISTORY OF HEART ARTERY STENT: ICD-10-CM

## 2023-07-17 DIAGNOSIS — Z79.4 TYPE 2 DIABETES MELLITUS WITH OTHER SPECIFIED COMPLICATION, WITH LONG-TERM CURRENT USE OF INSULIN (HCC): Primary | ICD-10-CM

## 2023-07-17 DIAGNOSIS — E78.5 HYPERLIPIDEMIA, UNSPECIFIED HYPERLIPIDEMIA TYPE: ICD-10-CM

## 2023-07-17 DIAGNOSIS — L57.0 ACTINIC KERATOSIS: ICD-10-CM

## 2023-07-17 DIAGNOSIS — E11.69 TYPE 2 DIABETES MELLITUS WITH OTHER SPECIFIED COMPLICATION, WITH LONG-TERM CURRENT USE OF INSULIN (HCC): Primary | ICD-10-CM

## 2023-07-17 DIAGNOSIS — I25.10 CORONARY ARTERY DISEASE INVOLVING NATIVE HEART, UNSPECIFIED VESSEL OR LESION TYPE, UNSPECIFIED WHETHER ANGINA PRESENT: ICD-10-CM

## 2023-07-17 LAB — HBA1C MFR BLD: 6 %

## 2023-07-17 PROCEDURE — G8417 CALC BMI ABV UP PARAM F/U: HCPCS | Performed by: STUDENT IN AN ORGANIZED HEALTH CARE EDUCATION/TRAINING PROGRAM

## 2023-07-17 PROCEDURE — 83036 HEMOGLOBIN GLYCOSYLATED A1C: CPT | Performed by: STUDENT IN AN ORGANIZED HEALTH CARE EDUCATION/TRAINING PROGRAM

## 2023-07-17 PROCEDURE — 2022F DILAT RTA XM EVC RTNOPTHY: CPT | Performed by: STUDENT IN AN ORGANIZED HEALTH CARE EDUCATION/TRAINING PROGRAM

## 2023-07-17 PROCEDURE — 1123F ACP DISCUSS/DSCN MKR DOCD: CPT | Performed by: STUDENT IN AN ORGANIZED HEALTH CARE EDUCATION/TRAINING PROGRAM

## 2023-07-17 PROCEDURE — G8427 DOCREV CUR MEDS BY ELIG CLIN: HCPCS | Performed by: STUDENT IN AN ORGANIZED HEALTH CARE EDUCATION/TRAINING PROGRAM

## 2023-07-17 PROCEDURE — 3075F SYST BP GE 130 - 139MM HG: CPT | Performed by: STUDENT IN AN ORGANIZED HEALTH CARE EDUCATION/TRAINING PROGRAM

## 2023-07-17 PROCEDURE — 1036F TOBACCO NON-USER: CPT | Performed by: STUDENT IN AN ORGANIZED HEALTH CARE EDUCATION/TRAINING PROGRAM

## 2023-07-17 PROCEDURE — 3046F HEMOGLOBIN A1C LEVEL >9.0%: CPT | Performed by: STUDENT IN AN ORGANIZED HEALTH CARE EDUCATION/TRAINING PROGRAM

## 2023-07-17 PROCEDURE — 99214 OFFICE O/P EST MOD 30 MIN: CPT | Performed by: STUDENT IN AN ORGANIZED HEALTH CARE EDUCATION/TRAINING PROGRAM

## 2023-07-17 PROCEDURE — 3078F DIAST BP <80 MM HG: CPT | Performed by: STUDENT IN AN ORGANIZED HEALTH CARE EDUCATION/TRAINING PROGRAM

## 2023-07-17 PROCEDURE — 3017F COLORECTAL CA SCREEN DOC REV: CPT | Performed by: STUDENT IN AN ORGANIZED HEALTH CARE EDUCATION/TRAINING PROGRAM

## 2023-07-17 RX ORDER — FUROSEMIDE 20 MG/1
20 TABLET ORAL DAILY
Qty: 90 TABLET | Refills: 0 | Status: SHIPPED | OUTPATIENT
Start: 2023-07-17 | End: 2023-10-15

## 2023-07-17 RX ORDER — ASPIRIN 81 MG/1
81 TABLET ORAL DAILY
COMMUNITY
End: 2023-07-17 | Stop reason: SDUPTHER

## 2023-07-17 RX ORDER — ASPIRIN 81 MG/1
81 TABLET ORAL DAILY
Qty: 30 TABLET | Refills: 2 | Status: SHIPPED | OUTPATIENT
Start: 2023-07-17

## 2023-07-17 RX ORDER — LANCETS 30 GAUGE
EACH MISCELLANEOUS
Qty: 200 EACH | Refills: 3 | Status: SHIPPED | OUTPATIENT
Start: 2023-07-17

## 2023-07-17 RX ORDER — LOSARTAN POTASSIUM 25 MG/1
25 TABLET ORAL DAILY
Qty: 90 TABLET | Refills: 1 | Status: SHIPPED | OUTPATIENT
Start: 2023-07-17 | End: 2024-01-13

## 2023-07-17 RX ORDER — ORAL SEMAGLUTIDE 14 MG/1
14 TABLET ORAL DAILY
Qty: 90 TABLET | Refills: 1 | Status: SHIPPED | OUTPATIENT
Start: 2023-07-17

## 2023-07-17 RX ORDER — ATORVASTATIN CALCIUM 40 MG/1
40 TABLET, FILM COATED ORAL NIGHTLY
Qty: 90 TABLET | Refills: 1 | Status: SHIPPED | OUTPATIENT
Start: 2023-07-17 | End: 2024-01-13

## 2023-07-17 RX ORDER — AMLODIPINE BESYLATE 10 MG/1
10 TABLET ORAL DAILY
Qty: 90 TABLET | Refills: 1 | Status: SHIPPED | OUTPATIENT
Start: 2023-07-17 | End: 2024-01-13

## 2023-07-17 RX ORDER — APIXABAN 5 MG/1
5 TABLET, FILM COATED ORAL 2 TIMES DAILY
COMMUNITY
Start: 2023-06-03

## 2023-07-17 RX ORDER — CARVEDILOL 12.5 MG/1
12.5 TABLET ORAL 2 TIMES DAILY WITH MEALS
Qty: 180 TABLET | Refills: 0 | Status: SHIPPED | OUTPATIENT
Start: 2023-07-17 | End: 2023-10-15

## 2023-07-17 SDOH — ECONOMIC STABILITY: FOOD INSECURITY: WITHIN THE PAST 12 MONTHS, THE FOOD YOU BOUGHT JUST DIDN'T LAST AND YOU DIDN'T HAVE MONEY TO GET MORE.: NEVER TRUE

## 2023-07-17 SDOH — ECONOMIC STABILITY: FOOD INSECURITY: WITHIN THE PAST 12 MONTHS, YOU WORRIED THAT YOUR FOOD WOULD RUN OUT BEFORE YOU GOT MONEY TO BUY MORE.: NEVER TRUE

## 2023-07-17 SDOH — ECONOMIC STABILITY: HOUSING INSECURITY
IN THE LAST 12 MONTHS, WAS THERE A TIME WHEN YOU DID NOT HAVE A STEADY PLACE TO SLEEP OR SLEPT IN A SHELTER (INCLUDING NOW)?: NO

## 2023-07-17 SDOH — ECONOMIC STABILITY: INCOME INSECURITY: HOW HARD IS IT FOR YOU TO PAY FOR THE VERY BASICS LIKE FOOD, HOUSING, MEDICAL CARE, AND HEATING?: NOT HARD AT ALL

## 2023-07-17 ASSESSMENT — ENCOUNTER SYMPTOMS
SORE THROAT: 0
DIARRHEA: 0
SHORTNESS OF BREATH: 1
ABDOMINAL PAIN: 0
CONSTIPATION: 0
FACIAL SWELLING: 0
COUGH: 0

## 2023-07-17 NOTE — PROGRESS NOTES
Sammi Choudhary (: 1950) is a 67 y.o. male, Established patient patient, here for evaluation of the following chief complaint(s):  Follow-up Chronic Condition       ASSESSMENT/PLAN:  Below is the assessment and plan developed based on review of pertinent history, physical exam, labs, studies, and medications. 1. Essential hypertension  -     losartan (COZAAR) 25 MG tablet; Take 1 tablet by mouth daily, Disp-90 tablet, R-1Normal  -     carvedilol (COREG) 12.5 MG tablet; Take 1 tablet by mouth 2 times daily (with meals), Disp-180 tablet, R-0Normal  -     amLODIPine (NORVASC) 10 MG tablet; Take 1 tablet by mouth daily, Disp-90 tablet, R-1Normal  -     CBC; Future  -     Comprehensive Metabolic Panel; Future  2. Persistent atrial fibrillation (HCC)  -     carvedilol (COREG) 12.5 MG tablet; Take 1 tablet by mouth 2 times daily (with meals), Disp-180 tablet, R-0Normal  3. Coronary artery disease involving native heart, unspecified vessel or lesion type, unspecified whether angina present  -     furosemide (LASIX) 20 MG tablet; Take 1 tablet by mouth daily, Disp-90 tablet, R-0Normal  -     aspirin 81 MG EC tablet; Take 1 tablet by mouth daily, Disp-30 tablet, R-2Normal  4. History of heart artery stent  5. Hyperlipidemia, unspecified hyperlipidemia type  -     atorvastatin (LIPITOR) 40 MG tablet; Take 1 tablet by mouth nightly, Disp-90 tablet, R-1Normal  6. Type 2 diabetes mellitus with other specified complication, with long-term current use of insulin (HCC)  -     Semaglutide (RYBELSUS) 14 MG TABS; Take 14 mg by mouth daily, Disp-90 tablet, R-1Normal  -     metFORMIN (GLUCOPHAGE) 500 MG tablet; Take 2 tablets by mouth 2 times daily (with meals), Disp-360 tablet, R-0Normal  -     Lancets Ultra Thin 30G MISC; Disp-200 each, R-3, NormalUse to check glucose 4 times a day before meals and at bedtime  -     Amb External Referral To Ophthalmology  7. Actinic keratosis  -     Amb External Referral To Dermatology  8.

## 2023-07-18 LAB
ALBUMIN SERPL-MCNC: 4.1 G/DL (ref 3.8–4.8)
ALBUMIN/GLOB SERPL: 1.3 {RATIO} (ref 1.2–2.2)
ALP SERPL-CCNC: 88 IU/L (ref 44–121)
ALT SERPL-CCNC: 11 IU/L (ref 0–44)
AST SERPL-CCNC: 18 IU/L (ref 0–40)
BILIRUB SERPL-MCNC: 0.8 MG/DL (ref 0–1.2)
BUN SERPL-MCNC: 10 MG/DL (ref 8–27)
BUN/CREAT SERPL: 11 (ref 10–24)
CALCIUM SERPL-MCNC: 8.5 MG/DL (ref 8.6–10.2)
CHLORIDE SERPL-SCNC: 101 MMOL/L (ref 96–106)
CO2 SERPL-SCNC: 26 MMOL/L (ref 20–29)
CREAT SERPL-MCNC: 0.87 MG/DL (ref 0.76–1.27)
EGFRCR SERPLBLD CKD-EPI 2021: 91 ML/MIN/1.73
ERYTHROCYTE [DISTWIDTH] IN BLOOD BY AUTOMATED COUNT: 14.4 % (ref 11.6–15.4)
GLOBULIN SER CALC-MCNC: 3.2 G/DL (ref 1.5–4.5)
GLUCOSE SERPL-MCNC: 94 MG/DL (ref 70–99)
HCT VFR BLD AUTO: 35.9 % (ref 37.5–51)
HGB BLD-MCNC: 11.5 G/DL (ref 13–17.7)
MCH RBC QN AUTO: 26.1 PG (ref 26.6–33)
MCHC RBC AUTO-ENTMCNC: 32 G/DL (ref 31.5–35.7)
MCV RBC AUTO: 81 FL (ref 79–97)
PLATELET # BLD AUTO: 225 X10E3/UL (ref 150–450)
POTASSIUM SERPL-SCNC: 4 MMOL/L (ref 3.5–5.2)
PROT SERPL-MCNC: 7.3 G/DL (ref 6–8.5)
RBC # BLD AUTO: 4.41 X10E6/UL (ref 4.14–5.8)
SODIUM SERPL-SCNC: 142 MMOL/L (ref 134–144)
WBC # BLD AUTO: 8.4 X10E3/UL (ref 3.4–10.8)

## 2023-07-25 DIAGNOSIS — I10 ESSENTIAL HYPERTENSION: ICD-10-CM

## 2023-07-25 DIAGNOSIS — I48.19 PERSISTENT ATRIAL FIBRILLATION (HCC): ICD-10-CM

## 2023-07-25 RX ORDER — CARVEDILOL 12.5 MG/1
TABLET ORAL
Qty: 180 TABLET | Refills: 0 | OUTPATIENT
Start: 2023-07-25

## 2023-08-20 DIAGNOSIS — E11.42 TYPE 2 DIABETES MELLITUS WITH DIABETIC POLYNEUROPATHY (HCC): ICD-10-CM

## 2023-08-20 DIAGNOSIS — I10 ESSENTIAL HYPERTENSION: ICD-10-CM

## 2023-08-20 DIAGNOSIS — I48.19 PERSISTENT ATRIAL FIBRILLATION (HCC): ICD-10-CM

## 2023-08-22 RX ORDER — INSULIN DETEMIR 100 [IU]/ML
INJECTION, SOLUTION SUBCUTANEOUS
Qty: 5 ADJUSTABLE DOSE PRE-FILLED PEN SYRINGE | Refills: 2 | Status: SHIPPED | OUTPATIENT
Start: 2023-08-22

## 2023-08-22 RX ORDER — CARVEDILOL 12.5 MG/1
TABLET ORAL
Qty: 180 TABLET | Refills: 0 | Status: SHIPPED | OUTPATIENT
Start: 2023-08-22

## 2023-08-24 ENCOUNTER — TELEPHONE (OUTPATIENT)
Facility: CLINIC | Age: 73
End: 2023-08-24

## 2023-08-24 DIAGNOSIS — Z79.4 TYPE 2 DIABETES MELLITUS WITH OTHER SPECIFIED COMPLICATION, WITH LONG-TERM CURRENT USE OF INSULIN (HCC): ICD-10-CM

## 2023-08-24 DIAGNOSIS — E11.69 TYPE 2 DIABETES MELLITUS WITH OTHER SPECIFIED COMPLICATION, WITH LONG-TERM CURRENT USE OF INSULIN (HCC): ICD-10-CM

## 2023-08-24 NOTE — TELEPHONE ENCOUNTER
Patient is requesting Metformin 500 mg to [facility] but he stated that he got a text stating it was too soon to fill his prescription. He is completely out.

## 2023-09-25 ENCOUNTER — OFFICE VISIT (OUTPATIENT)
Age: 73
End: 2023-09-25
Payer: MEDICARE

## 2023-09-25 VITALS
WEIGHT: 222 LBS | OXYGEN SATURATION: 98 % | HEIGHT: 72 IN | BODY MASS INDEX: 30.07 KG/M2 | RESPIRATION RATE: 18 BRPM | HEART RATE: 60 BPM

## 2023-09-25 DIAGNOSIS — E11.42 DIABETIC SENSORIMOTOR NEUROPATHY (HCC): ICD-10-CM

## 2023-09-25 DIAGNOSIS — B35.1 ONYCHOMYCOSIS: Primary | ICD-10-CM

## 2023-09-25 PROCEDURE — G8417 CALC BMI ABV UP PARAM F/U: HCPCS | Performed by: PODIATRIST

## 2023-09-25 PROCEDURE — 3046F HEMOGLOBIN A1C LEVEL >9.0%: CPT | Performed by: PODIATRIST

## 2023-09-25 PROCEDURE — 2022F DILAT RTA XM EVC RTNOPTHY: CPT | Performed by: PODIATRIST

## 2023-09-25 PROCEDURE — 1036F TOBACCO NON-USER: CPT | Performed by: PODIATRIST

## 2023-09-25 PROCEDURE — 3017F COLORECTAL CA SCREEN DOC REV: CPT | Performed by: PODIATRIST

## 2023-09-25 PROCEDURE — G8428 CUR MEDS NOT DOCUMENT: HCPCS | Performed by: PODIATRIST

## 2023-09-25 PROCEDURE — 99213 OFFICE O/P EST LOW 20 MIN: CPT | Performed by: PODIATRIST

## 2023-09-25 PROCEDURE — 11721 DEBRIDE NAIL 6 OR MORE: CPT | Performed by: PODIATRIST

## 2023-09-25 PROCEDURE — 1123F ACP DISCUSS/DSCN MKR DOCD: CPT | Performed by: PODIATRIST

## 2023-10-09 ASSESSMENT — ENCOUNTER SYMPTOMS
VOMITING: 0
ABDOMINAL PAIN: 0
DIARRHEA: 0
SHORTNESS OF BREATH: 0

## 2023-10-10 NOTE — PROGRESS NOTES
[facility] PODIATRY & FOOT SURGERY       Patient Name: Cameron Carrizales    : 1950    Visit Date: 2023    Office Visit Note    Subjective:         Patient is a 75 y.o. male who is being seen as a follow up with a history of diabetes with a chief complain of painful elongated toenails. Patient primary care physician is Raúl Burdick MD. Patient states the last office visit with PCP was 23. Patient describes diabetes as being under control. Patient's last hemoglobin A1c was 6.0. Patient denies any overt pedal pain. Patient denies any recent pedal trauma. Patient denies any systemic signs of infection but does state nails are thick and discolored. No other lower extremity complaints at this time. Past Medical History:   Diagnosis Date    Arthritis     OSTEO-KNEES    CAD (coronary artery disease) 11/19/2019    X2 STENTS    Diabetes ([facility])     Exposure to 2019 novel coronavirus 2020    High cholesterol     Hypertension      Past Surgical History:   Procedure Laterality Date    CAROTID STENT PLACEMENT      OTHER SURGICAL HISTORY      oral surgery    [place] UNLISTED PROCEDURE CARDIAC SURGERY  2019    placement of stent X2    TOTAL KNEE ARTHROPLASTY Left 2020       Family History   Problem Relation Age of Onset    Diabetes Sister     Heart Attack Father     Cancer Mother         breast, brain    Heart Attack Sister             No Known Problems Son     Cancer Sister         BRAIN TUMOR    Anesth Problems Neg Hx       Social History     Tobacco Use    Smoking status: Former     Packs/day: 2.00     Years: 40.00     Additional pack years: 0.00     Total pack years: 80.00     Types: Cigarettes     Quit date: 2012     Years since quittin.7    Smokeless tobacco: Never   Substance Use Topics    Alcohol use:  Yes     Alcohol/week: 6.0 - 12.0 standard drinks of alcohol     No Known Allergies  Prior to Admission medications    Medication Sig Start Date End

## 2023-10-27 DIAGNOSIS — I48.19 PERSISTENT ATRIAL FIBRILLATION (HCC): ICD-10-CM

## 2023-10-27 DIAGNOSIS — I25.10 CORONARY ARTERY DISEASE INVOLVING NATIVE HEART, UNSPECIFIED VESSEL OR LESION TYPE, UNSPECIFIED WHETHER ANGINA PRESENT: ICD-10-CM

## 2023-10-27 DIAGNOSIS — I10 ESSENTIAL HYPERTENSION: ICD-10-CM

## 2023-10-30 RX ORDER — CARVEDILOL 12.5 MG/1
12.5 TABLET ORAL 2 TIMES DAILY WITH MEALS
Qty: 180 TABLET | Refills: 0 | Status: SHIPPED | OUTPATIENT
Start: 2023-10-30

## 2023-10-30 RX ORDER — FUROSEMIDE 20 MG/1
20 TABLET ORAL DAILY
Qty: 90 TABLET | Refills: 0 | Status: SHIPPED | OUTPATIENT
Start: 2023-10-30

## 2023-11-12 DIAGNOSIS — E78.5 HYPERLIPIDEMIA, UNSPECIFIED HYPERLIPIDEMIA TYPE: ICD-10-CM

## 2023-11-13 RX ORDER — ATORVASTATIN CALCIUM 40 MG/1
40 TABLET, FILM COATED ORAL
Qty: 90 TABLET | Refills: 1 | Status: SHIPPED | OUTPATIENT
Start: 2023-11-13

## 2023-12-15 DIAGNOSIS — E11.42 TYPE 2 DIABETES MELLITUS WITH DIABETIC POLYNEUROPATHY (HCC): ICD-10-CM

## 2023-12-15 RX ORDER — INSULIN DETEMIR 100 [IU]/ML
INJECTION, SOLUTION SUBCUTANEOUS
Qty: 4 ADJUSTABLE DOSE PRE-FILLED PEN SYRINGE | Refills: 2 | Status: SHIPPED | OUTPATIENT
Start: 2023-12-15

## 2024-01-04 DIAGNOSIS — I10 ESSENTIAL HYPERTENSION: ICD-10-CM

## 2024-01-04 DIAGNOSIS — I25.10 CORONARY ARTERY DISEASE INVOLVING NATIVE HEART, UNSPECIFIED VESSEL OR LESION TYPE, UNSPECIFIED WHETHER ANGINA PRESENT: ICD-10-CM

## 2024-01-06 RX ORDER — LOSARTAN POTASSIUM 25 MG/1
25 TABLET ORAL DAILY
Qty: 60 TABLET | Refills: 0 | Status: SHIPPED | OUTPATIENT
Start: 2024-01-06

## 2024-01-06 RX ORDER — FUROSEMIDE 20 MG/1
20 TABLET ORAL DAILY
Qty: 60 TABLET | Refills: 0 | Status: SHIPPED | OUTPATIENT
Start: 2024-01-06

## 2024-01-06 RX ORDER — AMLODIPINE BESYLATE 10 MG/1
10 TABLET ORAL DAILY
Qty: 60 TABLET | Refills: 1 | Status: SHIPPED | OUTPATIENT
Start: 2024-01-06

## 2024-02-19 ENCOUNTER — OFFICE VISIT (OUTPATIENT)
Facility: CLINIC | Age: 74
End: 2024-02-19
Payer: MEDICARE

## 2024-02-19 ENCOUNTER — OFFICE VISIT (OUTPATIENT)
Age: 74
End: 2024-02-19
Payer: MEDICARE

## 2024-02-19 VITALS
BODY MASS INDEX: 30.07 KG/M2 | HEIGHT: 72 IN | SYSTOLIC BLOOD PRESSURE: 132 MMHG | OXYGEN SATURATION: 95 % | WEIGHT: 222 LBS | DIASTOLIC BLOOD PRESSURE: 61 MMHG | HEART RATE: 68 BPM | TEMPERATURE: 98.4 F

## 2024-02-19 VITALS
HEIGHT: 71 IN | TEMPERATURE: 98.4 F | RESPIRATION RATE: 18 BRPM | DIASTOLIC BLOOD PRESSURE: 67 MMHG | BODY MASS INDEX: 30.49 KG/M2 | HEART RATE: 67 BPM | SYSTOLIC BLOOD PRESSURE: 137 MMHG | WEIGHT: 217.8 LBS | OXYGEN SATURATION: 96 %

## 2024-02-19 DIAGNOSIS — Z71.89 ADVANCE CARE PLANNING: ICD-10-CM

## 2024-02-19 DIAGNOSIS — I48.19 PERSISTENT ATRIAL FIBRILLATION (HCC): ICD-10-CM

## 2024-02-19 DIAGNOSIS — E11.42 TYPE 2 DIABETES MELLITUS WITH DIABETIC POLYNEUROPATHY, WITH LONG-TERM CURRENT USE OF INSULIN (HCC): ICD-10-CM

## 2024-02-19 DIAGNOSIS — Z79.4 TYPE 2 DIABETES MELLITUS WITH DIABETIC POLYNEUROPATHY, WITH LONG-TERM CURRENT USE OF INSULIN (HCC): ICD-10-CM

## 2024-02-19 DIAGNOSIS — L57.0 ACTINIC KERATOSIS: ICD-10-CM

## 2024-02-19 DIAGNOSIS — Z12.2 SCREENING FOR LUNG CANCER: ICD-10-CM

## 2024-02-19 DIAGNOSIS — I10 ESSENTIAL HYPERTENSION: ICD-10-CM

## 2024-02-19 DIAGNOSIS — L81.9 HYPERPIGMENTATION OF SKIN: ICD-10-CM

## 2024-02-19 DIAGNOSIS — E11.42 DIABETIC SENSORIMOTOR NEUROPATHY (HCC): ICD-10-CM

## 2024-02-19 DIAGNOSIS — B35.1 ONYCHOMYCOSIS: Primary | ICD-10-CM

## 2024-02-19 DIAGNOSIS — Z87.891 PERSONAL HISTORY OF TOBACCO USE: ICD-10-CM

## 2024-02-19 DIAGNOSIS — Z00.00 MEDICARE ANNUAL WELLNESS VISIT, SUBSEQUENT: Primary | ICD-10-CM

## 2024-02-19 LAB — HBA1C MFR BLD: 5.3 %

## 2024-02-19 PROCEDURE — 3046F HEMOGLOBIN A1C LEVEL >9.0%: CPT | Performed by: PODIATRIST

## 2024-02-19 PROCEDURE — 99213 OFFICE O/P EST LOW 20 MIN: CPT | Performed by: PODIATRIST

## 2024-02-19 PROCEDURE — 1123F ACP DISCUSS/DSCN MKR DOCD: CPT | Performed by: PODIATRIST

## 2024-02-19 PROCEDURE — 1036F TOBACCO NON-USER: CPT | Performed by: STUDENT IN AN ORGANIZED HEALTH CARE EDUCATION/TRAINING PROGRAM

## 2024-02-19 PROCEDURE — 3075F SYST BP GE 130 - 139MM HG: CPT | Performed by: PODIATRIST

## 2024-02-19 PROCEDURE — G8427 DOCREV CUR MEDS BY ELIG CLIN: HCPCS | Performed by: STUDENT IN AN ORGANIZED HEALTH CARE EDUCATION/TRAINING PROGRAM

## 2024-02-19 PROCEDURE — 99214 OFFICE O/P EST MOD 30 MIN: CPT | Performed by: STUDENT IN AN ORGANIZED HEALTH CARE EDUCATION/TRAINING PROGRAM

## 2024-02-19 PROCEDURE — 1123F ACP DISCUSS/DSCN MKR DOCD: CPT | Performed by: STUDENT IN AN ORGANIZED HEALTH CARE EDUCATION/TRAINING PROGRAM

## 2024-02-19 PROCEDURE — 3017F COLORECTAL CA SCREEN DOC REV: CPT | Performed by: STUDENT IN AN ORGANIZED HEALTH CARE EDUCATION/TRAINING PROGRAM

## 2024-02-19 PROCEDURE — 3017F COLORECTAL CA SCREEN DOC REV: CPT | Performed by: PODIATRIST

## 2024-02-19 PROCEDURE — G8427 DOCREV CUR MEDS BY ELIG CLIN: HCPCS | Performed by: PODIATRIST

## 2024-02-19 PROCEDURE — 1036F TOBACCO NON-USER: CPT | Performed by: PODIATRIST

## 2024-02-19 PROCEDURE — 83036 HEMOGLOBIN GLYCOSYLATED A1C: CPT | Performed by: STUDENT IN AN ORGANIZED HEALTH CARE EDUCATION/TRAINING PROGRAM

## 2024-02-19 PROCEDURE — G8417 CALC BMI ABV UP PARAM F/U: HCPCS | Performed by: STUDENT IN AN ORGANIZED HEALTH CARE EDUCATION/TRAINING PROGRAM

## 2024-02-19 PROCEDURE — 2022F DILAT RTA XM EVC RTNOPTHY: CPT | Performed by: PODIATRIST

## 2024-02-19 PROCEDURE — 3078F DIAST BP <80 MM HG: CPT | Performed by: PODIATRIST

## 2024-02-19 PROCEDURE — G8484 FLU IMMUNIZE NO ADMIN: HCPCS | Performed by: PODIATRIST

## 2024-02-19 PROCEDURE — G0439 PPPS, SUBSEQ VISIT: HCPCS | Performed by: STUDENT IN AN ORGANIZED HEALTH CARE EDUCATION/TRAINING PROGRAM

## 2024-02-19 PROCEDURE — G8484 FLU IMMUNIZE NO ADMIN: HCPCS | Performed by: STUDENT IN AN ORGANIZED HEALTH CARE EDUCATION/TRAINING PROGRAM

## 2024-02-19 PROCEDURE — 11721 DEBRIDE NAIL 6 OR MORE: CPT | Performed by: PODIATRIST

## 2024-02-19 PROCEDURE — 2022F DILAT RTA XM EVC RTNOPTHY: CPT | Performed by: STUDENT IN AN ORGANIZED HEALTH CARE EDUCATION/TRAINING PROGRAM

## 2024-02-19 PROCEDURE — G0296 VISIT TO DETERM LDCT ELIG: HCPCS | Performed by: STUDENT IN AN ORGANIZED HEALTH CARE EDUCATION/TRAINING PROGRAM

## 2024-02-19 PROCEDURE — 3078F DIAST BP <80 MM HG: CPT | Performed by: STUDENT IN AN ORGANIZED HEALTH CARE EDUCATION/TRAINING PROGRAM

## 2024-02-19 PROCEDURE — 3075F SYST BP GE 130 - 139MM HG: CPT | Performed by: STUDENT IN AN ORGANIZED HEALTH CARE EDUCATION/TRAINING PROGRAM

## 2024-02-19 PROCEDURE — G8417 CALC BMI ABV UP PARAM F/U: HCPCS | Performed by: PODIATRIST

## 2024-02-19 PROCEDURE — 3046F HEMOGLOBIN A1C LEVEL >9.0%: CPT | Performed by: STUDENT IN AN ORGANIZED HEALTH CARE EDUCATION/TRAINING PROGRAM

## 2024-02-19 RX ORDER — PEN NEEDLE, DIABETIC 32GX 5/32"
NEEDLE, DISPOSABLE MISCELLANEOUS
COMMUNITY
Start: 2023-12-30

## 2024-02-19 RX ORDER — AMIODARONE HYDROCHLORIDE 200 MG/1
200 TABLET ORAL DAILY
COMMUNITY
Start: 2024-01-16

## 2024-02-19 RX ORDER — AMMONIUM LACTATE 12 G/100G
CREAM TOPICAL
COMMUNITY

## 2024-02-19 ASSESSMENT — LIFESTYLE VARIABLES
HAS A RELATIVE, FRIEND, DOCTOR, OR ANOTHER HEALTH PROFESSIONAL EXPRESSED CONCERN ABOUT YOUR DRINKING OR SUGGESTED YOU CUT DOWN: 0
HOW OFTEN DO YOU HAVE A DRINK CONTAINING ALCOHOL: 4 OR MORE TIMES A WEEK
HOW OFTEN DURING THE LAST YEAR HAVE YOU FOUND THAT YOU WERE NOT ABLE TO STOP DRINKING ONCE YOU HAD STARTED: 0
HOW OFTEN DURING THE LAST YEAR HAVE YOU FAILED TO DO WHAT WAS NORMALLY EXPECTED FROM YOU BECAUSE OF DRINKING: 0
HAVE YOU OR SOMEONE ELSE BEEN INJURED AS A RESULT OF YOUR DRINKING: 0
HOW OFTEN DURING THE LAST YEAR HAVE YOU NEEDED AN ALCOHOLIC DRINK FIRST THING IN THE MORNING TO GET YOURSELF GOING AFTER A NIGHT OF HEAVY DRINKING: 0
HOW OFTEN DURING THE LAST YEAR HAVE YOU BEEN UNABLE TO REMEMBER WHAT HAPPENED THE NIGHT BEFORE BECAUSE YOU HAD BEEN DRINKING: 0
HOW OFTEN DURING THE LAST YEAR HAVE YOU HAD A FEELING OF GUILT OR REMORSE AFTER DRINKING: 0
HOW MANY STANDARD DRINKS CONTAINING ALCOHOL DO YOU HAVE ON A TYPICAL DAY: 1 OR 2

## 2024-02-19 ASSESSMENT — ENCOUNTER SYMPTOMS
SHORTNESS OF BREATH: 0
VOMITING: 0
ABDOMINAL PAIN: 0
DIARRHEA: 0

## 2024-02-19 ASSESSMENT — PATIENT HEALTH QUESTIONNAIRE - PHQ9
6. FEELING BAD ABOUT YOURSELF - OR THAT YOU ARE A FAILURE OR HAVE LET YOURSELF OR YOUR FAMILY DOWN: 0
10. IF YOU CHECKED OFF ANY PROBLEMS, HOW DIFFICULT HAVE THESE PROBLEMS MADE IT FOR YOU TO DO YOUR WORK, TAKE CARE OF THINGS AT HOME, OR GET ALONG WITH OTHER PEOPLE: 0
SUM OF ALL RESPONSES TO PHQ9 QUESTIONS 1 & 2: 0
8. MOVING OR SPEAKING SO SLOWLY THAT OTHER PEOPLE COULD HAVE NOTICED. OR THE OPPOSITE, BEING SO FIGETY OR RESTLESS THAT YOU HAVE BEEN MOVING AROUND A LOT MORE THAN USUAL: 0
SUM OF ALL RESPONSES TO PHQ QUESTIONS 1-9: 0
SUM OF ALL RESPONSES TO PHQ QUESTIONS 1-9: 0
5. POOR APPETITE OR OVEREATING: 0
2. FEELING DOWN, DEPRESSED OR HOPELESS: 0
9. THOUGHTS THAT YOU WOULD BE BETTER OFF DEAD, OR OF HURTING YOURSELF: 0
SUM OF ALL RESPONSES TO PHQ QUESTIONS 1-9: 0
3. TROUBLE FALLING OR STAYING ASLEEP: 0
7. TROUBLE CONCENTRATING ON THINGS, SUCH AS READING THE NEWSPAPER OR WATCHING TELEVISION: 0
SUM OF ALL RESPONSES TO PHQ QUESTIONS 1-9: 0
4. FEELING TIRED OR HAVING LITTLE ENERGY: 0
1. LITTLE INTEREST OR PLEASURE IN DOING THINGS: 0

## 2024-02-19 NOTE — PROGRESS NOTES
Chief Complaint   Patient presents with    Follow-up    Diabetes     /61 (Site: Left Upper Arm, Position: Sitting, Cuff Size: Large Adult)   Pulse 68   Temp 98.4 °F (36.9 °C) (Temporal)   Ht 1.816 m (5' 11.5\")   Wt 100.7 kg (222 lb)   SpO2 95%   BMI 30.53 kg/m²     1. Have you been to the ER, urgent care clinic since your last visit?  Hospitalized since your last visit?No    2. Have you seen or consulted any other health care providers outside of the [facility] since your last visit?  Include any pap smears or colon screening.  [facility]      Ambulatory

## 2024-02-19 NOTE — PROGRESS NOTES
\"Have you been to the ER, urgent care clinic since your last visit?  Hospitalized since your last visit?\"    YES - When: approximately 2 months ago.  Where and Why: [facility] ER for heart problem.    “Have you seen or consulted any other health care providers outside of [facility] since your last visit?”    YES - When: approximately 1  weeks ago.  Where and Why: [facility] for a follow up.    Chief Complaint   Patient presents with    Follow-up Chronic Condition     /67 (Site: Left Upper Arm, Position: Sitting, Cuff Size: Small Adult)   Pulse 67   Temp 98.4 °F (36.9 °C) (Oral)   Resp 18   Ht 1.803 m (5' 11\")   Wt 98.8 kg (217 lb 12.8 oz)   SpO2 96%   BMI 30.38 kg/m²

## 2024-02-19 NOTE — PROGRESS NOTES
Medicare Annual Wellness Visit    Matias Pacheco is here for Follow-up Chronic Condition    Assessment & Plan   Medicare annual wellness visit, subsequent  Type 2 diabetes mellitus with diabetic polyneuropathy, with long-term current use of insulin (HCC)  -     AMB POC HEMOGLOBIN A1C  -     Amb External Referral To Ophthalmology  -     Comprehensive Metabolic Panel; Future  -     Lipid Panel; Future  -     Microalbumin / Creatinine Urine Ratio; Future  Persistent atrial fibrillation (HCC)  -     TSH with Reflex to FT4; Future  Personal history of tobacco use  -     NE VISIT TO DISCUSS LUNG CA SCREEN W LDCT  -     CT Lung Screen (Initial/Annual/Baseline); Future  Actinic keratosis  -     Amb External Referral To Dermatology  Hyperpigmentation of skin  -     Amb External Referral To Dermatology  Essential hypertension  -     CBC; Future  -     Comprehensive Metabolic Panel; Future  -     Lipid Panel; Future  Advance care planning  -     [facility] -  Referral to [facility] Clinical Specialist  Screening for lung cancer  -     NE VISIT TO DISCUSS LUNG CA SCREEN W LDCT  -     CT Lung Screen (Initial/Annual/Baseline); Future     Recommendations for Preventive Services Due: see orders and patient instructions/AVS.  Recommended screening schedule for the next 5-10 years is provided to the patient in written form: see Patient Instructions/AVS.     Diabetes: Controlled, given A1c of 5.3, will cut down on dose of Levemir to 30 units twice daily.  Continue metformin and Rybelsus.  His Dexcom was set up in the clinic today.  Results for orders placed or performed in visit on 02/19/24   AMB POC HEMOGLOBIN A1C   Result Value Ref Range    Hemoglobin A1C, POC 5.3 %     Ordered blood work, am not sure what was done through cardiology.  He is going to get us those results.    No infection noted on left arm, utd with tdap     Up-to-date with [facility]. Refused AAA screening     Return in about 3 months (around 5/19/2024) for follow up.

## 2024-02-19 NOTE — PATIENT INSTRUCTIONS
Flu, shingles, pcv 20, RSV vaccine at pharmacy      9 Ways to Cut Back on Drinking  Maybe you've found yourself drinking more alcohol than you'd prefer. If you want to cut back, here are some ideas to try.    Think before you drink.  Do you really want a drink, or is it just a habit? If you're used to having a drink at a certain time, try doing something else then.     Look for substitutes.  Find some no-alcohol drinks that you enjoy, like flavored seltzer water, tea with honey, or tonic with a slice of lime. Or try alcohol-free beer or \"virgin\" cocktails (without the alcohol).     Drink more water.  Use water to quench your thirst. Drink a glass of water before you have any alcohol. Have another glass along with every drink or between drinks.     Shrink your drink.  For example, have a bottle of beer instead of a pint. Use a smaller glass for wine. Choose drinks with lower alcohol content (ABV%). Or use less liquor and more mixer in cocktails.     Slow down.  It's easy to drink quickly and without thinking about it. Pay attention, and make each drink last longer.     Do the math.  Total up how much you spend on alcohol each month. How much is that a year? If you cut back, what could you do with the money you save?     Take a break.  Choose a day or two each week when you won't drink at all. Notice how you feel on those days, physically and emotionally. How did you sleep? Do you feel better? Over time, add more break days.     Count calories.  Would you like to lose some weight? For some people that's a good motivator for cutting back. Figure out how many calories are in each drink. How many does that add up to in a day? In a week? In a month?     Practice saying no.  Be ready when someone offers you a drink. Try: \"Thanks, I've had enough.\" Or \"Thanks, but I'm cutting back.\" Or \"No, thanks. I feel better when I drink less.\"   Current as of: March 21, 2023               Content Version: 13.9  © 8460-9669 Healthwise

## 2024-02-19 NOTE — PROGRESS NOTES
[facility] PODIATRY & FOOT SURGERY       Patient Name: Matias Pacheco    : 1950    Visit Date: 2024    Office Visit Note    Subjective:         Patient is a 73 y.o. male who is being seen as a follow up with a history of diabetes with a chief complain of painful elongated toenails. Patient primary care physician is Anne Xie MD. Patient states the last office visit with PCP was 24.  Patient describes diabetes as being under control. Patient's last hemoglobin A1c was 6.0.  Patient denies any overt pedal pain.  Patient denies any recent pedal trauma.  Patient denies any systemic signs of infection but does state nails are thick and discolored.  No other lower extremity complaints at this time.    Past Medical History:   Diagnosis Date    Arthritis     OSTEO-KNEES    CAD (coronary artery disease) 11/19/2019    X2 STENTS    Diabetes (HCC)     Exposure to 2019 novel coronavirus 2020    High cholesterol     Hypertension      Past Surgical History:   Procedure Laterality Date    CAROTID STENT PLACEMENT      OTHER SURGICAL HISTORY      oral surgery    [place] UNLISTED PROCEDURE CARDIAC SURGERY  2019    placement of stent X2    TOTAL KNEE ARTHROPLASTY Left 2020       Family History   Problem Relation Age of Onset    Diabetes Sister     Heart Attack Father     Cancer Mother         breast, brain    Heart Attack Sister             No Known Problems Son     Cancer Sister         BRAIN TUMOR    Anesth Problems Neg Hx       Social History     Tobacco Use    Smoking status: Former     Current packs/day: 0.00     Average packs/day: 2.0 packs/day for 40.0 years (80.0 ttl pk-yrs)     Types: Cigarettes     Start date: 1972     Quit date: 2012     Years since quittin.1    Smokeless tobacco: Never   Substance Use Topics    Alcohol use: Yes     Alcohol/week: 6.0 - 12.0 standard drinks of alcohol     No Known Allergies  Prior to Admission medications

## 2024-02-20 ENCOUNTER — CLINICAL DOCUMENTATION (OUTPATIENT)
Dept: CASE MANAGEMENT | Age: 74
End: 2024-02-20

## 2024-02-20 NOTE — ACP (ADVANCE CARE PLANNING)
Advance Care Planning   Ambulatory ACP Specialist Patient Outreach    Date:  2/20/2024    ACP Specialist:  Rebeca Nicholson LCSW    Outreach call to patient in follow-up to ACP Specialist referral from:Anne Xie MD    [x] PCP  [] Provider   [] Ambulatory Care Management [] Other     For:                  [x] Advance Directive Assistance              [] Complete Portable DNR order              [] Complete POST/POLST/MOST              [] Code Status Discussion             [] Discuss Goals of Care             [] Early ACP Decision-Making              [] Other (Specify)    Date Referral Received:2/20/2024    Next Step:   [] ACP scheduled conversation  [x] Outreach again in one week               [] Email / Mail ACP Info Sheets  [] Email / Mail Advance Directive   [] Closing referral.  Routing closure to referring provider/staff and to ACP Specialist .    [] Closure letter mailed to patient with invitation to contact ACP Specialist if / when ready.   [] Other (Specify here):         [x] At this time, Healthcare Decision Maker Is:        [] Primary agent named in scanned advance directive.    [] Legal Next of Kin.     [x] Unable to determine legal decision maker at this time.         Outreaches:  [x] 1st -  Date:  2/20/2024               Intervention:  [] Spoke with Patient   [x] Left Voice mail [] Email / Mail    [] Elixrhart  [] Other (Specify) :     Outcomes: ACP Specialist made an outreach call to patient's home/mobile number ([phone]) listed in the medical record to offer an Advance Care Planning appointment.  Pt did not answer telephone call and voicemail message was left requesting a returned telephone call.  ACP Specialist will outreach to patient again next week to offer ACP appointment if patient has not returned call prior to the time of the 2nd outreach call.    Thank you for this referral.    Rebeca Nicholson LCSW, LISW-CP  Advance Care   [facility]

## 2024-02-26 DIAGNOSIS — I10 ESSENTIAL HYPERTENSION: ICD-10-CM

## 2024-02-26 DIAGNOSIS — I48.19 PERSISTENT ATRIAL FIBRILLATION (HCC): ICD-10-CM

## 2024-02-29 LAB
ALBUMIN SERPL-MCNC: 3.9 G/DL (ref 3.8–4.8)
ALBUMIN/GLOB SERPL: 1.3 {RATIO} (ref 1.2–2.2)
ALP SERPL-CCNC: 85 IU/L (ref 44–121)
ALT SERPL-CCNC: 73 IU/L (ref 0–44)
AST SERPL-CCNC: 53 IU/L (ref 0–40)
BILIRUB SERPL-MCNC: 0.7 MG/DL (ref 0–1.2)
BUN SERPL-MCNC: 17 MG/DL (ref 8–27)
BUN/CREAT SERPL: 15 (ref 10–24)
CALCIUM SERPL-MCNC: 9 MG/DL (ref 8.6–10.2)
CHLORIDE SERPL-SCNC: 101 MMOL/L (ref 96–106)
CHOLEST SERPL-MCNC: 119 MG/DL (ref 100–199)
CO2 SERPL-SCNC: 26 MMOL/L (ref 20–29)
CREAT SERPL-MCNC: 1.13 MG/DL (ref 0.76–1.27)
EGFRCR SERPLBLD CKD-EPI 2021: 68 ML/MIN/1.73
ERYTHROCYTE [DISTWIDTH] IN BLOOD BY AUTOMATED COUNT: 13.1 % (ref 11.6–15.4)
GLOBULIN SER CALC-MCNC: 3 G/DL (ref 1.5–4.5)
GLUCOSE SERPL-MCNC: 140 MG/DL (ref 70–99)
HCT VFR BLD AUTO: 38.4 % (ref 37.5–51)
HDLC SERPL-MCNC: 30 MG/DL
HGB BLD-MCNC: 12.7 G/DL (ref 13–17.7)
LDLC SERPL CALC-MCNC: 73 MG/DL (ref 0–99)
MCH RBC QN AUTO: 28.5 PG (ref 26.6–33)
MCHC RBC AUTO-ENTMCNC: 33.1 G/DL (ref 31.5–35.7)
MCV RBC AUTO: 86 FL (ref 79–97)
PLATELET # BLD AUTO: 213 X10E3/UL (ref 150–450)
POTASSIUM SERPL-SCNC: 4.3 MMOL/L (ref 3.5–5.2)
PROT SERPL-MCNC: 6.9 G/DL (ref 6–8.5)
RBC # BLD AUTO: 4.45 X10E6/UL (ref 4.14–5.8)
SODIUM SERPL-SCNC: 142 MMOL/L (ref 134–144)
TRIGL SERPL-MCNC: 78 MG/DL (ref 0–149)
TSH SERPL DL<=0.005 MIU/L-ACNC: 4.31 UIU/ML (ref 0.45–4.5)
VLDLC SERPL CALC-MCNC: 16 MG/DL (ref 5–40)
WBC # BLD AUTO: 5.5 X10E3/UL (ref 3.4–10.8)

## 2024-02-29 RX ORDER — CARVEDILOL 12.5 MG/1
12.5 TABLET ORAL 2 TIMES DAILY WITH MEALS
Qty: 180 TABLET | Refills: 0 | Status: SHIPPED | OUTPATIENT
Start: 2024-02-29

## 2024-03-01 ENCOUNTER — CLINICAL DOCUMENTATION (OUTPATIENT)
Dept: CASE MANAGEMENT | Age: 74
End: 2024-03-01

## 2024-03-01 LAB
ALBUMIN/CREAT UR: 7 MG/G CREAT (ref 0–29)
CREAT UR-MCNC: 46.7 MG/DL
MICROALBUMIN UR-MCNC: 3.3 UG/ML

## 2024-03-07 ENCOUNTER — CLINICAL DOCUMENTATION (OUTPATIENT)
Dept: CASE MANAGEMENT | Age: 74
End: 2024-03-07

## 2024-04-08 DIAGNOSIS — Z79.4 TYPE 2 DIABETES MELLITUS WITH DIABETIC POLYNEUROPATHY, WITH LONG-TERM CURRENT USE OF INSULIN (HCC): Primary | ICD-10-CM

## 2024-04-08 DIAGNOSIS — E11.42 TYPE 2 DIABETES MELLITUS WITH DIABETIC POLYNEUROPATHY, WITH LONG-TERM CURRENT USE OF INSULIN (HCC): Primary | ICD-10-CM

## 2024-04-08 RX ORDER — ACYCLOVIR 400 MG/1
TABLET ORAL
Qty: 2 EACH | Refills: 4 | Status: SHIPPED | OUTPATIENT
Start: 2024-04-08

## 2024-04-11 DIAGNOSIS — I48.19 PERSISTENT ATRIAL FIBRILLATION (HCC): ICD-10-CM

## 2024-04-11 DIAGNOSIS — I10 ESSENTIAL HYPERTENSION: ICD-10-CM

## 2024-04-11 DIAGNOSIS — I25.10 CORONARY ARTERY DISEASE INVOLVING NATIVE HEART, UNSPECIFIED VESSEL OR LESION TYPE, UNSPECIFIED WHETHER ANGINA PRESENT: ICD-10-CM

## 2024-04-11 RX ORDER — AMLODIPINE BESYLATE 10 MG/1
10 TABLET ORAL DAILY
Qty: 90 TABLET | Refills: 1 | Status: SHIPPED | OUTPATIENT
Start: 2024-04-11

## 2024-04-11 RX ORDER — CARVEDILOL 12.5 MG/1
12.5 TABLET ORAL 2 TIMES DAILY WITH MEALS
Qty: 180 TABLET | Refills: 1 | Status: SHIPPED | OUTPATIENT
Start: 2024-04-11

## 2024-04-11 RX ORDER — FUROSEMIDE 20 MG/1
20 TABLET ORAL DAILY
Qty: 90 TABLET | Refills: 0 | Status: SHIPPED | OUTPATIENT
Start: 2024-04-11

## 2024-04-11 RX ORDER — LOSARTAN POTASSIUM 25 MG/1
25 TABLET ORAL DAILY
Qty: 90 TABLET | Refills: 1 | Status: SHIPPED | OUTPATIENT
Start: 2024-04-11

## 2024-04-16 ENCOUNTER — TELEPHONE (OUTPATIENT)
Facility: CLINIC | Age: 74
End: 2024-04-16

## 2024-04-19 DIAGNOSIS — Z79.4 TYPE 2 DIABETES MELLITUS WITH DIABETIC POLYNEUROPATHY, WITH LONG-TERM CURRENT USE OF INSULIN (HCC): Primary | ICD-10-CM

## 2024-04-19 DIAGNOSIS — E11.42 TYPE 2 DIABETES MELLITUS WITH DIABETIC POLYNEUROPATHY, WITH LONG-TERM CURRENT USE OF INSULIN (HCC): Primary | ICD-10-CM

## 2024-04-19 RX ORDER — PEN NEEDLE, DIABETIC 32GX 5/32"
NEEDLE, DISPOSABLE MISCELLANEOUS
Qty: 300 EACH | Refills: 2 | Status: SHIPPED | OUTPATIENT
Start: 2024-04-19

## 2024-04-29 DIAGNOSIS — E78.5 HYPERLIPIDEMIA, UNSPECIFIED HYPERLIPIDEMIA TYPE: ICD-10-CM

## 2024-05-01 RX ORDER — ATORVASTATIN CALCIUM 40 MG/1
40 TABLET, FILM COATED ORAL
Qty: 90 TABLET | Refills: 1 | Status: SHIPPED | OUTPATIENT
Start: 2024-05-01

## 2024-05-21 DIAGNOSIS — I25.10 CORONARY ARTERY DISEASE INVOLVING NATIVE HEART, UNSPECIFIED VESSEL OR LESION TYPE, UNSPECIFIED WHETHER ANGINA PRESENT: ICD-10-CM

## 2024-05-22 RX ORDER — FUROSEMIDE 20 MG/1
20 TABLET ORAL DAILY
Qty: 90 TABLET | Refills: 1 | Status: SHIPPED | OUTPATIENT
Start: 2024-05-22

## 2024-05-24 DIAGNOSIS — Z79.4 TYPE 2 DIABETES MELLITUS WITH OTHER SPECIFIED COMPLICATION, WITH LONG-TERM CURRENT USE OF INSULIN (HCC): ICD-10-CM

## 2024-05-24 DIAGNOSIS — E11.69 TYPE 2 DIABETES MELLITUS WITH OTHER SPECIFIED COMPLICATION, WITH LONG-TERM CURRENT USE OF INSULIN (HCC): ICD-10-CM

## 2024-05-28 ENCOUNTER — OFFICE VISIT (OUTPATIENT)
Age: 74
End: 2024-05-28

## 2024-05-28 VITALS
OXYGEN SATURATION: 99 % | DIASTOLIC BLOOD PRESSURE: 64 MMHG | HEIGHT: 71 IN | WEIGHT: 217 LBS | BODY MASS INDEX: 30.38 KG/M2 | HEART RATE: 73 BPM | SYSTOLIC BLOOD PRESSURE: 138 MMHG | TEMPERATURE: 98.7 F

## 2024-05-28 DIAGNOSIS — E11.42 DIABETIC SENSORIMOTOR NEUROPATHY (HCC): ICD-10-CM

## 2024-05-28 DIAGNOSIS — B35.1 ONYCHOMYCOSIS: Primary | ICD-10-CM

## 2024-06-06 DIAGNOSIS — Z79.4 TYPE 2 DIABETES MELLITUS WITH OTHER SPECIFIED COMPLICATION, WITH LONG-TERM CURRENT USE OF INSULIN (HCC): ICD-10-CM

## 2024-06-06 DIAGNOSIS — E11.69 TYPE 2 DIABETES MELLITUS WITH OTHER SPECIFIED COMPLICATION, WITH LONG-TERM CURRENT USE OF INSULIN (HCC): ICD-10-CM

## 2024-06-06 RX ORDER — ORAL SEMAGLUTIDE 14 MG/1
14 TABLET ORAL DAILY
Qty: 90 TABLET | Refills: 1 | Status: SHIPPED | OUTPATIENT
Start: 2024-06-06

## 2024-06-06 NOTE — TELEPHONE ENCOUNTER
Patient called requesting a refill for the following medication:      Semaglutide (RYBELSUS) 14 MG TABS   90 tablet  Refill 1          Please send to [facility] on [address] in [place], [place]

## 2024-06-14 ASSESSMENT — ENCOUNTER SYMPTOMS
ABDOMINAL PAIN: 0
SHORTNESS OF BREATH: 0
VOMITING: 0
DIARRHEA: 0

## 2024-06-15 NOTE — PROGRESS NOTES
Chief Complaint   Patient presents with    Follow-up    Diabetes     3 month f/u      /64 (Site: Left Upper Arm, Position: Sitting, Cuff Size: Medium Adult)   Pulse 73   Temp 98.7 °F (37.1 °C) (Temporal)   Ht 1.803 m (5' 11\")   Wt 98.4 kg (217 lb)   SpO2 99%   BMI 30.27 kg/m²     1. Have you been to the ER, urgent care clinic since your last visit?  Hospitalized since your last visit?No    2. Have you seen or consulted any other health care providers outside of the [facility] since your last visit?  Include any pap smears or colon screening.  [facility]    
Negative for ear pain.    Respiratory:  Negative for shortness of breath.    Cardiovascular:  Negative for leg swelling.   Gastrointestinal:  Negative for abdominal pain, diarrhea and vomiting.   Neurological:  Positive for numbness. Negative for weakness.   Psychiatric/Behavioral:  Negative for behavioral problems. The patient is not nervous/anxious.         Objective:     Vitals:    05/28/24 1304   BP: 138/64   Pulse: 73   Temp: 98.7 °F (37.1 °C)   SpO2: 99%       GEN: Pt is AAOx4 and in NAD.     DERM: No wounds noted to B/L LE. Dry skin noted to B/L LE. No proximal lymphatic streaking noted to B/L LE. Toenails 1-5 B/L noted to be elongated, dystrophic and with subungual debris. Skin noted to be thin and atrophic to the bilateral lower extremity.    VASC: Pedal pulses (DP/PT) diminished to B/L LE. CFT<3sec to all digits of B/L LE. No hair growth noted to the B/L LE. Skin temp is warm to cool from proximal to distal for B/L LE. Neg homas/kim signs to B/L LE. No varicosities or telangectasias noted to B/L LE.    NEURO: Protective and epicritic sensations grossly absent to B/L LE. Paraesthesia noted to the bilateral feet.    MSK: Negative pain on palpation, no gross deformities noted. Good muscle tone and bulk noted to B/L LE.      Assessment:      Diagnosis Orders   1. Onychomycosis        2. Diabetic sensorimotor neuropathy (HCC)             Plan:     Patient seen and evaluated in the office.  Discussed and educated patient regarding his current medical condition as it pertains to his diabetes and his thick/discolored toenails.  Instructed patient to monitor his feet daily for possible wound formation, be compliant with all medications and wear supportive shoe gear for wound prevention. Reviewed and discussed most recent lab work, specifically as it pertains to his diabetes. Patient verbalized understanding of all discussed and reviewed.  A sharp toenail plate debridement was performed to all 10 pedal digits

## 2024-06-18 ENCOUNTER — OFFICE VISIT (OUTPATIENT)
Facility: CLINIC | Age: 74
End: 2024-06-18
Payer: MEDICARE

## 2024-06-18 VITALS
HEART RATE: 68 BPM | RESPIRATION RATE: 18 BRPM | OXYGEN SATURATION: 93 % | DIASTOLIC BLOOD PRESSURE: 64 MMHG | HEIGHT: 71 IN | SYSTOLIC BLOOD PRESSURE: 141 MMHG | TEMPERATURE: 98.1 F | WEIGHT: 226.6 LBS | BODY MASS INDEX: 31.72 KG/M2

## 2024-06-18 DIAGNOSIS — E11.65 TYPE 2 DIABETES MELLITUS WITH HYPERGLYCEMIA, WITH LONG-TERM CURRENT USE OF INSULIN (HCC): Primary | ICD-10-CM

## 2024-06-18 DIAGNOSIS — I25.10 CORONARY ARTERY DISEASE INVOLVING NATIVE HEART, UNSPECIFIED VESSEL OR LESION TYPE, UNSPECIFIED WHETHER ANGINA PRESENT: ICD-10-CM

## 2024-06-18 DIAGNOSIS — Z79.4 TYPE 2 DIABETES MELLITUS WITH HYPERGLYCEMIA, WITH LONG-TERM CURRENT USE OF INSULIN (HCC): ICD-10-CM

## 2024-06-18 DIAGNOSIS — E11.65 TYPE 2 DIABETES MELLITUS WITH HYPERGLYCEMIA, WITH LONG-TERM CURRENT USE OF INSULIN (HCC): ICD-10-CM

## 2024-06-18 DIAGNOSIS — Z79.4 TYPE 2 DIABETES MELLITUS WITH HYPERGLYCEMIA, WITH LONG-TERM CURRENT USE OF INSULIN (HCC): Primary | ICD-10-CM

## 2024-06-18 DIAGNOSIS — I10 ESSENTIAL HYPERTENSION: ICD-10-CM

## 2024-06-18 DIAGNOSIS — I48.19 PERSISTENT ATRIAL FIBRILLATION (HCC): ICD-10-CM

## 2024-06-18 DIAGNOSIS — D68.69 SECONDARY HYPERCOAGULABLE STATE (HCC): ICD-10-CM

## 2024-06-18 PROCEDURE — G8427 DOCREV CUR MEDS BY ELIG CLIN: HCPCS | Performed by: STUDENT IN AN ORGANIZED HEALTH CARE EDUCATION/TRAINING PROGRAM

## 2024-06-18 PROCEDURE — 1123F ACP DISCUSS/DSCN MKR DOCD: CPT | Performed by: STUDENT IN AN ORGANIZED HEALTH CARE EDUCATION/TRAINING PROGRAM

## 2024-06-18 PROCEDURE — 3077F SYST BP >= 140 MM HG: CPT | Performed by: STUDENT IN AN ORGANIZED HEALTH CARE EDUCATION/TRAINING PROGRAM

## 2024-06-18 PROCEDURE — 3044F HG A1C LEVEL LT 7.0%: CPT | Performed by: STUDENT IN AN ORGANIZED HEALTH CARE EDUCATION/TRAINING PROGRAM

## 2024-06-18 PROCEDURE — 99214 OFFICE O/P EST MOD 30 MIN: CPT | Performed by: STUDENT IN AN ORGANIZED HEALTH CARE EDUCATION/TRAINING PROGRAM

## 2024-06-18 PROCEDURE — 1036F TOBACCO NON-USER: CPT | Performed by: STUDENT IN AN ORGANIZED HEALTH CARE EDUCATION/TRAINING PROGRAM

## 2024-06-18 PROCEDURE — G8417 CALC BMI ABV UP PARAM F/U: HCPCS | Performed by: STUDENT IN AN ORGANIZED HEALTH CARE EDUCATION/TRAINING PROGRAM

## 2024-06-18 PROCEDURE — 3017F COLORECTAL CA SCREEN DOC REV: CPT | Performed by: STUDENT IN AN ORGANIZED HEALTH CARE EDUCATION/TRAINING PROGRAM

## 2024-06-18 PROCEDURE — 3078F DIAST BP <80 MM HG: CPT | Performed by: STUDENT IN AN ORGANIZED HEALTH CARE EDUCATION/TRAINING PROGRAM

## 2024-06-18 PROCEDURE — 2022F DILAT RTA XM EVC RTNOPTHY: CPT | Performed by: STUDENT IN AN ORGANIZED HEALTH CARE EDUCATION/TRAINING PROGRAM

## 2024-06-18 RX ORDER — PEN NEEDLE, DIABETIC 32GX 5/32"
NEEDLE, DISPOSABLE MISCELLANEOUS
Qty: 300 EACH | Refills: 2 | Status: SHIPPED | OUTPATIENT
Start: 2024-06-18

## 2024-06-18 NOTE — PATIENT INSTRUCTIONS
Patient Education        Hypoglycemia: Care Instructions  Overview  Hypoglycemia means that your blood sugar is low and your body isn’t getting enough fuel. Low blood sugar can be caused by too much insulin or certain medicines. Some people get low blood sugar from missing a meal or exercising too hard without eating enough food.    Know your signs of low blood sugar. They're different for everyone. Common early signs include nausea; hunger; and feeling nervous, irritable, or shaky.    It can help to check your blood sugar levels often. Take your insulin or other medicine as prescribed.  How can you care for yourself?    Use the \"rule of 15\" to treat low blood sugar.  Eat 15 grams of carbohydrate from a quick-sugar food (such as 3 or 4 glucose tablets or 1/2 cup of juice). Wait 15 minutes and check your blood sugar. Repeat if your blood sugar is still below 70 mg/dL.    Eat after your blood sugar is in a safe range.  A snack or meal can reduce symptoms and prevent low blood sugar from coming back.    Tell friends, family, and coworkers how they can help you.  Make sure that they know the symptoms of low blood sugar and how to help you get your sugar levels up.    If you have glucagon, keep it with you.  Make sure that your friends, family, and coworkers know how to use it.  When should you call for help?    Call anytime you think you may need emergency care. For example, call if:  You passed out (lost consciousness).  You are confused or cannot think clearly.  Your blood sugar is very high or very low.  Watch closely for changes in your health, and be sure to contact your doctor if:  Your blood sugar stays outside the level your doctor set for you.  You have any problems.  Where can you learn more?  Go to https://www.Net[employer].net/patientEd and enter R955 to learn more about \"Hypoglycemia: Care Instructions.\"  Current as of: October 2, 2023  Content Version: 14.1  © 2508-9787 Healthwise, [employer].   Care

## 2024-06-18 NOTE — PROGRESS NOTES
Matias Pacheco (: 1950) is a 73 y.o. male, Established patient patient, here for evaluation of the following chief complaint(s):  Follow-up Chronic Condition       ASSESSMENT/PLAN:  Below is the assessment and plan developed based on review of pertinent history, physical exam, labs, studies, and medications.    1. Type 2 diabetes mellitus with hyperglycemia, with long-term current use of insulin (HCC)  -     Hemoglobin A1C; Future  -     insulin detemir (LEVEMIR FLEXPEN) 100 UNIT/ML injection pen; Inject 30 Units into the skin 2 times daily, Disp-5 Adjustable Dose Pre-filled Pen Syringe, R-2Normal  -     BD PEN NEEDLE YENY 2ND GEN 32G X 4 MM MISC; USE BY SUBCUTANEOUS ROUTE TWO (2) TIMES A DAY. DX E11.9, Disp-300 each, R-2, DAWNormal  2. Coronary artery disease involving native heart, unspecified vessel or lesion type, unspecified whether angina present  3. Essential hypertension  4. Persistent atrial fibrillation (HCC)  5. Secondary hypercoagulable state (HCC)    Diabetes: A1c through lab ordered.  I told him to cut down on Levemir previous visit as he was complaining of hypoglycemia, he had made these changes.  He still continues to complain of occasional episodes.  Recommend to come down on Levemir 30 units twice daily.   Hypertension: His blood pressure is being monitored remotely by cardiology, mildly elevated.  Continue current regimen for now.    His amiodarone was stopped due to elevated liver tests, he had recent blood work and liver tests have normalized.    Reviewed [facility] results  He has appointment with dermatology.     Right foot bruising noted, no open wound.    Return in about 3 months (around 2024) for follow up.         SUBJECTIVE/OBJECTIVE:  HPI    Matias Pacheco is a 73 yr old who presents to the clinic for a follow up.  He has a history of diabetes, CAD status post stents, atrial fibrillation, lumbar radiculopathy.   He is following with cardiology, amiodarone was stopped due to

## 2024-06-18 NOTE — PROGRESS NOTES
\"Have you been to the ER, urgent care clinic since your last visit?  Hospitalized since your last visit?\"    NO    “Have you seen or consulted any other health care providers outside of [facility] since your last visit?”    NO    Chief Complaint   Patient presents with    Follow-up Chronic Condition     BP (!) 145/69 (Site: Left Upper Arm, Position: Sitting, Cuff Size: Medium Adult)   Pulse 69   Temp 98.1 °F (36.7 °C) (Oral)   Resp 18   Ht 1.803 m (5' 11\")   Wt 102.8 kg (226 lb 9.6 oz)   SpO2 93%   BMI 31.60 kg/m²               Click Here for Release of Records Request

## 2024-06-19 LAB — HBA1C MFR BLD: 6.3 % (ref 4.8–5.6)

## 2024-06-25 DIAGNOSIS — E11.42 TYPE 2 DIABETES MELLITUS WITH DIABETIC POLYNEUROPATHY, WITH LONG-TERM CURRENT USE OF INSULIN (HCC): ICD-10-CM

## 2024-06-25 DIAGNOSIS — Z79.4 TYPE 2 DIABETES MELLITUS WITH DIABETIC POLYNEUROPATHY, WITH LONG-TERM CURRENT USE OF INSULIN (HCC): ICD-10-CM

## 2024-06-26 RX ORDER — ACYCLOVIR 400 MG/1
TABLET ORAL
Qty: 6 EACH | Refills: 3 | Status: SHIPPED | OUTPATIENT
Start: 2024-06-26

## 2024-07-29 ENCOUNTER — OFFICE VISIT (OUTPATIENT)
Age: 74
End: 2024-07-29
Payer: MEDICARE

## 2024-07-29 VITALS
BODY MASS INDEX: 31.84 KG/M2 | TEMPERATURE: 98.2 F | SYSTOLIC BLOOD PRESSURE: 140 MMHG | HEIGHT: 71 IN | HEART RATE: 69 BPM | WEIGHT: 227.4 LBS | OXYGEN SATURATION: 96 % | RESPIRATION RATE: 18 BRPM | DIASTOLIC BLOOD PRESSURE: 65 MMHG

## 2024-07-29 DIAGNOSIS — E11.42 DIABETIC SENSORIMOTOR NEUROPATHY (HCC): ICD-10-CM

## 2024-07-29 DIAGNOSIS — B35.1 ONYCHOMYCOSIS: Primary | ICD-10-CM

## 2024-07-29 PROCEDURE — G8417 CALC BMI ABV UP PARAM F/U: HCPCS | Performed by: PODIATRIST

## 2024-07-29 PROCEDURE — 1123F ACP DISCUSS/DSCN MKR DOCD: CPT | Performed by: PODIATRIST

## 2024-07-29 PROCEDURE — 3044F HG A1C LEVEL LT 7.0%: CPT | Performed by: PODIATRIST

## 2024-07-29 PROCEDURE — 1036F TOBACCO NON-USER: CPT | Performed by: PODIATRIST

## 2024-07-29 PROCEDURE — 3017F COLORECTAL CA SCREEN DOC REV: CPT | Performed by: PODIATRIST

## 2024-07-29 PROCEDURE — 3077F SYST BP >= 140 MM HG: CPT | Performed by: PODIATRIST

## 2024-07-29 PROCEDURE — 11721 DEBRIDE NAIL 6 OR MORE: CPT | Performed by: PODIATRIST

## 2024-07-29 PROCEDURE — 3078F DIAST BP <80 MM HG: CPT | Performed by: PODIATRIST

## 2024-07-29 PROCEDURE — G8427 DOCREV CUR MEDS BY ELIG CLIN: HCPCS | Performed by: PODIATRIST

## 2024-07-29 PROCEDURE — 99213 OFFICE O/P EST LOW 20 MIN: CPT | Performed by: PODIATRIST

## 2024-07-29 PROCEDURE — 2022F DILAT RTA XM EVC RTNOPTHY: CPT | Performed by: PODIATRIST

## 2024-07-29 ASSESSMENT — ENCOUNTER SYMPTOMS
DIARRHEA: 0
SHORTNESS OF BREATH: 0
VOMITING: 0
ABDOMINAL PAIN: 0

## 2024-07-29 NOTE — PROGRESS NOTES
[facility] PODIATRY & FOOT SURGERY       Patient Name: Matias Pacheco    : 1950    Visit Date: 2024    Office Visit Note    Subjective:         Patient is a 73 y.o. male who is being seen as a follow up with a history of diabetes with a chief complain of painful elongated toenails. Patient primary care physician is Anne Xie MD. Patient states the last office visit with PCP was 24.  Patient describes diabetes as being under control. Patient's last hemoglobin A1c was 6.3.  Patient denies any overt pedal pain.  Patient denies any recent pedal trauma.  Patient denies any systemic signs of infection but does state nails are thick and discolored.  No other lower extremity complaints at this time.    Past Medical History:   Diagnosis Date    Arthritis     OSTEO-KNEES    CAD (coronary artery disease) 11/19/2019    X2 STENTS    Diabetes (HCC)     Exposure to 2019 novel coronavirus 2020    High cholesterol     Hypertension      Past Surgical History:   Procedure Laterality Date    CAROTID STENT PLACEMENT      OTHER SURGICAL HISTORY      oral surgery    [place] UNLISTED PROCEDURE CARDIAC SURGERY  2019    placement of stent X2    TOTAL KNEE ARTHROPLASTY Left 2020       Family History   Problem Relation Age of Onset    Diabetes Sister     Heart Attack Father     Cancer Mother         breast, brain    Heart Attack Sister             No Known Problems Son     Cancer Sister         BRAIN TUMOR    Anesth Problems Neg Hx       Social History     Tobacco Use    Smoking status: Former     Current packs/day: 0.00     Average packs/day: 2.0 packs/day for 40.0 years (80.0 ttl pk-yrs)     Types: Cigarettes     Start date: 1972     Quit date: 2012     Years since quittin.5    Smokeless tobacco: Never   Substance Use Topics    Alcohol use: Yes     Alcohol/week: 6.0 - 12.0 standard drinks of alcohol     No Known Allergies  Prior to Admission medications

## 2024-07-29 NOTE — PROGRESS NOTES
Matias Pacheco is a 73 y.o. male here for   Chief Complaint   Patient presents with    Follow-up     2 month follow up for toenail problem.    Diabetes     Foot care.       All medication reviewed.     Vitals:    07/29/24 1315   BP: (!) 140/65   Site: Right Upper Arm   Position: Sitting   Cuff Size: Large Adult   Pulse: 69   Resp: 18   Temp: 98.2 °F (36.8 °C)   TempSrc: Temporal   SpO2: 96%   Weight: 103.1 kg (227 lb 6.4 oz)   Height: 1.803 m (5' 11\")        1. Have you been to the ER, urgent care clinic since your last visit?  Hospitalized since your last visit? -no    2. Have you seen or consulted any other health care providers outside of the [facility] since your last visit?  Include any pap smears or colon screening.-no

## 2024-09-01 DIAGNOSIS — Z79.4 TYPE 2 DIABETES MELLITUS WITH OTHER SPECIFIED COMPLICATION, WITH LONG-TERM CURRENT USE OF INSULIN (HCC): ICD-10-CM

## 2024-09-01 DIAGNOSIS — I48.19 PERSISTENT ATRIAL FIBRILLATION (HCC): ICD-10-CM

## 2024-09-01 DIAGNOSIS — I10 ESSENTIAL HYPERTENSION: ICD-10-CM

## 2024-09-01 DIAGNOSIS — E11.69 TYPE 2 DIABETES MELLITUS WITH OTHER SPECIFIED COMPLICATION, WITH LONG-TERM CURRENT USE OF INSULIN (HCC): ICD-10-CM

## 2024-09-02 DIAGNOSIS — E11.65 TYPE 2 DIABETES MELLITUS WITH HYPERGLYCEMIA, WITH LONG-TERM CURRENT USE OF INSULIN (HCC): ICD-10-CM

## 2024-09-02 DIAGNOSIS — Z79.4 TYPE 2 DIABETES MELLITUS WITH HYPERGLYCEMIA, WITH LONG-TERM CURRENT USE OF INSULIN (HCC): ICD-10-CM

## 2024-09-03 DIAGNOSIS — E11.69 TYPE 2 DIABETES MELLITUS WITH OTHER SPECIFIED COMPLICATION, WITH LONG-TERM CURRENT USE OF INSULIN (HCC): ICD-10-CM

## 2024-09-03 DIAGNOSIS — Z79.4 TYPE 2 DIABETES MELLITUS WITH OTHER SPECIFIED COMPLICATION, WITH LONG-TERM CURRENT USE OF INSULIN (HCC): ICD-10-CM

## 2024-09-03 DIAGNOSIS — I25.10 CORONARY ARTERY DISEASE INVOLVING NATIVE HEART, UNSPECIFIED VESSEL OR LESION TYPE, UNSPECIFIED WHETHER ANGINA PRESENT: ICD-10-CM

## 2024-09-03 RX ORDER — CARVEDILOL 12.5 MG/1
12.5 TABLET ORAL 2 TIMES DAILY WITH MEALS
Qty: 180 TABLET | Refills: 1 | Status: SHIPPED | OUTPATIENT
Start: 2024-09-03

## 2024-09-03 RX ORDER — AMLODIPINE BESYLATE 10 MG/1
10 TABLET ORAL DAILY
Qty: 90 TABLET | Refills: 1 | Status: SHIPPED | OUTPATIENT
Start: 2024-09-03

## 2024-09-04 RX ORDER — ORAL SEMAGLUTIDE 14 MG/1
14 TABLET ORAL DAILY
Qty: 90 TABLET | Refills: 1 | Status: SHIPPED | OUTPATIENT
Start: 2024-09-04

## 2024-09-04 RX ORDER — FUROSEMIDE 20 MG
20 TABLET ORAL DAILY
Qty: 90 TABLET | Refills: 1 | Status: SHIPPED | OUTPATIENT
Start: 2024-09-04

## 2024-09-05 DIAGNOSIS — I10 ESSENTIAL HYPERTENSION: ICD-10-CM

## 2024-09-05 RX ORDER — LOSARTAN POTASSIUM 25 MG/1
25 TABLET ORAL DAILY
Qty: 90 TABLET | Refills: 1 | Status: SHIPPED | OUTPATIENT
Start: 2024-09-05

## 2024-10-15 ENCOUNTER — OFFICE VISIT (OUTPATIENT)
Facility: CLINIC | Age: 74
End: 2024-10-15
Payer: MEDICARE

## 2024-10-15 VITALS
OXYGEN SATURATION: 91 % | DIASTOLIC BLOOD PRESSURE: 61 MMHG | HEART RATE: 75 BPM | SYSTOLIC BLOOD PRESSURE: 129 MMHG | HEIGHT: 72 IN | TEMPERATURE: 98.2 F | RESPIRATION RATE: 18 BRPM | WEIGHT: 231.6 LBS | BODY MASS INDEX: 31.37 KG/M2

## 2024-10-15 DIAGNOSIS — Z79.4 TYPE 2 DIABETES MELLITUS WITH HYPERGLYCEMIA, WITH LONG-TERM CURRENT USE OF INSULIN (HCC): ICD-10-CM

## 2024-10-15 DIAGNOSIS — E78.5 HYPERLIPIDEMIA, UNSPECIFIED HYPERLIPIDEMIA TYPE: ICD-10-CM

## 2024-10-15 DIAGNOSIS — E11.65 TYPE 2 DIABETES MELLITUS WITH HYPERGLYCEMIA, WITH LONG-TERM CURRENT USE OF INSULIN (HCC): ICD-10-CM

## 2024-10-15 DIAGNOSIS — N52.9 ERECTILE DYSFUNCTION, UNSPECIFIED ERECTILE DYSFUNCTION TYPE: ICD-10-CM

## 2024-10-15 DIAGNOSIS — I48.19 PERSISTENT ATRIAL FIBRILLATION (HCC): ICD-10-CM

## 2024-10-15 DIAGNOSIS — Z95.5 HISTORY OF HEART ARTERY STENT: ICD-10-CM

## 2024-10-15 DIAGNOSIS — D68.69 SECONDARY HYPERCOAGULABLE STATE (HCC): ICD-10-CM

## 2024-10-15 DIAGNOSIS — I25.10 CORONARY ARTERY DISEASE INVOLVING NATIVE HEART, UNSPECIFIED VESSEL OR LESION TYPE, UNSPECIFIED WHETHER ANGINA PRESENT: ICD-10-CM

## 2024-10-15 DIAGNOSIS — E11.65 TYPE 2 DIABETES MELLITUS WITH HYPERGLYCEMIA, WITH LONG-TERM CURRENT USE OF INSULIN (HCC): Primary | ICD-10-CM

## 2024-10-15 DIAGNOSIS — Z79.4 TYPE 2 DIABETES MELLITUS WITH HYPERGLYCEMIA, WITH LONG-TERM CURRENT USE OF INSULIN (HCC): Primary | ICD-10-CM

## 2024-10-15 DIAGNOSIS — I10 ESSENTIAL HYPERTENSION: ICD-10-CM

## 2024-10-15 LAB — HBA1C MFR BLD: 5.9 %

## 2024-10-15 PROCEDURE — 3078F DIAST BP <80 MM HG: CPT | Performed by: STUDENT IN AN ORGANIZED HEALTH CARE EDUCATION/TRAINING PROGRAM

## 2024-10-15 PROCEDURE — 99214 OFFICE O/P EST MOD 30 MIN: CPT | Performed by: STUDENT IN AN ORGANIZED HEALTH CARE EDUCATION/TRAINING PROGRAM

## 2024-10-15 PROCEDURE — 3074F SYST BP LT 130 MM HG: CPT | Performed by: STUDENT IN AN ORGANIZED HEALTH CARE EDUCATION/TRAINING PROGRAM

## 2024-10-15 PROCEDURE — 3044F HG A1C LEVEL LT 7.0%: CPT | Performed by: STUDENT IN AN ORGANIZED HEALTH CARE EDUCATION/TRAINING PROGRAM

## 2024-10-15 PROCEDURE — 83036 HEMOGLOBIN GLYCOSYLATED A1C: CPT | Performed by: STUDENT IN AN ORGANIZED HEALTH CARE EDUCATION/TRAINING PROGRAM

## 2024-10-15 PROCEDURE — 1123F ACP DISCUSS/DSCN MKR DOCD: CPT | Performed by: STUDENT IN AN ORGANIZED HEALTH CARE EDUCATION/TRAINING PROGRAM

## 2024-10-15 RX ORDER — ATORVASTATIN CALCIUM 40 MG/1
40 TABLET, FILM COATED ORAL DAILY
Qty: 90 TABLET | Refills: 3 | Status: SHIPPED | OUTPATIENT
Start: 2024-10-15

## 2024-10-15 SDOH — ECONOMIC STABILITY: FOOD INSECURITY: WITHIN THE PAST 12 MONTHS, THE FOOD YOU BOUGHT JUST DIDN'T LAST AND YOU DIDN'T HAVE MONEY TO GET MORE.: NEVER TRUE

## 2024-10-15 SDOH — ECONOMIC STABILITY: FOOD INSECURITY: WITHIN THE PAST 12 MONTHS, YOU WORRIED THAT YOUR FOOD WOULD RUN OUT BEFORE YOU GOT MONEY TO BUY MORE.: NEVER TRUE

## 2024-10-15 SDOH — ECONOMIC STABILITY: INCOME INSECURITY: HOW HARD IS IT FOR YOU TO PAY FOR THE VERY BASICS LIKE FOOD, HOUSING, MEDICAL CARE, AND HEATING?: NOT HARD AT ALL

## 2024-10-15 ASSESSMENT — ENCOUNTER SYMPTOMS
ABDOMINAL PAIN: 0
COUGH: 0
SHORTNESS OF BREATH: 0

## 2024-10-15 NOTE — PROGRESS NOTES
Matias Pacheco (: 1950) is a 74 y.o. male, Established patient patient, here for evaluation of the following chief complaint(s):  Follow-up Chronic Condition       ASSESSMENT/PLAN:  Below is the assessment and plan developed based on review of pertinent history, physical exam, labs, studies, and medications.    1. Type 2 diabetes mellitus with hyperglycemia, with long-term current use of insulin (HCC)  -     AMB POC HEMOGLOBIN A1C  -     Comprehensive Metabolic Panel; Future  -     TSH; Future  -     T4, Free; Future  2. Coronary artery disease involving native heart, unspecified vessel or lesion type, unspecified whether angina present  3. Essential hypertension  -     Comprehensive Metabolic Panel; Future  -     CBC; Future  4. Persistent atrial fibrillation (HCC)  -     TSH; Future  -     T4, Free; Future  5. Secondary hypercoagulable state (HCC)  6. History of heart artery stent  7. Hyperlipidemia, unspecified hyperlipidemia type  -     atorvastatin (LIPITOR) 40 MG tablet; Take 1 tablet by mouth daily, Disp-90 tablet, R-3Normal  8. Erectile dysfunction, unspecified erectile dysfunction type      Diabetes controlled, still having hypoglycemic episodes especially at night, decrease Levemir dose to 25 units at night, continue 30 units in the morning.  Continue metformin and Rybelsus.  He will watch for low blood sugars.  Results for orders placed or performed in visit on 10/15/24   AMB POC HEMOGLOBIN A1C   Result Value Ref Range    Hemoglobin A1C, POC 5.9 %     Blood work as above  His amiodarone was stopped due to elevated liver tests, he had recent blood work and liver tests have normalized     UTD with cologuard, still needs to schedule low dose ct chest   He has seen dermatology and ophthalmology.  He says his cardiologist told him it was okay to take 50 mg of Viagra, does not want prescription.    Return in about 4 months (around 2/15/2025) for follow up.         SUBJECTIVE/OBJECTIVE:  ЮЛИЯ Salcedo

## 2024-10-15 NOTE — PROGRESS NOTES
\"Have you been to the ER, urgent care clinic since your last visit?  Hospitalized since your last visit?\"    NO    “Have you seen or consulted any other health care providers outside of [facility] since your last visit?”    YES - When: approximately Sep 2024 ago.  Where and Why: [facility] for an eye exam.      Chief Complaint   Patient presents with    Follow-up Chronic Condition     /61 (Site: Left Upper Arm, Position: Sitting, Cuff Size: Medium Adult)   Pulse 75   Temp 98.2 °F (36.8 °C) (Temporal)   Resp 18   Ht 1.829 m (6')   Wt 105.1 kg (231 lb 9.6 oz)   SpO2 91%   BMI 31.41 kg/m²         Click Here for Release of Records Request

## 2024-10-16 LAB
ALBUMIN SERPL-MCNC: 4.1 G/DL (ref 3.8–4.8)
ALP SERPL-CCNC: 67 IU/L (ref 44–121)
ALT SERPL-CCNC: 13 IU/L (ref 0–44)
AST SERPL-CCNC: 17 IU/L (ref 0–40)
BILIRUB SERPL-MCNC: 0.6 MG/DL (ref 0–1.2)
BUN SERPL-MCNC: 14 MG/DL (ref 8–27)
BUN/CREAT SERPL: 15 (ref 10–24)
CALCIUM SERPL-MCNC: 8.5 MG/DL (ref 8.6–10.2)
CHLORIDE SERPL-SCNC: 100 MMOL/L (ref 96–106)
CO2 SERPL-SCNC: 28 MMOL/L (ref 20–29)
CREAT SERPL-MCNC: 0.95 MG/DL (ref 0.76–1.27)
EGFRCR SERPLBLD CKD-EPI 2021: 83 ML/MIN/1.73
ERYTHROCYTE [DISTWIDTH] IN BLOOD BY AUTOMATED COUNT: 14.1 % (ref 11.6–15.4)
GLOBULIN SER CALC-MCNC: 2.6 G/DL (ref 1.5–4.5)
GLUCOSE SERPL-MCNC: 110 MG/DL (ref 70–99)
HCT VFR BLD AUTO: 37.7 % (ref 37.5–51)
HGB BLD-MCNC: 11.9 G/DL (ref 13–17.7)
MCH RBC QN AUTO: 27.4 PG (ref 26.6–33)
MCHC RBC AUTO-ENTMCNC: 31.6 G/DL (ref 31.5–35.7)
MCV RBC AUTO: 87 FL (ref 79–97)
PLATELET # BLD AUTO: 193 X10E3/UL (ref 150–450)
POTASSIUM SERPL-SCNC: 4.2 MMOL/L (ref 3.5–5.2)
PROT SERPL-MCNC: 6.7 G/DL (ref 6–8.5)
RBC # BLD AUTO: 4.35 X10E6/UL (ref 4.14–5.8)
SODIUM SERPL-SCNC: 143 MMOL/L (ref 134–144)
T4 FREE SERPL-MCNC: 1.32 NG/DL (ref 0.82–1.77)
TSH SERPL DL<=0.005 MIU/L-ACNC: 5.42 UIU/ML (ref 0.45–4.5)
WBC # BLD AUTO: 7.4 X10E3/UL (ref 3.4–10.8)

## 2024-11-13 ENCOUNTER — TELEPHONE (OUTPATIENT)
Facility: CLINIC | Age: 74
End: 2024-11-13

## 2024-11-13 NOTE — TELEPHONE ENCOUNTER
The patient came to the office and wants to know if it is safe for him to take these supplements and also he wants to know if it is safe for the patient to take the supplements at the same time.     Vital Surge Testo Gummies formulated with Muira Puama Bark and Fenugreek    Vital Surge NO2 Boost Gummies formulated with L- Arginene (AAKG) and L-Citrulline

## 2024-12-04 LAB — LEFT VENTRICULAR EJECTION FRACTION, EXTERNAL: NORMAL

## 2025-02-17 DIAGNOSIS — E11.69 TYPE 2 DIABETES MELLITUS WITH OTHER SPECIFIED COMPLICATION, WITH LONG-TERM CURRENT USE OF INSULIN (HCC): ICD-10-CM

## 2025-02-17 DIAGNOSIS — Z79.4 TYPE 2 DIABETES MELLITUS WITH OTHER SPECIFIED COMPLICATION, WITH LONG-TERM CURRENT USE OF INSULIN (HCC): ICD-10-CM

## 2025-02-18 RX ORDER — ORAL SEMAGLUTIDE 14 MG/1
TABLET ORAL
Qty: 90 TABLET | Refills: 1 | Status: SHIPPED | OUTPATIENT
Start: 2025-02-18

## 2025-02-23 DIAGNOSIS — I25.10 CORONARY ARTERY DISEASE INVOLVING NATIVE HEART, UNSPECIFIED VESSEL OR LESION TYPE, UNSPECIFIED WHETHER ANGINA PRESENT: ICD-10-CM

## 2025-02-23 DIAGNOSIS — E11.69 TYPE 2 DIABETES MELLITUS WITH OTHER SPECIFIED COMPLICATION, WITH LONG-TERM CURRENT USE OF INSULIN (HCC): ICD-10-CM

## 2025-02-23 DIAGNOSIS — I10 ESSENTIAL HYPERTENSION: ICD-10-CM

## 2025-02-23 DIAGNOSIS — Z79.4 TYPE 2 DIABETES MELLITUS WITH OTHER SPECIFIED COMPLICATION, WITH LONG-TERM CURRENT USE OF INSULIN (HCC): ICD-10-CM

## 2025-02-24 ENCOUNTER — TELEPHONE (OUTPATIENT)
Facility: CLINIC | Age: 75
End: 2025-02-24

## 2025-02-24 DIAGNOSIS — E11.65 TYPE 2 DIABETES MELLITUS WITH HYPERGLYCEMIA, WITH LONG-TERM CURRENT USE OF INSULIN (HCC): ICD-10-CM

## 2025-02-24 DIAGNOSIS — Z79.4 TYPE 2 DIABETES MELLITUS WITH HYPERGLYCEMIA, WITH LONG-TERM CURRENT USE OF INSULIN (HCC): ICD-10-CM

## 2025-02-24 DIAGNOSIS — M79.671 PAIN IN BOTH FEET: Primary | ICD-10-CM

## 2025-02-24 DIAGNOSIS — M79.672 PAIN IN BOTH FEET: Primary | ICD-10-CM

## 2025-02-25 RX ORDER — LOSARTAN POTASSIUM 25 MG/1
25 TABLET ORAL DAILY
Qty: 90 TABLET | Refills: 1 | Status: SHIPPED | OUTPATIENT
Start: 2025-02-25

## 2025-02-25 RX ORDER — FUROSEMIDE 20 MG/1
20 TABLET ORAL DAILY
Qty: 90 TABLET | Refills: 1 | Status: SHIPPED | OUTPATIENT
Start: 2025-02-25

## 2025-03-03 DIAGNOSIS — Z79.4 TYPE 2 DIABETES MELLITUS WITH DIABETIC POLYNEUROPATHY, WITH LONG-TERM CURRENT USE OF INSULIN (HCC): ICD-10-CM

## 2025-03-03 DIAGNOSIS — E11.42 TYPE 2 DIABETES MELLITUS WITH DIABETIC POLYNEUROPATHY, WITH LONG-TERM CURRENT USE OF INSULIN (HCC): ICD-10-CM

## 2025-03-03 RX ORDER — ACYCLOVIR 400 MG/1
TABLET ORAL
Qty: 3 EACH | Refills: 3 | Status: SHIPPED | OUTPATIENT
Start: 2025-03-03

## 2025-03-06 ENCOUNTER — TELEPHONE (OUTPATIENT)
Facility: CLINIC | Age: 75
End: 2025-03-06

## 2025-03-06 NOTE — TELEPHONE ENCOUNTER
Patient's niece called stating that the prescription for RYBELSUS 14 MG TABS is going to cost him $600 out of pocket.    Is there something else you can prescribe for him to take that would be cheaper.    Please advise.

## 2025-03-09 ENCOUNTER — ANESTHESIA EVENT (OUTPATIENT)
Facility: HOSPITAL | Age: 75
End: 2025-03-09
Payer: MEDICARE

## 2025-03-10 ENCOUNTER — ANESTHESIA (OUTPATIENT)
Facility: HOSPITAL | Age: 75
End: 2025-03-10
Payer: MEDICARE

## 2025-03-10 ENCOUNTER — HOSPITAL ENCOUNTER (OUTPATIENT)
Facility: HOSPITAL | Age: 75
Discharge: HOME | End: 2025-03-12
Attending: INTERNAL MEDICINE | Admitting: INTERNAL MEDICINE
Payer: MEDICARE

## 2025-03-10 VITALS
HEART RATE: 83 BPM | RESPIRATION RATE: 18 BRPM | DIASTOLIC BLOOD PRESSURE: 78 MMHG | OXYGEN SATURATION: 94 % | SYSTOLIC BLOOD PRESSURE: 131 MMHG | TEMPERATURE: 97.9 F

## 2025-03-10 DIAGNOSIS — I48.91 A-FIB (HCC): ICD-10-CM

## 2025-03-10 LAB
ALBUMIN SERPL-MCNC: 3.5 G/DL (ref 3.5–5)
ALBUMIN/GLOB SERPL: 1 (ref 1.1–2.2)
ALP SERPL-CCNC: 82 U/L (ref 45–117)
ALT SERPL-CCNC: 17 U/L (ref 12–78)
ANION GAP SERPL CALC-SCNC: 3 MMOL/L (ref 2–12)
APTT PPP: 39.9 SEC (ref 21.2–34.1)
AST SERPL W P-5'-P-CCNC: 19 U/L (ref 15–37)
BILIRUB SERPL-MCNC: 0.9 MG/DL (ref 0.2–1)
BUN SERPL-MCNC: 15 MG/DL (ref 6–20)
BUN/CREAT SERPL: 16 (ref 12–20)
CA-I BLD-MCNC: 8.2 MG/DL (ref 8.5–10.1)
CHLORIDE SERPL-SCNC: 107 MMOL/L (ref 97–108)
CO2 SERPL-SCNC: 33 MMOL/L (ref 21–32)
CREAT SERPL-MCNC: 0.93 MG/DL (ref 0.7–1.3)
EKG ATRIAL RATE: 46 BPM
EKG DIAGNOSIS: NORMAL
EKG Q-T INTERVAL: 384 MS
EKG QRS DURATION: 94 MS
EKG QTC CALCULATION (BAZETT): 453 MS
EKG R AXIS: 2 DEGREES
EKG T AXIS: 54 DEGREES
EKG VENTRICULAR RATE: 84 BPM
ERYTHROCYTE [DISTWIDTH] IN BLOOD BY AUTOMATED COUNT: 14.6 % (ref 11.5–14.5)
GLOBULIN SER CALC-MCNC: 3.6 G/DL (ref 2–4)
GLUCOSE BLD STRIP.AUTO-MCNC: 112 MG/DL (ref 65–100)
GLUCOSE SERPL-MCNC: 102 MG/DL (ref 65–100)
HCT VFR BLD AUTO: 38 % (ref 36.6–50.3)
HGB BLD-MCNC: 12.1 G/DL (ref 12.1–17)
INR PPP: 1.4 (ref 0.9–1.1)
MCH RBC QN AUTO: 26.9 PG (ref 26–34)
MCHC RBC AUTO-ENTMCNC: 31.8 G/DL (ref 30–36.5)
MCV RBC AUTO: 84.4 FL (ref 80–99)
NRBC # BLD: 0 K/UL (ref 0–0.01)
NRBC BLD-RTO: 0 PER 100 WBC
PERFORMED BY:: ABNORMAL
PLATELET # BLD AUTO: 219 K/UL (ref 150–400)
PMV BLD AUTO: 10.5 FL (ref 8.9–12.9)
POTASSIUM SERPL-SCNC: 3.9 MMOL/L (ref 3.5–5.1)
PROT SERPL-MCNC: 7.1 G/DL (ref 6.4–8.2)
PROTHROMBIN TIME: 17.1 SEC (ref 11.9–14.6)
RBC # BLD AUTO: 4.5 M/UL (ref 4.1–5.7)
SODIUM SERPL-SCNC: 143 MMOL/L (ref 136–145)
THERAPEUTIC RANGE: ABNORMAL SEC (ref 82–109)
WBC # BLD AUTO: 7.4 K/UL (ref 4.1–11.1)

## 2025-03-10 PROCEDURE — 7100000011 HC PHASE II RECOVERY - ADDTL 15 MIN

## 2025-03-10 PROCEDURE — 7100000010 HC PHASE II RECOVERY - FIRST 15 MIN

## 2025-03-10 PROCEDURE — 85027 COMPLETE CBC AUTOMATED: CPT

## 2025-03-10 PROCEDURE — 6360000002 HC RX W HCPCS

## 2025-03-10 PROCEDURE — 82962 GLUCOSE BLOOD TEST: CPT

## 2025-03-10 PROCEDURE — 36415 COLL VENOUS BLD VENIPUNCTURE: CPT

## 2025-03-10 PROCEDURE — 3700000000 HC ANESTHESIA ATTENDED CARE

## 2025-03-10 PROCEDURE — 92960 CARDIOVERSION ELECTRIC EXT: CPT

## 2025-03-10 PROCEDURE — 93005 ELECTROCARDIOGRAM TRACING: CPT | Performed by: INTERNAL MEDICINE

## 2025-03-10 PROCEDURE — 85610 PROTHROMBIN TIME: CPT

## 2025-03-10 PROCEDURE — 85730 THROMBOPLASTIN TIME PARTIAL: CPT

## 2025-03-10 PROCEDURE — 80053 COMPREHEN METABOLIC PANEL: CPT

## 2025-03-10 PROCEDURE — 2580000003 HC RX 258

## 2025-03-10 RX ORDER — SODIUM CHLORIDE 0.9 % (FLUSH) 0.9 %
5-40 SYRINGE (ML) INJECTION EVERY 12 HOURS SCHEDULED
Status: CANCELLED | OUTPATIENT
Start: 2025-03-10

## 2025-03-10 RX ORDER — SODIUM CHLORIDE, SODIUM LACTATE, POTASSIUM CHLORIDE, CALCIUM CHLORIDE 600; 310; 30; 20 MG/100ML; MG/100ML; MG/100ML; MG/100ML
INJECTION, SOLUTION INTRAVENOUS ONCE
Status: CANCELLED | OUTPATIENT
Start: 2025-03-10 | End: 2025-03-10

## 2025-03-10 RX ORDER — SODIUM CHLORIDE 0.9 % (FLUSH) 0.9 %
5-40 SYRINGE (ML) INJECTION PRN
Status: CANCELLED | OUTPATIENT
Start: 2025-03-10

## 2025-03-10 RX ORDER — LABETALOL HYDROCHLORIDE 5 MG/ML
10 INJECTION, SOLUTION INTRAVENOUS
Status: CANCELLED | OUTPATIENT
Start: 2025-03-10

## 2025-03-10 RX ORDER — IPRATROPIUM BROMIDE AND ALBUTEROL SULFATE 2.5; .5 MG/3ML; MG/3ML
1 SOLUTION RESPIRATORY (INHALATION)
Status: CANCELLED | OUTPATIENT
Start: 2025-03-10 | End: 2025-03-11

## 2025-03-10 RX ORDER — NALOXONE HYDROCHLORIDE 0.4 MG/ML
INJECTION, SOLUTION INTRAMUSCULAR; INTRAVENOUS; SUBCUTANEOUS PRN
Status: CANCELLED | OUTPATIENT
Start: 2025-03-10

## 2025-03-10 RX ORDER — SODIUM CHLORIDE 9 MG/ML
INJECTION, SOLUTION INTRAVENOUS
Status: DISCONTINUED | OUTPATIENT
Start: 2025-03-10 | End: 2025-03-10 | Stop reason: SDUPTHER

## 2025-03-10 RX ORDER — DEXTROSE MONOHYDRATE 100 MG/ML
INJECTION, SOLUTION INTRAVENOUS CONTINUOUS PRN
Status: CANCELLED | OUTPATIENT
Start: 2025-03-10

## 2025-03-10 RX ORDER — OXYCODONE HYDROCHLORIDE 5 MG/1
10 TABLET ORAL PRN
Refills: 0 | Status: CANCELLED | OUTPATIENT
Start: 2025-03-10 | End: 2025-03-10

## 2025-03-10 RX ORDER — GLUCAGON 1 MG/ML
1 KIT INJECTION PRN
Status: CANCELLED | OUTPATIENT
Start: 2025-03-10

## 2025-03-10 RX ORDER — HYDRALAZINE HYDROCHLORIDE 20 MG/ML
10 INJECTION INTRAMUSCULAR; INTRAVENOUS
Status: CANCELLED | OUTPATIENT
Start: 2025-03-10

## 2025-03-10 RX ORDER — OXYCODONE HYDROCHLORIDE 5 MG/1
5 TABLET ORAL PRN
Refills: 0 | Status: CANCELLED | OUTPATIENT
Start: 2025-03-10 | End: 2025-03-10

## 2025-03-10 RX ORDER — SODIUM CHLORIDE 9 MG/ML
INJECTION, SOLUTION INTRAVENOUS PRN
Status: CANCELLED | OUTPATIENT
Start: 2025-03-10

## 2025-03-10 RX ORDER — LIDOCAINE 4 G/G
1 PATCH TOPICAL AS NEEDED
Status: CANCELLED | OUTPATIENT
Start: 2025-03-10

## 2025-03-10 RX ORDER — FENTANYL CITRATE 0.05 MG/ML
50 INJECTION, SOLUTION INTRAMUSCULAR; INTRAVENOUS EVERY 5 MIN PRN
Refills: 0 | Status: CANCELLED | OUTPATIENT
Start: 2025-03-10

## 2025-03-10 RX ORDER — PROPOFOL 10 MG/ML
INJECTION, EMULSION INTRAVENOUS
Status: DISCONTINUED | OUTPATIENT
Start: 2025-03-10 | End: 2025-03-10 | Stop reason: SDUPTHER

## 2025-03-10 RX ORDER — METOCLOPRAMIDE HYDROCHLORIDE 5 MG/ML
10 INJECTION INTRAMUSCULAR; INTRAVENOUS
Status: CANCELLED | OUTPATIENT
Start: 2025-03-10 | End: 2025-03-11

## 2025-03-10 RX ORDER — MEPERIDINE HYDROCHLORIDE 25 MG/ML
12.5 INJECTION INTRAMUSCULAR; INTRAVENOUS; SUBCUTANEOUS EVERY 5 MIN PRN
Refills: 0 | Status: CANCELLED | OUTPATIENT
Start: 2025-03-10

## 2025-03-10 RX ORDER — LORAZEPAM 2 MG/ML
0.5 INJECTION INTRAMUSCULAR
Status: CANCELLED | OUTPATIENT
Start: 2025-03-10 | End: 2025-03-11

## 2025-03-10 RX ORDER — HYDROMORPHONE HYDROCHLORIDE 1 MG/ML
0.5 INJECTION, SOLUTION INTRAMUSCULAR; INTRAVENOUS; SUBCUTANEOUS EVERY 5 MIN PRN
Refills: 0 | Status: CANCELLED | OUTPATIENT
Start: 2025-03-10

## 2025-03-10 RX ORDER — DIPHENHYDRAMINE HYDROCHLORIDE 50 MG/ML
12.5 INJECTION INTRAMUSCULAR; INTRAVENOUS
Status: CANCELLED | OUTPATIENT
Start: 2025-03-10 | End: 2025-03-11

## 2025-03-10 RX ORDER — ONDANSETRON 2 MG/ML
4 INJECTION INTRAMUSCULAR; INTRAVENOUS
Status: CANCELLED | OUTPATIENT
Start: 2025-03-10 | End: 2025-03-11

## 2025-03-10 RX ADMIN — SODIUM CHLORIDE: 9 INJECTION, SOLUTION INTRAVENOUS at 13:10

## 2025-03-10 RX ADMIN — PROPOFOL 20 MG: 10 INJECTION, EMULSION INTRAVENOUS at 13:19

## 2025-03-10 RX ADMIN — PROPOFOL 40 MG: 10 INJECTION, EMULSION INTRAVENOUS at 13:14

## 2025-03-10 RX ADMIN — PROPOFOL 30 MG: 10 INJECTION, EMULSION INTRAVENOUS at 13:16

## 2025-03-10 ASSESSMENT — PAIN - FUNCTIONAL ASSESSMENT
PAIN_FUNCTIONAL_ASSESSMENT: 0-10

## 2025-03-10 ASSESSMENT — PAIN SCALES - GENERAL
PAINLEVEL_OUTOF10: 0

## 2025-03-10 NOTE — ANESTHESIA PRE PROCEDURE
Cardiovascular:    (+) hypertension:, CAD:, CABG/stent: no interval change, dysrhythmias: atrial fibrillation      ECG reviewed  Rhythm: irregular  Rate: abnormal  Echocardiogram reviewed                  Neuro/Psych:   Negative Neuro/Psych ROS               ROS comment: Diabetic neuropathy GI/Hepatic/Renal:             Endo/Other:    (+) DiabetesType II DM, : arthritis: OA..                 Abdominal:       Abdomen: soft.      Vascular:   + PVD, aortic or cerebral.       Other Findings:           Anesthesia Plan      MAC and TIVA     ASA 3     (Standard ASA Monitors)  Induction: intravenous.      Anesthetic plan and risks discussed with patient.      Plan discussed with CRNA.    Attending anesthesiologist reviewed and agrees with Preprocedure content              Maxi Mcdaniels MD   3/10/2025

## 2025-03-10 NOTE — PERIOP NOTE
Patient alert and oriented x 4 with respirations even and unlabored on RA. Patient discharged home per physician's order. Patient transported via wheelchair to front hospital entrance with patient's niece present to transport patient via private vehicle.

## 2025-03-10 NOTE — H&P
Reason for Visit  atrial fibrillation  Assessment & Plan  atrial fibrillation  He is s/p ablation and DCCV.   Currently he is in atrial fibrillation  Patient is stating that he is taking amiodrone, had elevated LFTs in the past. Will repeat LFts.   Continue noac - no bleeding issues.   Patient has Chadsvasc score of 5 (age, HTN, CAD, DM)  Patient states that he not having any major bleeding and states and he has not fallen, hence he is not interested in the procedure any more.   Continue Eliquis 5 mg bid  continue amiodrone 200 mg daily  continue coreg      Order cardioversion (PROC)  coronary arteriosclerosis  Prior stent to LAD. Symptoms are stable. Stress test did not show any ischemia. Continue medical management. QF is normal.  hypertensive disorder  Blood pressure is at goal, continue current therapy.         hyperlipidemia  We will continue the statin therapy as prescribed.   tricuspid valve regurgitation  Mild, will continue to monitor.  long-term current use of anticoagulant  Patient is tolerating AC without any issues. No major or minor bleeding is noted. Patient is not being identified as high risk for faSll. We will continue the current dose of anticoagulation.   long-term current use of amiodarone    History of Present Illness  Arrhythmias  He denies having chest pain and shortness of breath. He states that he is still feeling fatigue and some dizziness. He reports that it is becoming better. He denies having palpation's. He denies having nausea, vomiting and edema.   Review of Systems  ROS as noted in the HPI  Screening  None recorded  Physical Exam  Brief Cardiology Exam    Basic Cardio PE  General Appearance no acute distress, healthy-appearing, healthy weight, alert  Neck: normal thyroid, no bruit, no jugular vein distension, Kussmaul's sign absent  Lungs: clear to auscultation  Cardio: S1 normal, S2 normal, S3 normal, S4 normal, no murmurs, no gallops, normal apical impulse  Abdomen: soft, non

## 2025-03-11 DIAGNOSIS — E11.42 TYPE 2 DIABETES MELLITUS WITH DIABETIC POLYNEUROPATHY, WITH LONG-TERM CURRENT USE OF INSULIN (HCC): Primary | ICD-10-CM

## 2025-03-11 DIAGNOSIS — Z79.4 TYPE 2 DIABETES MELLITUS WITH DIABETIC POLYNEUROPATHY, WITH LONG-TERM CURRENT USE OF INSULIN (HCC): Primary | ICD-10-CM

## 2025-03-11 LAB
EKG ATRIAL RATE: 73 BPM
EKG DIAGNOSIS: NORMAL
EKG P AXIS: 89 DEGREES
EKG P-R INTERVAL: 212 MS
EKG Q-T INTERVAL: 400 MS
EKG QRS DURATION: 94 MS
EKG QTC CALCULATION (BAZETT): 440 MS
EKG R AXIS: 1 DEGREES
EKG T AXIS: 55 DEGREES
EKG VENTRICULAR RATE: 73 BPM

## 2025-03-11 RX ORDER — SEMAGLUTIDE 1.34 MG/ML
INJECTION, SOLUTION SUBCUTANEOUS
Qty: 9 ML | Refills: 0 | Status: SHIPPED | OUTPATIENT
Start: 2025-03-11 | End: 2025-06-09

## 2025-03-12 ENCOUNTER — TELEPHONE (OUTPATIENT)
Facility: CLINIC | Age: 75
End: 2025-03-12

## 2025-03-12 NOTE — TELEPHONE ENCOUNTER
The patient came to the office and stated that he has a $600 deductible for Ozempic and wants to know if he has to take this?

## 2025-03-20 ENCOUNTER — TELEPHONE (OUTPATIENT)
Facility: CLINIC | Age: 75
End: 2025-03-20

## 2025-03-20 NOTE — TELEPHONE ENCOUNTER
Attempted to contact patient regarding upcoming Medicare wellness appointment and completion of HRA questionnaire. LVM for patient to please return call at [phone].

## 2025-03-21 ENCOUNTER — TELEPHONE (OUTPATIENT)
Facility: CLINIC | Age: 75
End: 2025-03-21

## 2025-03-21 NOTE — TELEPHONE ENCOUNTER
Attempted to contact patient regarding upcoming Medicare wellness appointment and completion of HRA questionnaire. LVM for patient to please return call at [phone].

## 2025-03-24 ENCOUNTER — OFFICE VISIT (OUTPATIENT)
Facility: CLINIC | Age: 75
End: 2025-03-24
Payer: MEDICARE

## 2025-03-24 VITALS
OXYGEN SATURATION: 95 % | TEMPERATURE: 97.7 F | BODY MASS INDEX: 30.18 KG/M2 | DIASTOLIC BLOOD PRESSURE: 76 MMHG | HEIGHT: 72 IN | SYSTOLIC BLOOD PRESSURE: 139 MMHG | WEIGHT: 222.8 LBS | HEART RATE: 72 BPM | RESPIRATION RATE: 18 BRPM

## 2025-03-24 DIAGNOSIS — E78.5 HYPERLIPIDEMIA, UNSPECIFIED HYPERLIPIDEMIA TYPE: ICD-10-CM

## 2025-03-24 DIAGNOSIS — I25.10 CORONARY ARTERY DISEASE INVOLVING NATIVE HEART, UNSPECIFIED VESSEL OR LESION TYPE, UNSPECIFIED WHETHER ANGINA PRESENT: ICD-10-CM

## 2025-03-24 DIAGNOSIS — Z13.6 ENCOUNTER FOR ABDOMINAL AORTIC ANEURYSM (AAA) SCREENING: ICD-10-CM

## 2025-03-24 DIAGNOSIS — Z00.00 MEDICARE ANNUAL WELLNESS VISIT, SUBSEQUENT: Primary | ICD-10-CM

## 2025-03-24 DIAGNOSIS — E11.65 TYPE 2 DIABETES MELLITUS WITH HYPERGLYCEMIA, WITH LONG-TERM CURRENT USE OF INSULIN (HCC): ICD-10-CM

## 2025-03-24 DIAGNOSIS — Z79.4 TYPE 2 DIABETES MELLITUS WITH HYPERGLYCEMIA, WITH LONG-TERM CURRENT USE OF INSULIN (HCC): ICD-10-CM

## 2025-03-24 DIAGNOSIS — I10 ESSENTIAL HYPERTENSION: ICD-10-CM

## 2025-03-24 DIAGNOSIS — E83.51 HYPOCALCEMIA: ICD-10-CM

## 2025-03-24 DIAGNOSIS — I48.19 PERSISTENT ATRIAL FIBRILLATION (HCC): ICD-10-CM

## 2025-03-24 DIAGNOSIS — Z87.891 PERSONAL HISTORY OF NICOTINE DEPENDENCE: ICD-10-CM

## 2025-03-24 DIAGNOSIS — Z87.891 PERSONAL HISTORY OF TOBACCO USE: ICD-10-CM

## 2025-03-24 LAB — HBA1C MFR BLD: 5.5 %

## 2025-03-24 PROCEDURE — 3078F DIAST BP <80 MM HG: CPT | Performed by: STUDENT IN AN ORGANIZED HEALTH CARE EDUCATION/TRAINING PROGRAM

## 2025-03-24 PROCEDURE — 83036 HEMOGLOBIN GLYCOSYLATED A1C: CPT | Performed by: STUDENT IN AN ORGANIZED HEALTH CARE EDUCATION/TRAINING PROGRAM

## 2025-03-24 PROCEDURE — 1159F MED LIST DOCD IN RCRD: CPT | Performed by: STUDENT IN AN ORGANIZED HEALTH CARE EDUCATION/TRAINING PROGRAM

## 2025-03-24 PROCEDURE — 99214 OFFICE O/P EST MOD 30 MIN: CPT | Performed by: STUDENT IN AN ORGANIZED HEALTH CARE EDUCATION/TRAINING PROGRAM

## 2025-03-24 PROCEDURE — 3044F HG A1C LEVEL LT 7.0%: CPT | Performed by: STUDENT IN AN ORGANIZED HEALTH CARE EDUCATION/TRAINING PROGRAM

## 2025-03-24 PROCEDURE — 1123F ACP DISCUSS/DSCN MKR DOCD: CPT | Performed by: STUDENT IN AN ORGANIZED HEALTH CARE EDUCATION/TRAINING PROGRAM

## 2025-03-24 PROCEDURE — 1160F RVW MEDS BY RX/DR IN RCRD: CPT | Performed by: STUDENT IN AN ORGANIZED HEALTH CARE EDUCATION/TRAINING PROGRAM

## 2025-03-24 PROCEDURE — G0439 PPPS, SUBSEQ VISIT: HCPCS | Performed by: STUDENT IN AN ORGANIZED HEALTH CARE EDUCATION/TRAINING PROGRAM

## 2025-03-24 PROCEDURE — 3075F SYST BP GE 130 - 139MM HG: CPT | Performed by: STUDENT IN AN ORGANIZED HEALTH CARE EDUCATION/TRAINING PROGRAM

## 2025-03-24 PROCEDURE — 1126F AMNT PAIN NOTED NONE PRSNT: CPT | Performed by: STUDENT IN AN ORGANIZED HEALTH CARE EDUCATION/TRAINING PROGRAM

## 2025-03-24 PROCEDURE — G0296 VISIT TO DETERM LDCT ELIG: HCPCS | Performed by: STUDENT IN AN ORGANIZED HEALTH CARE EDUCATION/TRAINING PROGRAM

## 2025-03-24 RX ORDER — CARVEDILOL 12.5 MG/1
12.5 TABLET ORAL 2 TIMES DAILY WITH MEALS
Qty: 180 TABLET | Refills: 1 | Status: SHIPPED | OUTPATIENT
Start: 2025-03-24

## 2025-03-24 RX ORDER — AMLODIPINE BESYLATE 10 MG/1
10 TABLET ORAL DAILY
Qty: 90 TABLET | Refills: 1 | Status: SHIPPED | OUTPATIENT
Start: 2025-03-24

## 2025-03-24 RX ORDER — ATORVASTATIN CALCIUM 40 MG/1
40 TABLET, FILM COATED ORAL DAILY
Qty: 90 TABLET | Refills: 3 | Status: SHIPPED | OUTPATIENT
Start: 2025-03-24

## 2025-03-24 RX ORDER — PEN NEEDLE, DIABETIC 32GX 5/32"
NEEDLE, DISPOSABLE MISCELLANEOUS
Qty: 300 EACH | Refills: 2 | Status: SHIPPED | OUTPATIENT
Start: 2025-03-24

## 2025-03-24 ASSESSMENT — LIFESTYLE VARIABLES
HOW OFTEN DO YOU HAVE A DRINK CONTAINING ALCOHOL: 2-3 TIMES A WEEK
HOW MANY STANDARD DRINKS CONTAINING ALCOHOL DO YOU HAVE ON A TYPICAL DAY: 1 OR 2

## 2025-03-24 ASSESSMENT — PATIENT HEALTH QUESTIONNAIRE - PHQ9
2. FEELING DOWN, DEPRESSED OR HOPELESS: NOT AT ALL
SUM OF ALL RESPONSES TO PHQ QUESTIONS 1-9: 0
1. LITTLE INTEREST OR PLEASURE IN DOING THINGS: NOT AT ALL
SUM OF ALL RESPONSES TO PHQ QUESTIONS 1-9: 0

## 2025-03-24 NOTE — PROGRESS NOTES
Medicare Annual Wellness Visit    Matias Pacheco is here for Medicare AWV and Follow-up Chronic Condition    Assessment & Plan   Medicare annual wellness visit, subsequent  Type 2 diabetes mellitus with hyperglycemia, with long-term current use of insulin (HCC)  -     AMB POC HEMOGLOBIN A1C  -     Albumin/Creatinine Ratio, Urine; Future  -     BD PEN NEEDLE YENY 2ND GEN 32G X 4 MM MISC; USE BY SUBCUTANEOUS ROUTE TWO (2) TIMES A DAY. DX E11.9, Disp-300 each, R-2, DAWNormal  Personal history of tobacco use  -     SC VISIT TO DISCUSS LUNG CA SCREEN W LDCT  -     CT Lung Screen (Initial/Annual/Baseline); Future  Essential hypertension  -     TSH; Future  -     T4, Free; Future  -     amLODIPine (NORVASC) 10 MG tablet; Take 1 tablet by mouth daily, Disp-90 tablet, R-1Normal  -     carvedilol (COREG) 12.5 MG tablet; Take 1 tablet by mouth with breakfast and with evening meal, Disp-180 tablet, R-1Normal  Persistent atrial fibrillation (HCC)  -     carvedilol (COREG) 12.5 MG tablet; Take 1 tablet by mouth with breakfast and with evening meal, Disp-180 tablet, R-1Normal  Coronary artery disease involving native heart, unspecified vessel or lesion type, unspecified whether angina present  Hypocalcemia  -     Vitamin D 25 Hydroxy; Future  Hyperlipidemia, unspecified hyperlipidemia type  -     Lipid Panel; Future  -     atorvastatin (LIPITOR) 40 MG tablet; Take 1 tablet by mouth daily, Disp-90 tablet, R-3Normal  Encounter for abdominal aortic aneurysm (AAA) screening  -     Vascular AAA screening; Future  Personal history of nicotine dependence  -     Vascular AAA screening; Future  Diabetes: A1c is good, recently stopped taking Rybelsus as the medication is not covered by his insurance anymore.  So for now continue current regimen.  Average glucose 120, GMI 6.2 30 DAY report, 1% very low/low   Results for orders placed or performed in visit on 03/24/25   AMB POC HEMOGLOBIN A1C   Result Value Ref Range    Hemoglobin A1C, POC

## 2025-03-26 LAB
25(OH)D3+25(OH)D2 SERPL-MCNC: 22.8 NG/ML (ref 30–100)
ALBUMIN/CREAT UR: 15 MG/G CREAT (ref 0–29)
CHOLEST SERPL-MCNC: 123 MG/DL (ref 100–199)
CREAT UR-MCNC: 137.6 MG/DL
HDLC SERPL-MCNC: 28 MG/DL
LDLC SERPL CALC-MCNC: 80 MG/DL (ref 0–99)
MICROALBUMIN UR-MCNC: 20.8 UG/ML
TRIGL SERPL-MCNC: 71 MG/DL (ref 0–149)
VLDLC SERPL CALC-MCNC: 15 MG/DL (ref 5–40)

## 2025-03-27 LAB
T4 FREE SERPL-MCNC: 1.35 NG/DL (ref 0.82–1.77)
TSH SERPL DL<=0.005 MIU/L-ACNC: 4.5 UIU/ML (ref 0.45–4.5)

## 2025-04-06 ENCOUNTER — RESULTS FOLLOW-UP (OUTPATIENT)
Facility: CLINIC | Age: 75
End: 2025-04-06

## 2025-04-06 DIAGNOSIS — E55.9 VITAMIN D DEFICIENCY: Primary | ICD-10-CM

## 2025-04-06 RX ORDER — CHOLECALCIFEROL (VITAMIN D3) 25 MCG
1000 TABLET ORAL DAILY
Qty: 90 TABLET | Refills: 1 | Status: SHIPPED | OUTPATIENT
Start: 2025-04-06

## 2025-04-14 ENCOUNTER — HOSPITAL ENCOUNTER (OUTPATIENT)
Facility: HOSPITAL | Age: 75
Discharge: HOME | End: 2025-04-16
Attending: STUDENT IN AN ORGANIZED HEALTH CARE EDUCATION/TRAINING PROGRAM
Payer: MEDICARE

## 2025-04-14 DIAGNOSIS — Z87.891 PERSONAL HISTORY OF NICOTINE DEPENDENCE: ICD-10-CM

## 2025-04-14 DIAGNOSIS — Z13.6 ENCOUNTER FOR ABDOMINAL AORTIC ANEURYSM (AAA) SCREENING: ICD-10-CM

## 2025-04-14 PROCEDURE — 76706 US ABDL AORTA SCREEN AAA: CPT

## 2025-04-15 ENCOUNTER — RESULTS FOLLOW-UP (OUTPATIENT)
Facility: CLINIC | Age: 75
End: 2025-04-15

## 2025-04-15 LAB
VAS AORTA DIST AP: 1.56 CM
VAS AORTA DIST PSV: 102 CM/S
VAS AORTA DIST TR: 1.37 CM
VAS AORTA MID AP: 2.75 CM
VAS AORTA MID PSV: 95 CM/S
VAS AORTA MID TRANS: 2.09 CM
VAS AORTA PROX AP: 1.68 CM
VAS AORTA PROX PSV: 96.9 CM/S
VAS AORTA PROX TR: 1.39 CM
VAS LEFT COM ILIAC AP: 1.04 CM
VAS LEFT COM ILIAC PROX PSV: 95 CM/S
VAS LEFT COM ILIAC TRANS: 0.91 CM
VAS RIGHT COM ILIAC AP: 1.3 CM
VAS RIGHT COM ILIAC PROX PSV: 110 CM/S
VAS RIGHT COM ILIAC TRANS: 1.17 CM

## 2025-04-17 ENCOUNTER — TELEPHONE (OUTPATIENT)
Facility: CLINIC | Age: 75
End: 2025-04-17

## 2025-04-17 NOTE — TELEPHONE ENCOUNTER
Patient aware of lab results communicated understanding and asked that his pharmacy be changed to [facility] this writer did so. SM LPN

## 2025-04-21 RX ORDER — AMIODARONE HYDROCHLORIDE 200 MG/1
200 TABLET ORAL DAILY
COMMUNITY
Start: 2025-04-02

## 2025-04-21 NOTE — TELEPHONE ENCOUNTER
Patient is requesting a refill for       ELIQUIS 5 MG TABS tablet     RYBELSUS 14 MG TABS     Send to [facility].      He also has questions about his insulin and would like a call back concerning this issue.

## 2025-04-22 RX ORDER — APIXABAN 5 MG/1
5 TABLET, FILM COATED ORAL 2 TIMES DAILY
Qty: 180 TABLET | Refills: 0 | Status: SHIPPED | OUTPATIENT
Start: 2025-04-22 | End: 2025-04-25 | Stop reason: ALTCHOICE

## 2025-04-25 ENCOUNTER — TELEPHONE (OUTPATIENT)
Facility: CLINIC | Age: 75
End: 2025-04-25

## 2025-04-25 DIAGNOSIS — I48.19 PERSISTENT ATRIAL FIBRILLATION (HCC): Primary | ICD-10-CM

## 2025-04-25 NOTE — TELEPHONE ENCOUNTER
Per patient Eliquis is $600 and he cannot afford it, wants to know if there is something else she can take? Hasn't taken in 4 days. Now uses [facility] [address]. Please advise.       Per patient cannot afford rybelsus due to deductible. Expensive, that's why he hasn't been taking it.

## 2025-04-25 NOTE — TELEPHONE ENCOUNTER
Patient advised, wants to know if you're going to adjust insulin in place of Rybelsus. Is currently on 30 qam and 25 at night

## 2025-04-28 PROBLEM — Z13.6 ENCOUNTER FOR ABDOMINAL AORTIC ANEURYSM (AAA) SCREENING: Status: ACTIVE | Noted: 2025-04-28

## 2025-04-28 PROBLEM — Z87.891 PERSONAL HISTORY OF NICOTINE DEPENDENCE: Status: ACTIVE | Noted: 2025-04-28

## 2025-04-30 DIAGNOSIS — Z79.4 TYPE 2 DIABETES MELLITUS WITH HYPERGLYCEMIA, WITH LONG-TERM CURRENT USE OF INSULIN (HCC): ICD-10-CM

## 2025-04-30 DIAGNOSIS — E11.65 TYPE 2 DIABETES MELLITUS WITH HYPERGLYCEMIA, WITH LONG-TERM CURRENT USE OF INSULIN (HCC): ICD-10-CM

## 2025-04-30 DIAGNOSIS — E11.42 TYPE 2 DIABETES MELLITUS WITH DIABETIC POLYNEUROPATHY, WITH LONG-TERM CURRENT USE OF INSULIN (HCC): ICD-10-CM

## 2025-04-30 DIAGNOSIS — Z79.4 TYPE 2 DIABETES MELLITUS WITH DIABETIC POLYNEUROPATHY, WITH LONG-TERM CURRENT USE OF INSULIN (HCC): ICD-10-CM

## 2025-04-30 NOTE — TELEPHONE ENCOUNTER
Patient is requesting a refill on     BD PEN NEEDLE YENY 2ND GEN 32G X 4 MM MISC     Contin[facility]ucose [facility]DEX[facility]ENS[facility]       [facility] to to [facility]

## 2025-05-01 RX ORDER — PEN NEEDLE, DIABETIC 32GX 5/32"
NEEDLE, DISPOSABLE MISCELLANEOUS
Qty: 300 EACH | Refills: 2 | Status: SHIPPED | OUTPATIENT
Start: 2025-05-01

## 2025-05-01 RX ORDER — ACYCLOVIR 400 MG/1
TABLET ORAL
Qty: 3 EACH | Refills: 3 | Status: SHIPPED | OUTPATIENT
Start: 2025-05-01

## 2025-05-28 PROBLEM — Z13.6 ENCOUNTER FOR ABDOMINAL AORTIC ANEURYSM (AAA) SCREENING: Status: RESOLVED | Noted: 2025-04-28 | Resolved: 2025-05-28

## 2025-06-04 DIAGNOSIS — I25.10 CORONARY ARTERY DISEASE INVOLVING NATIVE HEART, UNSPECIFIED VESSEL OR LESION TYPE, UNSPECIFIED WHETHER ANGINA PRESENT: ICD-10-CM

## 2025-06-04 DIAGNOSIS — Z79.4 TYPE 2 DIABETES MELLITUS WITH OTHER SPECIFIED COMPLICATION, WITH LONG-TERM CURRENT USE OF INSULIN (HCC): ICD-10-CM

## 2025-06-04 DIAGNOSIS — E55.9 VITAMIN D DEFICIENCY: ICD-10-CM

## 2025-06-04 DIAGNOSIS — I48.19 PERSISTENT ATRIAL FIBRILLATION (HCC): ICD-10-CM

## 2025-06-04 DIAGNOSIS — E78.5 HYPERLIPIDEMIA, UNSPECIFIED HYPERLIPIDEMIA TYPE: ICD-10-CM

## 2025-06-04 DIAGNOSIS — E11.69 TYPE 2 DIABETES MELLITUS WITH OTHER SPECIFIED COMPLICATION, WITH LONG-TERM CURRENT USE OF INSULIN (HCC): ICD-10-CM

## 2025-06-04 DIAGNOSIS — I10 ESSENTIAL HYPERTENSION: ICD-10-CM

## 2025-06-04 NOTE — TELEPHONE ENCOUNTER
Patient stated that his blood sugar is running high and his feet are swelling and if his insulin can be adjusted. Please advise.

## 2025-06-05 NOTE — PERIOP NOTES
10/26 @ 0900: PC to pt regarding positive nasal cx (MSSA) and need to start Mupirocin ointment BID x 7 days to B nostrils starting today and bathe with CHG soap for 7 days prior to surgery. Pt verbalized understanding of instructions and will start today as recommended. Allergies and pharmacy of choice reviewed. Rx escribed to pt's pharmacy of choice. PTA medlist updated. Surgeon and PCP notified of positive culture and treatment.      Shy Gonzales NP no

## 2025-06-13 RX ORDER — LOSARTAN POTASSIUM 25 MG/1
25 TABLET ORAL DAILY
Qty: 90 TABLET | Refills: 1 | Status: SHIPPED | OUTPATIENT
Start: 2025-06-13

## 2025-06-13 RX ORDER — ATORVASTATIN CALCIUM 40 MG/1
40 TABLET, FILM COATED ORAL DAILY
Qty: 90 TABLET | Refills: 3 | Status: SHIPPED | OUTPATIENT
Start: 2025-06-13

## 2025-06-13 RX ORDER — CHOLECALCIFEROL (VITAMIN D3) 25 MCG
1000 TABLET ORAL DAILY
Qty: 90 TABLET | Refills: 1 | Status: SHIPPED | OUTPATIENT
Start: 2025-06-13

## 2025-06-13 RX ORDER — FUROSEMIDE 20 MG/1
20 TABLET ORAL DAILY
Qty: 90 TABLET | Refills: 1 | Status: SHIPPED | OUTPATIENT
Start: 2025-06-13

## 2025-06-13 RX ORDER — AMLODIPINE BESYLATE 10 MG/1
10 TABLET ORAL DAILY
Qty: 90 TABLET | Refills: 1 | Status: SHIPPED | OUTPATIENT
Start: 2025-06-13

## 2025-06-13 NOTE — TELEPHONE ENCOUNTER
The patient states that his blood sugars is running in the 200's and 300's. He states that he is taking Levemir 30 units in the morning and 25 units at night. He states that he is having issues with his feet swelling and he states that he is not taking a fluid pill because he ran out of this medication.     The patient states that he needs refills on his medication.

## 2025-08-22 DIAGNOSIS — E11.42 TYPE 2 DIABETES MELLITUS WITH DIABETIC POLYNEUROPATHY, WITH LONG-TERM CURRENT USE OF INSULIN (HCC): ICD-10-CM

## 2025-08-22 DIAGNOSIS — Z79.4 TYPE 2 DIABETES MELLITUS WITH DIABETIC POLYNEUROPATHY, WITH LONG-TERM CURRENT USE OF INSULIN (HCC): ICD-10-CM

## 2025-08-22 RX ORDER — ACYCLOVIR 400 MG/1
TABLET ORAL
Qty: 3 EACH | Refills: 3 | Status: SHIPPED | OUTPATIENT
Start: 2025-08-22

## (undated) DEVICE — DECANTER BAG 9": Brand: MEDLINE INDUSTRIES, INC.

## (undated) DEVICE — CARTRIDGE BNE CEM MIX UNIV TWR VAC ROTOR BRK OFF NOZ W/O

## (undated) DEVICE — STERILE POLYISOPRENE POWDER-FREE SURGICAL GLOVES WITH EMOLLIENT COATING: Brand: PROTEXIS

## (undated) DEVICE — HANDPIECE SET WITH BONE CLEANING TIP AND SUCTION TUBE: Brand: INTERPULSE

## (undated) DEVICE — 4-PORT MANIFOLD: Brand: NEPTUNE 2

## (undated) DEVICE — ZIMMER® STERILE DISPOSABLE TOURNIQUET CUFF WITH PLC, DUAL PORT, SINGLE BLADDER, 34 IN. (86 CM)

## (undated) DEVICE — CONTAINER,SPECIMEN,3OZ,OR STRL: Brand: MEDLINE

## (undated) DEVICE — SUTURE VCRL SZ 2-0 L36IN ABSRB UD L40MM CT 1/2 CIR J957H

## (undated) DEVICE — SYR 20ML LL STRL LF --

## (undated) DEVICE — INFECTION CONTROL KIT SYS

## (undated) DEVICE — SUTURE VCRL SZ 2 L54IN ABSRB UD L65MM TP-1 1/2 CIR J880T

## (undated) DEVICE — SYR LR LCK 1ML GRAD NSAF 30ML --

## (undated) DEVICE — STERILE POLYISOPRENE POWDER-FREE SURGICAL GLOVES: Brand: PROTEXIS

## (undated) DEVICE — NEEDLE HYPO 21GA L1.5IN INTRAMUSCULAR S STL LATCH BVL UP

## (undated) DEVICE — PADDING CST 6IN STERILE --

## (undated) DEVICE — SCR BNE CORT HEX FEM 2.5X25MM

## (undated) DEVICE — SUTURE STRATAFIX SYMMETRIC PDS + SZ 1 L18IN ABSRB VLT L48MM SXPP1A400

## (undated) DEVICE — Device

## (undated) DEVICE — REM POLYHESIVE ADULT PATIENT RETURN ELECTRODE: Brand: VALLEYLAB

## (undated) DEVICE — STRAP,POSITIONING,KNEE/BODY,FOAM,4X60": Brand: MEDLINE

## (undated) DEVICE — BANDAGE COMPR M W6INXL10YD WHT BGE VELC E MTRX HK AND LOOP

## (undated) DEVICE — SOLUTION IRRIG 1000ML H2O STRL BLT

## (undated) DEVICE — T4 HOOD

## (undated) DEVICE — YANKAUER,FLEXIBLE HANDLE,REGLR CAPACITY: Brand: MEDLINE INDUSTRIES, INC.

## (undated) DEVICE — BLADE SAW W083XL354IN THK0047IN CUT THK0047IN SAG FLR

## (undated) DEVICE — PREP SKN CHLRAPRP APL 26ML STR --

## (undated) DEVICE — TOTAL TRAY, 16FR 10ML SIL FOLEY, URN: Brand: MEDLINE

## (undated) DEVICE — SCRUB DRY SURG EZ SCRUB BRUSH PREOPERATIVE GRN

## (undated) DEVICE — SOLUTION IRRIG 3000ML 0.9% SOD CHL FLX CONT 0797208] ICU MEDICAL INC]